# Patient Record
Sex: FEMALE | Race: WHITE | NOT HISPANIC OR LATINO | Employment: FULL TIME | URBAN - METROPOLITAN AREA
[De-identification: names, ages, dates, MRNs, and addresses within clinical notes are randomized per-mention and may not be internally consistent; named-entity substitution may affect disease eponyms.]

---

## 2018-10-04 ENCOUNTER — OFFICE VISIT (OUTPATIENT)
Dept: URGENT CARE | Facility: CLINIC | Age: 53
End: 2018-10-04
Payer: COMMERCIAL

## 2018-10-04 VITALS
HEART RATE: 71 BPM | TEMPERATURE: 98 F | DIASTOLIC BLOOD PRESSURE: 82 MMHG | HEIGHT: 69 IN | SYSTOLIC BLOOD PRESSURE: 129 MMHG | OXYGEN SATURATION: 99 % | BODY MASS INDEX: 29.77 KG/M2 | WEIGHT: 201 LBS | RESPIRATION RATE: 14 BRPM

## 2018-10-04 DIAGNOSIS — S93.402A SPRAIN OF LEFT ANKLE, UNSPECIFIED LIGAMENT, INITIAL ENCOUNTER: Primary | ICD-10-CM

## 2018-10-04 DIAGNOSIS — S82.432A CLOSED DISPLACED OBLIQUE FRACTURE OF SHAFT OF LEFT FIBULA, INITIAL ENCOUNTER: ICD-10-CM

## 2018-10-04 PROCEDURE — 99204 OFFICE O/P NEW MOD 45 MIN: CPT | Mod: 25,S$GLB,, | Performed by: NURSE PRACTITIONER

## 2018-10-04 PROCEDURE — 29515 APPLICATION SHORT LEG SPLINT: CPT | Mod: LT,S$GLB,, | Performed by: NURSE PRACTITIONER

## 2018-10-04 RX ORDER — ESTRADIOL 0.1 MG/G
CREAM VAGINAL DAILY
COMMUNITY
End: 2019-07-25

## 2018-10-04 RX ORDER — THYROID 30 MG/1
30 TABLET ORAL
COMMUNITY
End: 2018-11-06

## 2018-10-04 RX ORDER — HYDROCODONE BITARTRATE AND ACETAMINOPHEN 5; 325 MG/1; MG/1
1 TABLET ORAL EVERY 6 HOURS PRN
Qty: 20 TABLET | Refills: 0 | Status: SHIPPED | OUTPATIENT
Start: 2018-10-04 | End: 2018-12-04

## 2018-10-04 NOTE — PROCEDURES
Splint application  Date/Time: 10/4/2018 1:51 PM  Performed by: Agustin Jensen NP  Authorized by: Agustin Jensen NP   Location details: left ankle  Splint type: short leg  Supplies used: cotton padding and Ortho-Glass  Post-procedure: The splinted body part was neurovascularly unchanged following the procedure.  Patient tolerance: Patient tolerated the procedure well with no immediate complications

## 2018-10-04 NOTE — PROGRESS NOTES
"Subjective:       Patient ID: Mariah Ashton is a 53 y.o. female.    Vitals:  height is 5' 9" (1.753 m) and weight is 91.2 kg (201 lb). Her temperature is 97.6 °F (36.4 °C). Her blood pressure is 129/82 and her pulse is 71. Her respiration is 14 and oxygen saturation is 99%.     Chief Complaint: Fall  53 year old female presents complaining of fall. She complains of left ankle pain/swelling and abrasion to right knee. She fell and sustained a inversion injury to the left ankle when she missed step on stairs.  Review of Systems   Constitution: Negative for weakness and malaise/fatigue.   HENT: Negative for nosebleeds.    Cardiovascular: Negative for chest pain and syncope.   Respiratory: Negative for shortness of breath.    Skin:        abrasion   Musculoskeletal: Positive for joint swelling. Negative for back pain, joint pain and neck pain.   Gastrointestinal: Negative for abdominal pain.   Genitourinary: Negative for hematuria.   Neurological: Negative for dizziness and numbness.       Objective:      Physical Exam   Constitutional: She is oriented to person, place, and time. She appears well-developed and well-nourished. She is cooperative.  Non-toxic appearance. She does not appear ill. No distress.   HENT:   Head: Normocephalic and atraumatic. Head is without abrasion, without contusion and without laceration.   Right Ear: Hearing, tympanic membrane, external ear and ear canal normal. No hemotympanum.   Left Ear: Hearing, tympanic membrane, external ear and ear canal normal. No hemotympanum.   Nose: Nose normal. No mucosal edema, rhinorrhea or nasal deformity. No epistaxis. Right sinus exhibits no maxillary sinus tenderness and no frontal sinus tenderness. Left sinus exhibits no maxillary sinus tenderness and no frontal sinus tenderness.   Mouth/Throat: Uvula is midline, oropharynx is clear and moist and mucous membranes are normal. No trismus in the jaw. Normal dentition. No uvula swelling. No posterior " oropharyngeal erythema.   Eyes: Conjunctivae, EOM and lids are normal. Pupils are equal, round, and reactive to light. Right eye exhibits no discharge. Left eye exhibits no discharge. No scleral icterus.   Sclera clear bilat   Neck: Trachea normal, normal range of motion, full passive range of motion without pain and phonation normal. Neck supple. No spinous process tenderness and no muscular tenderness present. No neck rigidity. No tracheal deviation present.   Cardiovascular: Normal rate, regular rhythm, normal heart sounds, intact distal pulses and normal pulses.   Pulmonary/Chest: Effort normal and breath sounds normal. No respiratory distress.   Abdominal: Soft. Normal appearance and bowel sounds are normal. She exhibits no distension, no pulsatile midline mass and no mass. There is no tenderness.   Musculoskeletal: She exhibits no edema or deformity.        Right knee: She exhibits normal range of motion, no swelling, no deformity and no LCL laxity.        Left ankle: She exhibits decreased range of motion and swelling. She exhibits no laceration and normal pulse. Tenderness. Achilles tendon normal.        Legs:  Superficial abrasion   Neurological: She is alert and oriented to person, place, and time. She has normal strength. No cranial nerve deficit or sensory deficit. She exhibits normal muscle tone. She displays no seizure activity. Coordination normal. GCS eye subscore is 4. GCS verbal subscore is 5. GCS motor subscore is 6.   Skin: Skin is warm, dry and intact. Capillary refill takes less than 2 seconds. No abrasion, no bruising, no burn, no ecchymosis and no laceration noted. She is not diaphoretic. No pallor.   Psychiatric: She has a normal mood and affect. Her speech is normal and behavior is normal. Judgment and thought content normal. Cognition and memory are normal.   Nursing note and vitals reviewed.      Assessment:       1. Sprain of left ankle, unspecified ligament, initial encounter    2.  Closed displaced oblique fracture of shaft of left fibula, initial encounter        Plan:     Xray of left ankle suspicious for nondisplaced distal fibular fracture. Neurovascularly intact. Posterior short leg splint, crutches, norco prn. Orthopedic follow up.    Sprain of left ankle, unspecified ligament, initial encounter  -     X-Ray Ankle Complete 3 View Left; Future; Expected date: 10/04/2018  -     Splint application    Closed displaced oblique fracture of shaft of left fibula, initial encounter  -     Splint application

## 2018-10-04 NOTE — PATIENT INSTRUCTIONS
Understanding an Ankle Fracture    The ankle is formed by bones in the lower leg (tibia and fibula) and the bone on top of the foot (talus). When you have a fracture of the ankle, it means that one or more of the bones in the ankle are broken. The bone may be cracked, broken into two or more pieces, or even shattered. The pieces of bone may be lined up or they may have moved out of place. Sometimes, the bone may break through the skin. Nearby ligaments may also be damaged. Depending on how badly the bone is broken, healing may take a few months or longer.   What causes an ankle fracture?  Ankle fractures are often caused from severely twisting or rolling the ankle. They may also be caused from a fall, blow, accident, or sports injury.  Symptoms of an ankle fracture  Symptoms can include pain, swelling, and bruising. If the bone breaks through the skin, bleeding at the site can also occur. The ankle may look crooked, deformed, or bent. Also, it may be hard to move or use the ankle and foot as you would normally.  Treating an ankle fracture  Treatment for an ankle fracture depends on where the bone is broken and how serious the break is. If needed, the bone is put back into place. This may be done with or without surgery. If surgery is needed, the surgeon may use devices such as pins, plates, or screws to hold the bone together. Usually, you will wear a splint, brace, or short leg cast to keep the bone in place and protect it from injury during healing. Other treatments may also be used to help reduce symptoms or regain function. These include:  · Rest. You may need to avoid walking or putting any weight on the broken ankle for a period of time. Severe fractures need a longer limit on weight-bearing activities.  · Cold packs. Putting an ice pack over the injured area may help reduce swelling and pain.  · Compression. An elastic bandage may be wrapped around the ankle to help reduce swelling.  · Elevation. Propping  up the ankle so that it is above your heart may ease swelling.  · Pain medicines. Prescription or over-the-counter pain medicines may help reduce pain and swelling. If needed, stronger pain medicines may be prescribed.  · Exercises. Your healthcare provider may give you certain exercises to do at home or with a physical therapist. These help restore strength, flexibility, and range of motion in the ankle and foot.  Possible complications of an ankle fracture  These can include:  · Poor healing of the bone  · Weakness, stiffness, or loss of range of motion in the ankle  · Osteoarthritis in the ankle  When to call your healthcare provider  Call your healthcare provider right away if you have any of these:  · Fever of 100.4°F (38°C) or higher, or as directed  · Symptoms that dont get better with treatment, or get worse  · Numbness, tingling, or coldness in your foot or toes  · Toenails that turn blue or grey in color  · A splint, brace, or cast that is damaged or feels too tight or loose  · Unusual redness, warmth, swelling, bleeding, or drainage from any wounds or incision sites  · New symptoms   Date Last Reviewed: 3/10/2016  © 9179-0917 [email or web address]. [address] [place] [address], [place] [address]. All rights reserved. This information is not intended as a substitute for professional medical care. Always follow your healthcare professional's instructions.

## 2018-10-09 ENCOUNTER — OFFICE VISIT (OUTPATIENT)
Dept: ORTHOPEDICS | Facility: CLINIC | Age: 53
End: 2018-10-09
Payer: COMMERCIAL

## 2018-10-09 VITALS
BODY MASS INDEX: 28.14 KG/M2 | DIASTOLIC BLOOD PRESSURE: 80 MMHG | WEIGHT: 190 LBS | HEIGHT: 69 IN | SYSTOLIC BLOOD PRESSURE: 118 MMHG

## 2018-10-09 DIAGNOSIS — S82.65XA CLOSED NONDISPLACED FRACTURE OF LATERAL MALLEOLUS OF LEFT FIBULA, INITIAL ENCOUNTER: Primary | ICD-10-CM

## 2018-10-09 PROCEDURE — 73610 X-RAY EXAM OF ANKLE: CPT | Mod: LT,,, | Performed by: ORTHOPAEDIC SURGERY

## 2018-10-09 PROCEDURE — 99203 OFFICE O/P NEW LOW 30 MIN: CPT | Mod: 25,,, | Performed by: ORTHOPAEDIC SURGERY

## 2018-10-09 RX ORDER — ESTRADIOL 2 MG/1
2 TABLET ORAL NIGHTLY
Refills: 1 | COMMUNITY
Start: 2018-07-04 | End: 2019-11-04 | Stop reason: SDUPTHER

## 2018-10-09 RX ORDER — METOPROLOL SUCCINATE 25 MG/1
25 TABLET, EXTENDED RELEASE ORAL NIGHTLY
Refills: 3 | Status: ON HOLD | COMMUNITY
Start: 2018-09-07 | End: 2019-08-31 | Stop reason: SDUPTHER

## 2018-10-09 RX ORDER — THYROID, PORCINE 90 MG/1
TABLET ORAL
Refills: 5 | COMMUNITY
Start: 2018-09-04 | End: 2019-10-21

## 2018-10-09 NOTE — LETTER
October 9, 2018      Joshua Pruitt MD  [address]  [facility] [address]           [facility]  [address] [facility] [address]  [facility] [address]  Phone: [phone]  Fax: [phone]          Patient: Mariah Ashton   MR Number: [id number]   YOB: 1965   Date of Visit: 10/9/2018       Dear Dr. Joshua Pruitt:    Thank you for referring Mariah Ashton to me for evaluation. Attached you will find relevant portions of my assessment and plan of care.    If you have questions, please do not hesitate to call me. I look forward to following Mariah Ashton along with you.    Sincerely,    Porter Cfiuentes MD    Enclosure  CC:  No Recipients    If you would like to receive this communication electronically, please contact [email or web address] or [phone] to request more information on [facility] access.    For providers and/or their staff who would like to refer a patient to [facility], please contact us through our one-stop-shop provider referral line, [facility] [place], at [phone].    If you feel you have received this communication in error or would no longer like to receive these types of communications, please e-mail [email or web address]

## 2018-10-09 NOTE — PROGRESS NOTES
[facility]    Subjective:     Chief Complaint:   Chief Complaint   Patient presents with    Left Lower Leg - Injury     WC left  /fib fracture Thursday she was going down the steps at work and fell. She went to [facility] and that is where she had the x ray.       Past Medical History:   Diagnosis Date    Thyroid disease        Past Surgical History:   Procedure Laterality Date    ATRIAL CARDIAC PACEMAKER INSERTION      HYSTERECTOMY      left shoulder         Current Outpatient Medications   Medication Sig    ARMOUR THYROID 90 mg Tab TAKE 1 TABLET BY MOUTH ONCE ON MONDAY, WEDNESDAY, AND FRIDAY    estradiol (ESTRACE) 0.01 % (0.1 mg/gram) vaginal cream Place vaginally once daily.    estradiol (ESTRACE) 2 MG tablet Take 2 mg by mouth once daily.    metoprolol succinate (TOPROL-XL) 25 MG 24 hr tablet Take 25 mg by mouth once daily.    thyroid, pork, (ARMOUR THYROID) 30 mg Tab Take 30 mg by mouth before breakfast.    HYDROcodone-acetaminophen (NORCO) 5-325 mg per tablet Take 1 tablet by mouth every 6 (six) hours as needed for Pain.     No current facility-administered medications for this visit.        Review of patient's allergies indicates:  No Known Allergies    Family History   Problem Relation Age of Onset    No Known Problems Mother     No Known Problems Father        Social History     Socioeconomic History    Marital status: Single     Spouse name: Not on file    Number of children: Not on file    Years of education: Not on file    Highest education level: Not on file   Social Needs    Financial resource strain: Not on file    Food insecurity - worry: Not on file    Food insecurity - inability: Not on file    Transportation needs - medical: Not on file    Transportation needs - non-medical: Not on file   Occupational History    Not on file   Tobacco Use    Smoking status: Never Smoker    Smokeless tobacco: Never Used   Substance and Sexual Activity    Alcohol use: Not on file     Drug use: Not on file    Sexual activity: Not on file   Other Topics Concern    Not on file   Social History Narrative    Not on file       History of present illness: Patient comes in today for the left lower extremity. She fell at work. She was coming down stairs and tripped. At that time she sustained an injury to her ankle and referred on for further care.      Review of Systems:    Constitution: Negative for chills, fever, and sweats.  Negative for unexplained weight loss.    HENT:  Negative for headaches and blurry vision.    Cardiovascular:Negative for chest pain or irregular heart beat. Negative for hypertension.    Respiratory:  Negative for cough and shortness of breath.    Gastrointestinal: Negative for abdominal pain, heartburn, melena, nausea, and vomitting.    Genitourinary:  Negative bladder incontinence and dysuria.    Musculoskeletal:  See HPI for details.     Neurological: Negative for numbness.    Psychiatric/Behavioral: Negative for depression.  The patient is not nervous/anxious.      Endocrine: Negative for polyuria    Hematologic/Lymphatic: Negative for bleeding problem.  Does not bruise/bleed easily.    Skin: Negative for poor would healing and rash    Objective:      Physical Examination:    Vital Signs:    Vitals:    10/09/18 1308   BP: 118/80       Body mass index is 28.06 kg/m².    This a well-developed, well nourished patient in no acute distress.  They are alert and oriented and cooperative to examination.        Patient is tender over the lateral aspect of the ankle directly over the fibula. She is ecchymotic on both medial and lateral aspects of the ankle. She has no tenderness over the Lisfranc joint. No proximal fibula tenderness. No defect palpated the Achilles tendon.  Pertinent New Results:    XRAY Report / Interpretation:   AP lateral and oblique of the ankle done today demonstrates a nondisplaced lateral malleolar fracture    Assessment/Plan:      Lateral malleolar  fracture. Placed her into walker boot. She may be weightbearing as tolerated in the boot. No work for 2 weeks. She should stay home and elevate primarily. She must be allowed to elevate her leg. She will follow-up in 2 weeks. Anticipate MMI in 90 days. She will more likely than not return to her regular job in 4 weeks      This note was created using Dragon voice recognition software that occasionally misinterpreted phrases or words.

## 2018-10-23 ENCOUNTER — OFFICE VISIT (OUTPATIENT)
Dept: ORTHOPEDICS | Facility: CLINIC | Age: 53
End: 2018-10-23
Payer: COMMERCIAL

## 2018-10-23 VITALS
SYSTOLIC BLOOD PRESSURE: 130 MMHG | DIASTOLIC BLOOD PRESSURE: 80 MMHG | HEIGHT: 69 IN | BODY MASS INDEX: 28.88 KG/M2 | WEIGHT: 195 LBS

## 2018-10-23 DIAGNOSIS — S82.65XD CLOSED NONDISPLACED FRACTURE OF LATERAL MALLEOLUS OF LEFT FIBULA WITH ROUTINE HEALING, SUBSEQUENT ENCOUNTER: Primary | ICD-10-CM

## 2018-10-23 PROCEDURE — 73610 X-RAY EXAM OF ANKLE: CPT | Mod: LT,,, | Performed by: ORTHOPAEDIC SURGERY

## 2018-10-23 PROCEDURE — 99213 OFFICE O/P EST LOW 20 MIN: CPT | Mod: 25,,, | Performed by: ORTHOPAEDIC SURGERY

## 2018-10-23 NOTE — PROGRESS NOTES
[facility]    Subjective:     Chief Complaint:   Chief Complaint   Patient presents with    Left Ankle - Injury     WC left ankle fx. She has minor pain        Past Medical History:   Diagnosis Date    Thyroid disease        Past Surgical History:   Procedure Laterality Date    ATRIAL CARDIAC PACEMAKER INSERTION      HYSTERECTOMY      left shoulder         Current Outpatient Medications   Medication Sig    ARMOUR THYROID 90 mg Tab TAKE 1 TABLET BY MOUTH ONCE ON MONDAY, WEDNESDAY, AND FRIDAY    estradiol (ESTRACE) 0.01 % (0.1 mg/gram) vaginal cream Place vaginally once daily.    estradiol (ESTRACE) 2 MG tablet Take 2 mg by mouth once daily.    HYDROcodone-acetaminophen (NORCO) 5-325 mg per tablet Take 1 tablet by mouth every 6 (six) hours as needed for Pain.    metoprolol succinate (TOPROL-XL) 25 MG 24 hr tablet Take 25 mg by mouth once daily.    thyroid, pork, (ARMOUR THYROID) 30 mg Tab Take 30 mg by mouth before breakfast.     No current facility-administered medications for this visit.        Review of patient's allergies indicates:  No Known Allergies    Family History   Problem Relation Age of Onset    No Known Problems Mother     No Known Problems Father        Social History     Socioeconomic History    Marital status:      Spouse name: Not on file    Number of children: Not on file    Years of education: Not on file    Highest education level: Not on file   Social Needs    Financial resource strain: Not on file    Food insecurity - worry: Not on file    Food insecurity - inability: Not on file    Transportation needs - medical: Not on file    Transportation needs - non-medical: Not on file   Occupational History    Not on file   Tobacco Use    Smoking status: Never Smoker    Smokeless tobacco: Never Used   Substance and Sexual Activity    Alcohol use: Not on file    Drug use: Not on file    Sexual activity: Not on file   Other Topics Concern    Not on file    Social History Narrative    Not on file       History of present illness: Patient returns for her left ankle. She is here for follow-up for lateral malleolar fracture.      Review of Systems:    Constitution: Negative for chills, fever, and sweats.  Negative for unexplained weight loss.    HENT:  Negative for headaches and blurry vision.    Cardiovascular:Negative for chest pain or irregular heart beat. Negative for hypertension.    Respiratory:  Negative for cough and shortness of breath.    Gastrointestinal: Negative for abdominal pain, heartburn, melena, nausea, and vomitting.    Genitourinary:  Negative bladder incontinence and dysuria.    Musculoskeletal:  See HPI for details.     Neurological: Negative for numbness.    Psychiatric/Behavioral: Negative for depression.  The patient is not nervous/anxious.      Endocrine: Negative for polyuria    Hematologic/Lymphatic: Negative for bleeding problem.  Does not bruise/bleed easily.    Skin: Negative for poor would healing and rash    Objective:      Physical Examination:    Vital Signs:    Vitals:    10/23/18 1335   BP: 130/80       Body mass index is 28.8 kg/m².    This a well-developed, well nourished patient in no acute distress.  They are alert and oriented and cooperative to examination.        Patient is tender over the lateral malleolus. No tenderness medially.  Pertinent New Results:    XRAY Report / Interpretation:   AP lateral oblique of the left ankle demonstrate lateral malleolar fracture. There is been no change in the fracture fragments from prior visit. The mortise is intact.    Assessment/Plan:      3 weeks out from a month minimally displaced lateral malleolar fracture. Generally doing very well. She will continue her cast boot. She will follow-up in 2 weeks for new x-rays. In the interim no work.    This note was created using Dragon voice recognition software that occasionally misinterpreted phrases or words.

## 2018-11-06 ENCOUNTER — OFFICE VISIT (OUTPATIENT)
Dept: ORTHOPEDICS | Facility: CLINIC | Age: 53
End: 2018-11-06
Payer: COMMERCIAL

## 2018-11-06 VITALS
BODY MASS INDEX: 28.88 KG/M2 | WEIGHT: 195 LBS | DIASTOLIC BLOOD PRESSURE: 96 MMHG | HEART RATE: 116 BPM | SYSTOLIC BLOOD PRESSURE: 139 MMHG | HEIGHT: 69 IN

## 2018-11-06 DIAGNOSIS — S82.65XD CLOSED NONDISPLACED FRACTURE OF LATERAL MALLEOLUS OF LEFT FIBULA WITH ROUTINE HEALING, SUBSEQUENT ENCOUNTER: Primary | ICD-10-CM

## 2018-11-06 PROCEDURE — 99024 POSTOP FOLLOW-UP VISIT: CPT | Mod: ,,, | Performed by: ORTHOPAEDIC SURGERY

## 2018-11-06 PROCEDURE — 73610 X-RAY EXAM OF ANKLE: CPT | Mod: LT,,, | Performed by: ORTHOPAEDIC SURGERY

## 2018-11-06 NOTE — PROGRESS NOTES
[facility]    Subjective:     Chief Complaint:   Chief Complaint   Patient presents with    Left Ankle - Pain     W/C DOI 10/4/18. Left ankle fx. States that she is feeling better.          Past Medical History:   Diagnosis Date    Thyroid disease        Past Surgical History:   Procedure Laterality Date    ATRIAL CARDIAC PACEMAKER INSERTION      HYSTERECTOMY      left shoulder         Current Outpatient Medications   Medication Sig    ARMOUR THYROID 90 mg Tab TAKE 1 TABLET BY MOUTH ONCE ON MONDAY, WEDNESDAY, AND FRIDAY    estradiol (ESTRACE) 0.01 % (0.1 mg/gram) vaginal cream Place vaginally once daily.    estradiol (ESTRACE) 2 MG tablet Take 2 mg by mouth once daily.    HYDROcodone-acetaminophen (NORCO) 5-325 mg per tablet Take 1 tablet by mouth every 6 (six) hours as needed for Pain.    metoprolol succinate (TOPROL-XL) 25 MG 24 hr tablet Take 25 mg by mouth once daily.     No current facility-administered medications for this visit.        Review of patient's allergies indicates:  No Known Allergies    Family History   Problem Relation Age of Onset    No Known Problems Mother     No Known Problems Father        Social History     Socioeconomic History    Marital status:      Spouse name: Not on file    Number of children: Not on file    Years of education: Not on file    Highest education level: Not on file   Social Needs    Financial resource strain: Not on file    Food insecurity - worry: Not on file    Food insecurity - inability: Not on file    Transportation needs - medical: Not on file    Transportation needs - non-medical: Not on file   Occupational History    Not on file   Tobacco Use    Smoking status: Never Smoker    Smokeless tobacco: Never Used   Substance and Sexual Activity    Alcohol use: Not on file    Drug use: Not on file    Sexual activity: Not on file   Other Topics Concern    Not on file   Social History Narrative    Not on file       History of  present illness: Patient returns for the left ankle. She is doing much better her pain is significantly improved      Review of Systems:    Constitution: Negative for chills, fever, and sweats.  Negative for unexplained weight loss.    HENT:  Negative for headaches and blurry vision.    Cardiovascular:Negative for chest pain or irregular heart beat. Negative for hypertension.    Respiratory:  Negative for cough and shortness of breath.    Gastrointestinal: Negative for abdominal pain, heartburn, melena, nausea, and vomitting.    Genitourinary:  Negative bladder incontinence and dysuria.    Musculoskeletal:  See HPI for details.     Neurological: Negative for numbness.    Psychiatric/Behavioral: Negative for depression.  The patient is not nervous/anxious.      Endocrine: Negative for polyuria    Hematologic/Lymphatic: Negative for bleeding problem.  Does not bruise/bleed easily.    Skin: Negative for poor would healing and rash    Objective:      Physical Examination:    Vital Signs:    Vitals:    11/06/18 1137   BP: (!) 139/96   Pulse: (!) 116       Body mass index is 28.8 kg/m².    This a well-developed, well nourished patient in no acute distress.  They are alert and oriented and cooperative to examination.        Patient is minimal discomfort over the lateral malleolus.  Pertinent New Results:    XRAY Report / Interpretation:   AP lateral and oblique of the left ankle done in the office today demonstrates a lateral malleolar fracture in acceptable position. This been no change from interval x-rays.    Assessment/Plan:      Lateral malleolar fracture. Placed her into an ankle corset. She may return to work primarily sedentary duty. Started on physical therapy. Follow-up in 6 weeks. Anticipate she'll be MMI in 6 weeks      This note was created using Dragon voice recognition software that occasionally misinterpreted phrases or words.

## 2018-12-04 ENCOUNTER — OFFICE VISIT (OUTPATIENT)
Dept: ORTHOPEDICS | Facility: CLINIC | Age: 53
End: 2018-12-04
Payer: COMMERCIAL

## 2018-12-04 VITALS
HEART RATE: 63 BPM | BODY MASS INDEX: 28.88 KG/M2 | DIASTOLIC BLOOD PRESSURE: 80 MMHG | HEIGHT: 69 IN | WEIGHT: 195 LBS | SYSTOLIC BLOOD PRESSURE: 128 MMHG

## 2018-12-04 DIAGNOSIS — S82.65XD CLOSED NONDISPLACED FRACTURE OF LATERAL MALLEOLUS OF LEFT FIBULA WITH ROUTINE HEALING, SUBSEQUENT ENCOUNTER: Primary | ICD-10-CM

## 2018-12-04 PROCEDURE — 99213 OFFICE O/P EST LOW 20 MIN: CPT | Mod: 25,,, | Performed by: ORTHOPAEDIC SURGERY

## 2018-12-04 PROCEDURE — 73610 X-RAY EXAM OF ANKLE: CPT | Mod: LT,,, | Performed by: ORTHOPAEDIC SURGERY

## 2018-12-04 NOTE — PROGRESS NOTES
[facility]    Subjective:     Chief Complaint:   Chief Complaint   Patient presents with    Left Ankle - Injury     WC 4 lt ankle fx follow up DOI 10/4/18. She still has some soreness. She took corsett off and she has not had P.T       Past Medical History:   Diagnosis Date    Thyroid disease        Past Surgical History:   Procedure Laterality Date    ATRIAL CARDIAC PACEMAKER INSERTION      HYSTERECTOMY      left shoulder         Current Outpatient Medications   Medication Sig    ARMOUR THYROID 90 mg Tab TAKE 1 TABLET BY MOUTH ONCE ON MONDAY, WEDNESDAY, AND FRIDAY    estradiol (ESTRACE) 0.01 % (0.1 mg/gram) vaginal cream Place vaginally once daily.    estradiol (ESTRACE) 2 MG tablet Take 2 mg by mouth once daily.    metoprolol succinate (TOPROL-XL) 25 MG 24 hr tablet Take 25 mg by mouth once daily.     No current facility-administered medications for this visit.        Review of patient's allergies indicates:  No Known Allergies    Family History   Problem Relation Age of Onset    No Known Problems Mother     No Known Problems Father        Social History     Socioeconomic History    Marital status:      Spouse name: Not on file    Number of children: Not on file    Years of education: Not on file    Highest education level: Not on file   Social Needs    Financial resource strain: Not on file    Food insecurity - worry: Not on file    Food insecurity - inability: Not on file    Transportation needs - medical: Not on file    Transportation needs - non-medical: Not on file   Occupational History    Not on file   Tobacco Use    Smoking status: Never Smoker    Smokeless tobacco: Never Used   Substance and Sexual Activity    Alcohol use: Not on file    Drug use: Not on file    Sexual activity: Not on file   Other Topics Concern    Not on file   Social History Narrative    Not on file       History of present illness: Patient returns status post lateral malleolar fracture of  her left ankle. She continues to improve. Still complains of pain and swelling. She is 8 weeks out.      Review of Systems:    Constitution: Negative for chills, fever, and sweats.  Negative for unexplained weight loss.    HENT:  Negative for headaches and blurry vision.    Cardiovascular:Negative for chest pain or irregular heart beat. Negative for hypertension.    Respiratory:  Negative for cough and shortness of breath.    Gastrointestinal: Negative for abdominal pain, heartburn, melena, nausea, and vomitting.    Genitourinary:  Negative bladder incontinence and dysuria.    Musculoskeletal:  See HPI for details.     Neurological: Negative for numbness.    Psychiatric/Behavioral: Negative for depression.  The patient is not nervous/anxious.      Endocrine: Negative for polyuria    Hematologic/Lymphatic: Negative for bleeding problem.  Does not bruise/bleed easily.    Skin: Negative for poor would healing and rash    Objective:      Physical Examination:    Vital Signs:    Vitals:    12/04/18 1531   BP: 128/80   Pulse: 63       Body mass index is 28.8 kg/m².    This a well-developed, well nourished patient in no acute distress.  They are alert and oriented and cooperative to examination.        Patient is tender over the lateral talus. She has 20° of dorsiflexion. She is 20° of plantar flexion. She is neurovascular intact the foot.  Pertinent New Results:    XRAY Report / Interpretation:   AP and lateral of the left ankle in the office today demonstrates an intact mortise. The fracture is in good position. She is developing callus. There is been no change in fracture fragment since a prior x-ray    Assessment/Plan:      Stable following closed treatment of lateral malleolar fracture. Continue primarily sedentary work with intermittent standing. Start physical therapy. Follow-up in 6 weeks. At that time I hope to release her back to full duty      This note was created using Dragon voice recognition software that  occasionally misinterpreted phrases or words.

## 2019-01-15 ENCOUNTER — OFFICE VISIT (OUTPATIENT)
Dept: ORTHOPEDICS | Facility: CLINIC | Age: 54
End: 2019-01-15
Payer: COMMERCIAL

## 2019-01-15 VITALS
HEART RATE: 66 BPM | BODY MASS INDEX: 28.88 KG/M2 | WEIGHT: 195 LBS | DIASTOLIC BLOOD PRESSURE: 78 MMHG | HEIGHT: 69 IN | SYSTOLIC BLOOD PRESSURE: 128 MMHG

## 2019-01-15 DIAGNOSIS — S82.65XD CLOSED NONDISPLACED FRACTURE OF LATERAL MALLEOLUS OF LEFT FIBULA WITH ROUTINE HEALING, SUBSEQUENT ENCOUNTER: Primary | ICD-10-CM

## 2019-01-15 PROCEDURE — 99213 PR OFFICE/OUTPT VISIT, EST, LEVL III, 20-29 MIN: ICD-10-PCS | Mod: 25,,, | Performed by: ORTHOPAEDIC SURGERY

## 2019-01-15 PROCEDURE — 73610 X-RAY EXAM OF ANKLE: CPT | Mod: LT,,, | Performed by: ORTHOPAEDIC SURGERY

## 2019-01-15 PROCEDURE — 73610 PR  X-RAY ANKLE 3+ VW: ICD-10-PCS | Mod: LT,,, | Performed by: ORTHOPAEDIC SURGERY

## 2019-01-15 PROCEDURE — 99213 OFFICE O/P EST LOW 20 MIN: CPT | Mod: 25,,, | Performed by: ORTHOPAEDIC SURGERY

## 2019-01-15 RX ORDER — MELOXICAM 15 MG/1
15 TABLET ORAL DAILY
Qty: 30 TABLET | Refills: 2 | Status: SHIPPED | OUTPATIENT
Start: 2019-01-15 | End: 2019-08-29

## 2019-01-15 NOTE — PROGRESS NOTES
[facility]    Subjective:     Chief Complaint:   Chief Complaint   Patient presents with    Left Ankle - Injury     WC 6 wkLT ankle fx follow up. DOI 10-4-18. It is getting a little better and she is in P.T now         Past Medical History:   Diagnosis Date    Thyroid disease        Past Surgical History:   Procedure Laterality Date    ATRIAL CARDIAC PACEMAKER INSERTION      HYSTERECTOMY      left shoulder         Current Outpatient Medications   Medication Sig    ARMOUR THYROID 90 mg Tab TAKE 1 TABLET BY MOUTH ONCE ON MONDAY, WEDNESDAY, AND FRIDAY    estradiol (ESTRACE) 0.01 % (0.1 mg/gram) vaginal cream Place vaginally once daily.    estradiol (ESTRACE) 2 MG tablet Take 2 mg by mouth once daily.    metoprolol succinate (TOPROL-XL) 25 MG 24 hr tablet Take 25 mg by mouth once daily.     No current facility-administered medications for this visit.        Review of patient's allergies indicates:  No Known Allergies    Family History   Problem Relation Age of Onset    No Known Problems Mother     No Known Problems Father        Social History     Socioeconomic History    Marital status:      Spouse name: Not on file    Number of children: Not on file    Years of education: Not on file    Highest education level: Not on file   Social Needs    Financial resource strain: Not on file    Food insecurity - worry: Not on file    Food insecurity - inability: Not on file    Transportation needs - medical: Not on file    Transportation needs - non-medical: Not on file   Occupational History    Not on file   Tobacco Use    Smoking status: Never Smoker    Smokeless tobacco: Never Used   Substance and Sexual Activity    Alcohol use: Not on file    Drug use: Not on file    Sexual activity: Not on file   Other Topics Concern    Not on file   Social History Narrative    Not on file       History of present illness: Patient comes in today for her left ankle. She really is just started  physical therapy. She feels like it's helping already. She's having pain over the lateral aspect of the ankle.      Review of Systems:    Constitution: Negative for chills, fever, and sweats.  Negative for unexplained weight loss.    HENT:  Negative for headaches and blurry vision.    Cardiovascular:Negative for chest pain or irregular heart beat. Negative for hypertension.    Respiratory:  Negative for cough and shortness of breath.    Gastrointestinal: Negative for abdominal pain, heartburn, melena, nausea, and vomitting.    Genitourinary:  Negative bladder incontinence and dysuria.    Musculoskeletal:  See HPI for details.     Neurological: Negative for numbness.    Psychiatric/Behavioral: Negative for depression.  The patient is not nervous/anxious.      Endocrine: Negative for polyuria    Hematologic/Lymphatic: Negative for bleeding problem.  Does not bruise/bleed easily.    Skin: Negative for poor would healing and rash    Objective:      Physical Examination:    Vital Signs:    Vitals:    01/15/19 1627   BP: 128/78   Pulse: 66       Body mass index is 28.8 kg/m².    This a well-developed, well nourished patient in no acute distress.  They are alert and oriented and cooperative to examination.        Patient is tender over the peroneal tendons. She has no instability. She does have swelling around the ankle.  Pertinent New Results:    XRAY Report / Interpretation:   3 views of the ankle done in the office demonstrate a healed lateral malleolar fracture. The mortise is intact    Assessment/Plan:      Stable following conservative care for a lateral malleolar fracture. Continue physical therapy. Follow-up in 4 weeks. She may work but sedentary work      This note was created using Dragon voice recognition software that occasionally misinterpreted phrases or words.

## 2019-02-12 ENCOUNTER — OFFICE VISIT (OUTPATIENT)
Dept: ORTHOPEDICS | Facility: CLINIC | Age: 54
End: 2019-02-12
Payer: COMMERCIAL

## 2019-02-12 VITALS
SYSTOLIC BLOOD PRESSURE: 138 MMHG | DIASTOLIC BLOOD PRESSURE: 84 MMHG | HEIGHT: 69 IN | HEART RATE: 82 BPM | WEIGHT: 195 LBS | BODY MASS INDEX: 28.88 KG/M2

## 2019-02-12 DIAGNOSIS — S82.65XD CLOSED NONDISPLACED FRACTURE OF LATERAL MALLEOLUS OF LEFT FIBULA WITH ROUTINE HEALING, SUBSEQUENT ENCOUNTER: Primary | ICD-10-CM

## 2019-02-12 PROCEDURE — 99213 PR OFFICE/OUTPT VISIT, EST, LEVL III, 20-29 MIN: ICD-10-PCS | Mod: ,,, | Performed by: ORTHOPAEDIC SURGERY

## 2019-02-12 PROCEDURE — 99213 OFFICE O/P EST LOW 20 MIN: CPT | Mod: ,,, | Performed by: ORTHOPAEDIC SURGERY

## 2019-02-12 RX ORDER — MECLIZINE HYDROCHLORIDE 25 MG/1
25 TABLET ORAL 3 TIMES DAILY PRN
Refills: 0 | COMMUNITY
Start: 2019-02-05 | End: 2019-08-30

## 2019-02-12 NOTE — PROGRESS NOTES
[facility]    Subjective:     Chief Complaint:   Chief Complaint   Patient presents with    Left Ankle - Injury     W/C DOI 10.4.18 4 WK LT ANKLE PAIN FOLLOW UP (1/15/19) No pain. NO problems.       Past Medical History:   Diagnosis Date    Bradycardia     Thyroid disease        Past Surgical History:   Procedure Laterality Date    ATRIAL CARDIAC PACEMAKER INSERTION      HYSTERECTOMY      left shoulder         Current Outpatient Medications   Medication Sig    ARMOUR THYROID 90 mg Tab TAKE 1 TABLET BY MOUTH ONCE ON MONDAY, WEDNESDAY, AND FRIDAY    estradiol (ESTRACE) 0.01 % (0.1 mg/gram) vaginal cream Place vaginally once daily.    estradiol (ESTRACE) 2 MG tablet Take 2 mg by mouth once daily.    meclizine (ANTIVERT) 25 mg tablet Take 25 mg by mouth 3 (three) times daily as needed.    meloxicam (MOBIC) 15 MG tablet Take 1 tablet (15 mg total) by mouth once daily.    metoprolol succinate (TOPROL-XL) 25 MG 24 hr tablet Take 25 mg by mouth once daily.     No current facility-administered medications for this visit.        Review of patient's allergies indicates:  No Known Allergies    Family History   Problem Relation Age of Onset    No Known Problems Mother     No Known Problems Father        Social History     Socioeconomic History    Marital status:      Spouse name: Not on file    Number of children: Not on file    Years of education: Not on file    Highest education level: Not on file   Social Needs    Financial resource strain: Not on file    Food insecurity - worry: Not on file    Food insecurity - inability: Not on file    Transportation needs - medical: Not on file    Transportation needs - non-medical: Not on file   Occupational History    Not on file   Tobacco Use    Smoking status: Never Smoker    Smokeless tobacco: Never Used   Substance and Sexual Activity    Alcohol use: Not on file    Drug use: Not on file    Sexual activity: Not on file   Other Topics  Concern    Not on file   Social History Narrative    Not on file       History of present illness: Patient returns for the left ankle. She is doing much better. Still having will swelling but much better with the physical therapy      Review of Systems:    Constitution: Negative for chills, fever, and sweats.  Negative for unexplained weight loss.    HENT:  Negative for headaches and blurry vision.    Cardiovascular:Negative for chest pain or irregular heart beat. Negative for hypertension.    Respiratory:  Negative for cough and shortness of breath.    Gastrointestinal: Negative for abdominal pain, heartburn, melena, nausea, and vomitting.    Genitourinary:  Negative bladder incontinence and dysuria.    Musculoskeletal:  See HPI for details.     Neurological: Negative for numbness.    Psychiatric/Behavioral: Negative for depression.  The patient is not nervous/anxious.      Endocrine: Negative for polyuria    Hematologic/Lymphatic: Negative for bleeding problem.  Does not bruise/bleed easily.    Skin: Negative for poor would healing and rash    Objective:      Physical Examination:    Vital Signs:    Vitals:    02/12/19 1535   BP: 138/84   Pulse: 82       Body mass index is 28.8 kg/m².    This a well-developed, well nourished patient in no acute distress.  They are alert and oriented and cooperative to examination.        Patient has full range of motion the left ankle. She is certainly more swelling on the left versus the right. No real tenderness over the fibula     Pertinent New Results:    XRAY Report / Interpretation:   AP lateral oblique of the left ankle demonstrates him unionizing fibula fracture. No subluxations. Her mortise is intact    Assessment/Plan:      Fibula fracture. She may return to work without restriction. She is MMI. She will follow-up when necessary      This note was created using Dragon voice recognition software that occasionally misinterpreted phrases or words.

## 2019-07-25 ENCOUNTER — OFFICE VISIT (OUTPATIENT)
Dept: ORTHOPEDICS | Facility: CLINIC | Age: 54
End: 2019-07-25
Payer: COMMERCIAL

## 2019-07-25 VITALS
SYSTOLIC BLOOD PRESSURE: 136 MMHG | BODY MASS INDEX: 29.62 KG/M2 | HEART RATE: 80 BPM | WEIGHT: 200 LBS | HEIGHT: 69 IN | DIASTOLIC BLOOD PRESSURE: 86 MMHG

## 2019-07-25 DIAGNOSIS — S83.282A OTHER TEAR OF LATERAL MENISCUS OF LEFT KNEE AS CURRENT INJURY, INITIAL ENCOUNTER: Primary | ICD-10-CM

## 2019-07-25 DIAGNOSIS — S83.242A OTHER TEAR OF MEDIAL MENISCUS OF LEFT KNEE AS CURRENT INJURY, INITIAL ENCOUNTER: ICD-10-CM

## 2019-07-25 DIAGNOSIS — M25.562 ACUTE PAIN OF LEFT KNEE: ICD-10-CM

## 2019-07-25 PROCEDURE — 73562 PR  X-RAY KNEE 3 VIEW: ICD-10-PCS | Mod: LT,,, | Performed by: ORTHOPAEDIC SURGERY

## 2019-07-25 PROCEDURE — 99213 PR OFFICE/OUTPT VISIT, EST, LEVL III, 20-29 MIN: ICD-10-PCS | Mod: 25,,, | Performed by: ORTHOPAEDIC SURGERY

## 2019-07-25 PROCEDURE — 73562 X-RAY EXAM OF KNEE 3: CPT | Mod: LT,,, | Performed by: ORTHOPAEDIC SURGERY

## 2019-07-25 PROCEDURE — 99213 OFFICE O/P EST LOW 20 MIN: CPT | Mod: 25,,, | Performed by: ORTHOPAEDIC SURGERY

## 2019-07-25 NOTE — PROGRESS NOTES
[facility]    Subjective:     Chief Complaint:   Chief Complaint   Patient presents with    Left Knee - Pain     Left knee pain x October. States that her pain has increased. States that her knee pain is constant and gets worse with activity.          Past Medical History:   Diagnosis Date    Bradycardia     Thyroid disease        Past Surgical History:   Procedure Laterality Date    ATRIAL CARDIAC PACEMAKER INSERTION      HYSTERECTOMY      left shoulder         Current Outpatient Medications   Medication Sig    ARMOUR THYROID 90 mg Tab TAKE 1 TABLET BY MOUTH ONCE ON MONDAY, WEDNESDAY, AND FRIDAY    estradiol (ESTRACE) 2 MG tablet Take 2 mg by mouth once daily.    meclizine (ANTIVERT) 25 mg tablet Take 25 mg by mouth 3 (three) times daily as needed.    metoprolol succinate (TOPROL-XL) 25 MG 24 hr tablet Take 25 mg by mouth once daily.    meloxicam (MOBIC) 15 MG tablet Take 1 tablet (15 mg total) by mouth once daily.     No current facility-administered medications for this visit.        Review of patient's allergies indicates:  No Known Allergies    Family History   Problem Relation Age of Onset    No Known Problems Mother     No Known Problems Father        Social History     Socioeconomic History    Marital status:      Spouse name: Not on file    Number of children: Not on file    Years of education: Not on file    Highest education level: Not on file   Occupational History    Not on file   Social Needs    Financial resource strain: Not on file    Food insecurity:     Worry: Not on file     Inability: Not on file    Transportation needs:     Medical: Not on file     Non-medical: Not on file   Tobacco Use    Smoking status: Never Smoker    Smokeless tobacco: Never Used   Substance and Sexual Activity    Alcohol use: Not on file    Drug use: Not on file    Sexual activity: Not on file   Lifestyle    Physical activity:     Days per week: Not on file     Minutes per  session: Not on file    Stress: Not on file   Relationships    Social connections:     Talks on phone: Not on file     Gets together: Not on file     Attends Jain service: Not on file     Active member of club or organization: Not on file     Attends meetings of clubs or organizations: Not on file     Relationship status: Not on file   Other Topics Concern    Not on file   Social History Narrative    Not on file       History of present illness: Patient comes in today for the left knee. She's having a lot of left knee pain. A lot of grinding. The knee jefferson and gives way. She had an MRI done several years ago which time he was determined that she had medial and lateral meniscal tears. She was going to undergo an arthroscopy but never did for social reasons. Now her knee is buckling and she really wants to have her knee scoped      Review of Systems:    Constitution: Negative for chills, fever, and sweats.  Negative for unexplained weight loss.    HENT:  Negative for headaches and blurry vision.    Cardiovascular:Negative for chest pain or irregular heart beat. Negative for hypertension.    Respiratory:  Negative for cough and shortness of breath.    Gastrointestinal: Negative for abdominal pain, heartburn, melena, nausea, and vomitting.    Genitourinary:  Negative bladder incontinence and dysuria.    Musculoskeletal:  See HPI for details.     Neurological: Negative for numbness.    Psychiatric/Behavioral: Negative for depression.  The patient is not nervous/anxious.      Endocrine: Negative for polyuria    Hematologic/Lymphatic: Negative for bleeding problem.  Does not bruise/bleed easily.    Skin: Negative for poor would healing and rash    Objective:      Physical Examination:    Vital Signs:    Vitals:    07/25/19 1453   BP: 136/86   Pulse: 80       Body mass index is 29.53 kg/m².    This a well-developed, well nourished patient in no acute distress.  They are alert and oriented and cooperative to  examination.        Patient appears her stated age. She is a positive Foster's for pain but not a pop she is a lot of medial joint line tenderness. She is some lateral joint line tenderness. She has mild anterior crepitus. She has 1+ effusion. Symmetric range of motion of the right knee 0-120 degrees. Mild crepitus on the right side.  Pertinent New Results:  An MRI of the left is reviewed. It demonstrates medial and lateral meniscal tears. Patella femoral arthrosis.  XRAY Report / Interpretation:   AP lateral and sunrise views of the left knee demonstrate moderate patellofemoral arthroereisis the left knee   Assessment/Plan:      Arthritis and medial and lateral meniscal tears. I've offered her arthroscopy partial medial and lateral meniscectomy. Risks and benefits of the procedure discussed with her in great detail. She understood and wished to proceed    This note was created using Dragon voice recognition software that occasionally misinterpreted phrases or words.

## 2019-07-25 NOTE — H&P (VIEW-ONLY)
[facility]    Subjective:     Chief Complaint:   Chief Complaint   Patient presents with    Left Knee - Pain     Left knee pain x October. States that her pain has increased. States that her knee pain is constant and gets worse with activity.          Past Medical History:   Diagnosis Date    Bradycardia     Thyroid disease        Past Surgical History:   Procedure Laterality Date    ATRIAL CARDIAC PACEMAKER INSERTION      HYSTERECTOMY      left shoulder         Current Outpatient Medications   Medication Sig    ARMOUR THYROID 90 mg Tab TAKE 1 TABLET BY MOUTH ONCE ON MONDAY, WEDNESDAY, AND FRIDAY    estradiol (ESTRACE) 2 MG tablet Take 2 mg by mouth once daily.    meclizine (ANTIVERT) 25 mg tablet Take 25 mg by mouth 3 (three) times daily as needed.    metoprolol succinate (TOPROL-XL) 25 MG 24 hr tablet Take 25 mg by mouth once daily.    meloxicam (MOBIC) 15 MG tablet Take 1 tablet (15 mg total) by mouth once daily.     No current facility-administered medications for this visit.        Review of patient's allergies indicates:  No Known Allergies    Family History   Problem Relation Age of Onset    No Known Problems Mother     No Known Problems Father        Social History     Socioeconomic History    Marital status:      Spouse name: Not on file    Number of children: Not on file    Years of education: Not on file    Highest education level: Not on file   Occupational History    Not on file   Social Needs    Financial resource strain: Not on file    Food insecurity:     Worry: Not on file     Inability: Not on file    Transportation needs:     Medical: Not on file     Non-medical: Not on file   Tobacco Use    Smoking status: Never Smoker    Smokeless tobacco: Never Used   Substance and Sexual Activity    Alcohol use: Not on file    Drug use: Not on file    Sexual activity: Not on file   Lifestyle    Physical activity:     Days per week: Not on file     Minutes per  session: Not on file    Stress: Not on file   Relationships    Social connections:     Talks on phone: Not on file     Gets together: Not on file     Attends Orthodox service: Not on file     Active member of club or organization: Not on file     Attends meetings of clubs or organizations: Not on file     Relationship status: Not on file   Other Topics Concern    Not on file   Social History Narrative    Not on file       History of present illness: Patient comes in today for the left knee. She's having a lot of left knee pain. A lot of grinding. The knee jefferson and gives way. She had an MRI done several years ago which time he was determined that she had medial and lateral meniscal tears. She was going to undergo an arthroscopy but never did for social reasons. Now her knee is buckling and she really wants to have her knee scoped      Review of Systems:    Constitution: Negative for chills, fever, and sweats.  Negative for unexplained weight loss.    HENT:  Negative for headaches and blurry vision.    Cardiovascular:Negative for chest pain or irregular heart beat. Negative for hypertension.    Respiratory:  Negative for cough and shortness of breath.    Gastrointestinal: Negative for abdominal pain, heartburn, melena, nausea, and vomitting.    Genitourinary:  Negative bladder incontinence and dysuria.    Musculoskeletal:  See HPI for details.     Neurological: Negative for numbness.    Psychiatric/Behavioral: Negative for depression.  The patient is not nervous/anxious.      Endocrine: Negative for polyuria    Hematologic/Lymphatic: Negative for bleeding problem.  Does not bruise/bleed easily.    Skin: Negative for poor would healing and rash    Objective:      Physical Examination:    Vital Signs:    Vitals:    07/25/19 1453   BP: 136/86   Pulse: 80       Body mass index is 29.53 kg/m².    This a well-developed, well nourished patient in no acute distress.  They are alert and oriented and cooperative to  examination.        Patient appears her stated age. She is a positive Foster's for pain but not a pop she is a lot of medial joint line tenderness. She is some lateral joint line tenderness. She has mild anterior crepitus. She has 1+ effusion. Symmetric range of motion of the right knee 0-120 degrees. Mild crepitus on the right side.  Pertinent New Results:  An MRI of the left is reviewed. It demonstrates medial and lateral meniscal tears. Patella femoral arthrosis.  XRAY Report / Interpretation:   AP lateral and sunrise views of the left knee demonstrate moderate patellofemoral arthroereisis the left knee   Assessment/Plan:      Arthritis and medial and lateral meniscal tears. I've offered her arthroscopy partial medial and lateral meniscectomy. Risks and benefits of the procedure discussed with her in great detail. She understood and wished to proceed    This note was created using Dragon voice recognition software that occasionally misinterpreted phrases or words.

## 2019-07-31 ENCOUNTER — TELEPHONE (OUTPATIENT)
Dept: ORTHOPEDICS | Facility: CLINIC | Age: 54
End: 2019-07-31

## 2019-08-02 ENCOUNTER — TELEPHONE (OUTPATIENT)
Dept: ORTHOPEDICS | Facility: CLINIC | Age: 54
End: 2019-08-02

## 2019-08-02 DIAGNOSIS — S83.282A OTHER TEAR OF LATERAL MENISCUS OF LEFT KNEE AS CURRENT INJURY, INITIAL ENCOUNTER: Primary | ICD-10-CM

## 2019-08-02 DIAGNOSIS — S83.282A ACUTE LATERAL MENISCAL TEAR, LEFT, INITIAL ENCOUNTER: ICD-10-CM

## 2019-08-02 DIAGNOSIS — S83.242A OTHER TEAR OF MEDIAL MENISCUS OF LEFT KNEE AS CURRENT INJURY, INITIAL ENCOUNTER: ICD-10-CM

## 2019-08-02 RX ORDER — SODIUM CHLORIDE 9 MG/ML
INJECTION, SOLUTION INTRAVENOUS CONTINUOUS
Status: CANCELLED | OUTPATIENT
Start: 2019-08-02

## 2019-08-02 RX ORDER — MUPIROCIN 20 MG/G
OINTMENT TOPICAL
Status: CANCELLED | OUTPATIENT
Start: 2019-08-02

## 2019-08-02 NOTE — TELEPHONE ENCOUNTER
----- Message from Christina Jensen sent at 8/1/2019 11:08 AM CDT -----  Contact: Patient  Patient ready to schedule left knee scope surgery, please call [phone]

## 2019-08-16 ENCOUNTER — HOSPITAL ENCOUNTER (OUTPATIENT)
Dept: RADIOLOGY | Facility: HOSPITAL | Age: 54
Discharge: HOME | End: 2019-08-16
Attending: ORTHOPAEDIC SURGERY
Payer: COMMERCIAL

## 2019-08-16 ENCOUNTER — HOSPITAL ENCOUNTER (OUTPATIENT)
Dept: PREADMISSION TESTING | Facility: HOSPITAL | Age: 54
Discharge: HOME | End: 2019-08-16
Attending: ORTHOPAEDIC SURGERY
Payer: COMMERCIAL

## 2019-08-16 VITALS
WEIGHT: 204.56 LBS | TEMPERATURE: 99 F | OXYGEN SATURATION: 95 % | HEIGHT: 70 IN | BODY MASS INDEX: 29.29 KG/M2 | SYSTOLIC BLOOD PRESSURE: 159 MMHG | RESPIRATION RATE: 18 BRPM | DIASTOLIC BLOOD PRESSURE: 89 MMHG | HEART RATE: 68 BPM

## 2019-08-16 DIAGNOSIS — S83.242A OTHER TEAR OF MEDIAL MENISCUS OF LEFT KNEE AS CURRENT INJURY, INITIAL ENCOUNTER: ICD-10-CM

## 2019-08-16 DIAGNOSIS — S83.282A OTHER TEAR OF LATERAL MENISCUS OF LEFT KNEE AS CURRENT INJURY, INITIAL ENCOUNTER: ICD-10-CM

## 2019-08-16 PROCEDURE — 71046 X-RAY EXAM CHEST 2 VIEWS: CPT | Mod: TC

## 2019-08-16 PROCEDURE — 93005 ELECTROCARDIOGRAM TRACING: CPT

## 2019-08-16 RX ORDER — POTASSIUM CHLORIDE 20 MEQ/15ML
99 SOLUTION ORAL DAILY
COMMUNITY
End: 2019-08-30 | Stop reason: CLARIF

## 2019-08-16 RX ORDER — CHOLECALCIFEROL (VITAMIN D3) 25 MCG
1000 TABLET ORAL NIGHTLY
COMMUNITY

## 2019-08-16 RX ORDER — MULTIVITAMIN
0.5 TABLET ORAL NIGHTLY
COMMUNITY

## 2019-08-16 RX ORDER — ASCORBIC ACID 500 MG
500 TABLET ORAL NIGHTLY
COMMUNITY

## 2019-08-16 NOTE — DISCHARGE INSTRUCTIONS
To confirm, Your doctor has instructed you that surgery is scheduled for: Wednesday August 21, 2019    Pre-Op will call the afternoon prior to surgery between 4:00 and 6:00 PM with the final arrival time. Will call Tuesday August 20,2019    Please report to [facility] [place] on [address] the morning of surgery.       PLEASE NOTE:  The surgery schedule has many variables which may affect the time of your surgery case.  Family members should be available if your surgery time changes.  Plan to be here the day of your procedure between 4-6 hours.    MEDICATIONS:  TAKE ONLY THESE MEDICATIONS WITH A SMALL SIP OF WATER THE MORNING OF YOUR PROCEDURE:      DO NOT TAKE THESE MEDICATIONS 5-7 DAYS PRIOR to your procedure or per your surgeon's request: ASPIRIN, ALEVE, ADVIL, IBUPROFEN, FISH OIL VITAMIN E, HERBALS  (May take Tylenol)        INSTRUCTIONS IMPORTANT!!  · Do not eat or drink anything between midnight and the time of your procedure- this includes gum, mints, and candy..  · Shower the night before AND the morning of your procedure with a Chlorhexidine wash such as Hibiclens or Dial antibacterial soap from the neck down.  Do not get it on your face or in your eyes.  You may use your own shampoo and face wash. This helps your skin to be as bacteria free as possible.    · If you wear contact lenses, dentures, hearing aids or glasses, bring a container to put them in during surgery and give to a family member for safe keeping.  Please leave all jewelry, piercing's and valuables at home.   · DO NOT remove hair from the surgery site.  Do not shave the incision site unless you are given specific instructions to do so.    · Make arrangements in advance for transportation home by a responsible adult.  · You must make arrangements for transportation, TAXI'S, UBER'S OR LYFTS ARE NOT ALLOWED.          If you have any questions about these instructions, call [facility]Op [facility]urs[facility] at [phone] or the Pre-Op Day Surgery Unit at  [phone].

## 2019-08-21 ENCOUNTER — ANESTHESIA EVENT (OUTPATIENT)
Dept: SURGERY | Facility: HOSPITAL | Age: 54
End: 2019-08-21
Payer: COMMERCIAL

## 2019-08-21 ENCOUNTER — ANESTHESIA (OUTPATIENT)
Dept: SURGERY | Facility: HOSPITAL | Age: 54
End: 2019-08-21
Payer: COMMERCIAL

## 2019-08-21 ENCOUNTER — HOSPITAL ENCOUNTER (OUTPATIENT)
Facility: HOSPITAL | Age: 54
Discharge: HOME | End: 2019-08-21
Attending: ORTHOPAEDIC SURGERY | Admitting: ORTHOPAEDIC SURGERY
Payer: COMMERCIAL

## 2019-08-21 VITALS
RESPIRATION RATE: 14 BRPM | DIASTOLIC BLOOD PRESSURE: 78 MMHG | TEMPERATURE: 98 F | HEART RATE: 68 BPM | BODY MASS INDEX: 29.77 KG/M2 | OXYGEN SATURATION: 98 % | HEIGHT: 69 IN | SYSTOLIC BLOOD PRESSURE: 122 MMHG | WEIGHT: 201 LBS

## 2019-08-21 DIAGNOSIS — S83.282A ACUTE LATERAL MENISCAL TEAR, LEFT, INITIAL ENCOUNTER: Primary | ICD-10-CM

## 2019-08-21 DIAGNOSIS — S83.282A OTHER TEAR OF LATERAL MENISCUS OF LEFT KNEE AS CURRENT INJURY, INITIAL ENCOUNTER: ICD-10-CM

## 2019-08-21 DIAGNOSIS — S83.242A OTHER TEAR OF MEDIAL MENISCUS OF LEFT KNEE AS CURRENT INJURY, INITIAL ENCOUNTER: ICD-10-CM

## 2019-08-21 PROCEDURE — 25000003 PHARM REV CODE 250: Performed by: ORTHOPAEDIC SURGERY

## 2019-08-21 PROCEDURE — 37000008 HC ANESTHESIA 1ST 15 MINUTES: Performed by: ORTHOPAEDIC SURGERY

## 2019-08-21 PROCEDURE — S0028 INJECTION, FAMOTIDINE, 20 MG: HCPCS | Performed by: NURSE ANESTHETIST, CERTIFIED REGISTERED

## 2019-08-21 PROCEDURE — 27000671 HC TUBING MICROBORE EXT: Performed by: STUDENT IN AN ORGANIZED HEALTH CARE EDUCATION/TRAINING PROGRAM

## 2019-08-21 PROCEDURE — 27201107 HC STYLET, STANDARD: Performed by: STUDENT IN AN ORGANIZED HEALTH CARE EDUCATION/TRAINING PROGRAM

## 2019-08-21 PROCEDURE — 37000009 HC ANESTHESIA EA ADD 15 MINS: Performed by: ORTHOPAEDIC SURGERY

## 2019-08-21 PROCEDURE — 71000016 HC POSTOP RECOV ADDL HR: Performed by: ORTHOPAEDIC SURGERY

## 2019-08-21 PROCEDURE — 71000015 HC POSTOP RECOV 1ST HR: Performed by: ORTHOPAEDIC SURGERY

## 2019-08-21 PROCEDURE — 27202103: Performed by: STUDENT IN AN ORGANIZED HEALTH CARE EDUCATION/TRAINING PROGRAM

## 2019-08-21 PROCEDURE — 63600175 PHARM REV CODE 636 W HCPCS: Performed by: NURSE ANESTHETIST, CERTIFIED REGISTERED

## 2019-08-21 PROCEDURE — 25000003 PHARM REV CODE 250: Performed by: ANESTHESIOLOGY

## 2019-08-21 PROCEDURE — 71000039 HC RECOVERY, EACH ADD'L HOUR: Performed by: ORTHOPAEDIC SURGERY

## 2019-08-21 PROCEDURE — 27201423 OPTIME MED/SURG SUP & DEVICES STERILE SUPPLY: Performed by: ORTHOPAEDIC SURGERY

## 2019-08-21 PROCEDURE — 71000033 HC RECOVERY, INTIAL HOUR: Performed by: ORTHOPAEDIC SURGERY

## 2019-08-21 PROCEDURE — 63600175 PHARM REV CODE 636 W HCPCS: Performed by: ANESTHESIOLOGY

## 2019-08-21 PROCEDURE — 63600175 PHARM REV CODE 636 W HCPCS: Performed by: ORTHOPAEDIC SURGERY

## 2019-08-21 PROCEDURE — 27000673 HC TUBING BLOOD Y: Performed by: STUDENT IN AN ORGANIZED HEALTH CARE EDUCATION/TRAINING PROGRAM

## 2019-08-21 PROCEDURE — 36000711: Performed by: ORTHOPAEDIC SURGERY

## 2019-08-21 PROCEDURE — 25000003 PHARM REV CODE 250: Performed by: NURSE ANESTHETIST, CERTIFIED REGISTERED

## 2019-08-21 PROCEDURE — 36000710: Performed by: ORTHOPAEDIC SURGERY

## 2019-08-21 PROCEDURE — 27000654 HC CATH IV JELCO: Performed by: STUDENT IN AN ORGANIZED HEALTH CARE EDUCATION/TRAINING PROGRAM

## 2019-08-21 PROCEDURE — 27202105 HC BIS BILATERAL SENSOR: Performed by: STUDENT IN AN ORGANIZED HEALTH CARE EDUCATION/TRAINING PROGRAM

## 2019-08-21 RX ORDER — DEXAMETHASONE SODIUM PHOSPHATE 4 MG/ML
INJECTION, SOLUTION INTRA-ARTICULAR; INTRALESIONAL; INTRAMUSCULAR; INTRAVENOUS; SOFT TISSUE
Status: DISCONTINUED | OUTPATIENT
Start: 2019-08-21 | End: 2019-08-21

## 2019-08-21 RX ORDER — HYDROCODONE BITARTRATE AND ACETAMINOPHEN 5; 325 MG/1; MG/1
1 TABLET ORAL EVERY 4 HOURS PRN
Status: DISCONTINUED | OUTPATIENT
Start: 2019-08-21 | End: 2019-08-21 | Stop reason: HOSPADM

## 2019-08-21 RX ORDER — CEFAZOLIN SODIUM 2 G/50ML
2 SOLUTION INTRAVENOUS
Status: COMPLETED | OUTPATIENT
Start: 2019-08-21 | End: 2019-08-21

## 2019-08-21 RX ORDER — SODIUM CHLORIDE 9 MG/ML
INJECTION, SOLUTION INTRAVENOUS CONTINUOUS
Status: DISCONTINUED | OUTPATIENT
Start: 2019-08-21 | End: 2019-08-21 | Stop reason: HOSPADM

## 2019-08-21 RX ORDER — ACETAMINOPHEN 10 MG/ML
1000 INJECTION, SOLUTION INTRAVENOUS ONCE
Status: COMPLETED | OUTPATIENT
Start: 2019-08-21 | End: 2019-08-21

## 2019-08-21 RX ORDER — MUPIROCIN 20 MG/G
OINTMENT TOPICAL
Status: DISCONTINUED | OUTPATIENT
Start: 2019-08-21 | End: 2019-08-21

## 2019-08-21 RX ORDER — ROCURONIUM BROMIDE 10 MG/ML
INJECTION, SOLUTION INTRAVENOUS
Status: DISCONTINUED | OUTPATIENT
Start: 2019-08-21 | End: 2019-08-21

## 2019-08-21 RX ORDER — LIDOCAINE HYDROCHLORIDE 20 MG/ML
INJECTION, SOLUTION EPIDURAL; INFILTRATION; INTRACAUDAL; PERINEURAL
Status: DISCONTINUED | OUTPATIENT
Start: 2019-08-21 | End: 2019-08-21

## 2019-08-21 RX ORDER — BUPIVACAINE HYDROCHLORIDE AND EPINEPHRINE 5; 5 MG/ML; UG/ML
INJECTION, SOLUTION EPIDURAL; INTRACAUDAL; PERINEURAL
Status: DISCONTINUED | OUTPATIENT
Start: 2019-08-21 | End: 2019-08-21 | Stop reason: HOSPADM

## 2019-08-21 RX ORDER — SODIUM CHLORIDE 0.9 % (FLUSH) 0.9 %
10 SYRINGE (ML) INJECTION
Status: DISCONTINUED | OUTPATIENT
Start: 2019-08-21 | End: 2019-08-21 | Stop reason: HOSPADM

## 2019-08-21 RX ORDER — MUPIROCIN 20 MG/G
1 OINTMENT TOPICAL 2 TIMES DAILY
Status: DISCONTINUED | OUTPATIENT
Start: 2019-08-21 | End: 2019-08-21 | Stop reason: HOSPADM

## 2019-08-21 RX ORDER — ONDANSETRON 2 MG/ML
4 INJECTION INTRAMUSCULAR; INTRAVENOUS DAILY PRN
Status: DISCONTINUED | OUTPATIENT
Start: 2019-08-21 | End: 2019-08-21 | Stop reason: HOSPADM

## 2019-08-21 RX ORDER — PROPOFOL 10 MG/ML
VIAL (ML) INTRAVENOUS
Status: DISCONTINUED | OUTPATIENT
Start: 2019-08-21 | End: 2019-08-21

## 2019-08-21 RX ORDER — FAMOTIDINE 10 MG/ML
INJECTION INTRAVENOUS
Status: DISCONTINUED | OUTPATIENT
Start: 2019-08-21 | End: 2019-08-21

## 2019-08-21 RX ORDER — SODIUM CHLORIDE, SODIUM LACTATE, POTASSIUM CHLORIDE, CALCIUM CHLORIDE 600; 310; 30; 20 MG/100ML; MG/100ML; MG/100ML; MG/100ML
INJECTION, SOLUTION INTRAVENOUS CONTINUOUS PRN
Status: DISCONTINUED | OUTPATIENT
Start: 2019-08-21 | End: 2019-08-21

## 2019-08-21 RX ORDER — SODIUM CHLORIDE 0.9 G/100ML
IRRIGANT IRRIGATION
Status: DISCONTINUED | OUTPATIENT
Start: 2019-08-21 | End: 2019-08-21 | Stop reason: HOSPADM

## 2019-08-21 RX ORDER — FENTANYL CITRATE 50 UG/ML
25 INJECTION, SOLUTION INTRAMUSCULAR; INTRAVENOUS EVERY 5 MIN PRN
Status: DISCONTINUED | OUTPATIENT
Start: 2019-08-21 | End: 2019-08-21 | Stop reason: HOSPADM

## 2019-08-21 RX ORDER — OXYCODONE HYDROCHLORIDE 5 MG/1
5 TABLET ORAL
Status: DISCONTINUED | OUTPATIENT
Start: 2019-08-21 | End: 2019-08-21 | Stop reason: HOSPADM

## 2019-08-21 RX ORDER — SUCCINYLCHOLINE CHLORIDE 20 MG/ML
INJECTION INTRAMUSCULAR; INTRAVENOUS
Status: DISCONTINUED | OUTPATIENT
Start: 2019-08-21 | End: 2019-08-21

## 2019-08-21 RX ORDER — ONDANSETRON 2 MG/ML
INJECTION INTRAMUSCULAR; INTRAVENOUS
Status: DISCONTINUED | OUTPATIENT
Start: 2019-08-21 | End: 2019-08-21

## 2019-08-21 RX ORDER — FENTANYL CITRATE 50 UG/ML
INJECTION, SOLUTION INTRAMUSCULAR; INTRAVENOUS
Status: DISCONTINUED | OUTPATIENT
Start: 2019-08-21 | End: 2019-08-21

## 2019-08-21 RX ORDER — SCOLOPAMINE TRANSDERMAL SYSTEM 1 MG/1
1 PATCH, EXTENDED RELEASE TRANSDERMAL
Status: DISCONTINUED | OUTPATIENT
Start: 2019-08-21 | End: 2019-08-21 | Stop reason: HOSPADM

## 2019-08-21 RX ORDER — METHYLPREDNISOLONE ACETATE 80 MG/ML
INJECTION, SUSPENSION INTRA-ARTICULAR; INTRALESIONAL; INTRAMUSCULAR; SOFT TISSUE
Status: DISCONTINUED | OUTPATIENT
Start: 2019-08-21 | End: 2019-08-21 | Stop reason: HOSPADM

## 2019-08-21 RX ORDER — MUPIROCIN 20 MG/G
OINTMENT TOPICAL
Status: DISCONTINUED | OUTPATIENT
Start: 2019-08-21 | End: 2019-08-21 | Stop reason: HOSPADM

## 2019-08-21 RX ORDER — MIDAZOLAM HYDROCHLORIDE 1 MG/ML
INJECTION INTRAMUSCULAR; INTRAVENOUS
Status: DISCONTINUED | OUTPATIENT
Start: 2019-08-21 | End: 2019-08-21

## 2019-08-21 RX ADMIN — MIDAZOLAM HYDROCHLORIDE 1 MG: 1 INJECTION, SOLUTION INTRAMUSCULAR; INTRAVENOUS at 07:08

## 2019-08-21 RX ADMIN — DEXAMETHASONE SODIUM PHOSPHATE 8 MG: 4 INJECTION, SOLUTION INTRAMUSCULAR; INTRAVENOUS at 07:08

## 2019-08-21 RX ADMIN — PROPOFOL 30 MG: 10 INJECTION, EMULSION INTRAVENOUS at 07:08

## 2019-08-21 RX ADMIN — ROCURONIUM BROMIDE 5 MG: 10 INJECTION, SOLUTION INTRAVENOUS at 07:08

## 2019-08-21 RX ADMIN — OXYCODONE HYDROCHLORIDE 5 MG: 5 TABLET ORAL at 08:08

## 2019-08-21 RX ADMIN — FENTANYL CITRATE 50 MCG: 50 INJECTION INTRAMUSCULAR; INTRAVENOUS at 07:08

## 2019-08-21 RX ADMIN — PROPOFOL 150 MG: 10 INJECTION, EMULSION INTRAVENOUS at 07:08

## 2019-08-21 RX ADMIN — LIDOCAINE HYDROCHLORIDE 100 MG: 20 INJECTION, SOLUTION EPIDURAL; INFILTRATION; INTRACAUDAL; PERINEURAL at 07:08

## 2019-08-21 RX ADMIN — CEFAZOLIN SODIUM 2 G: 2 SOLUTION INTRAVENOUS at 07:08

## 2019-08-21 RX ADMIN — SCOPOLAMINE 1 PATCH: 1 PATCH TRANSDERMAL at 06:08

## 2019-08-21 RX ADMIN — SODIUM CHLORIDE, SODIUM LACTATE, POTASSIUM CHLORIDE, AND CALCIUM CHLORIDE: .6; .31; .03; .02 INJECTION, SOLUTION INTRAVENOUS at 06:08

## 2019-08-21 RX ADMIN — ONDANSETRON 4 MG: 2 INJECTION INTRAMUSCULAR; INTRAVENOUS at 08:08

## 2019-08-21 RX ADMIN — SUCCINYLCHOLINE CHLORIDE 120 MG: 20 INJECTION, SOLUTION INTRAMUSCULAR; INTRAVENOUS at 07:08

## 2019-08-21 RX ADMIN — FAMOTIDINE 20 MG: 10 INJECTION, SOLUTION INTRAVENOUS at 07:08

## 2019-08-21 RX ADMIN — FENTANYL CITRATE 25 MCG: 50 INJECTION, SOLUTION INTRAMUSCULAR; INTRAVENOUS at 08:08

## 2019-08-21 RX ADMIN — ACETAMINOPHEN 1000 MG: 10 INJECTION, SOLUTION INTRAVENOUS at 07:08

## 2019-08-21 RX ADMIN — ONDANSETRON 4 MG: 2 INJECTION INTRAMUSCULAR; INTRAVENOUS at 07:08

## 2019-08-21 NOTE — INTERVAL H&P NOTE
The patient has been examined and the H&P has been reviewed:    I concur with the findings and no changes have occurred since H&P was written.    Anesthesia/Surgery risks, benefits and alternative options discussed and understood by patient/family.          Active Hospital Problems    Diagnosis  POA    Acute lateral meniscal tear, left, initial encounter [S85.066A]  Yes      Resolved Hospital Problems   No resolved problems to display.

## 2019-08-21 NOTE — DISCHARGE INSTRUCTIONS
REMOVE BANDAGES IN 48 HOURS , REPLACE WITH BANDAGES, WEIGHT BEARING AS TOLERATED   .NO DRIVING, DRINKING ALCOHOL AND NO SIGNING LEGAL DOCUMENTS FOR 24 HOURS OR WHILE TAKING PAIN MEDICATIONS.  DO NOT SHOWER FOR 24 HOURS.  KEEP DRESSING DRY TO PREVENT INFECTION.  NO HEAVY LIFTING UNTIL YOU ARE RELEASED FROM YOUR DOCTOR.   NOTIFY YOUR DOCTOR FOR UNCONTROLLED PAIN, TEMPERATURE GREATER THAN 100.5 AND/OR UNCONTROLLED NAUSEA/VOMITING

## 2019-08-21 NOTE — OP NOTE
[facility]  Surgery Department  Operative Note    SUMMARY     Date of Procedure: 8/21/2019     Procedure: partial medial and lateral meniscectomies left knee    Surgeon(s) and Role:     * Porter Cifuentes MD - Primary    Assisting Surgeon: John    Pre-Operative Diagnosis: Other tear of lateral meniscus of left knee as current injury, initial encounter [S83.282A]  Other tear of medial meniscus of left knee as current injury, initial encounter [S83.242A]    Post-Operative Diagnosis: Post-Op Diagnosis Codes:     * Other tear of lateral meniscus of left knee as current injury, initial encounter [S83.282A]     * Other tear of medial meniscus of left knee as current injury, initial encounter [S83.242A]    Anesthesia: General    Intraoperative Findings: the patellofemoral joint was noted to track centrally. 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 There was grade 3 and 4 chondromalacia on the trochlea. The anterior cruciate ligament and PCL were intact. The lateral compartment demonstrated an area of grade 4 chondromalacia on the tibial plateau. There was a shredded posterior horn lateral meniscal tear. The medial compartment demonstrated a truncated shredded medial meniscus. The cartilaginous surfaces demonstrated grade 2 chondromalacia throughout.    Description of the Findings of the Procedure: the patient was placed on the operating table in the supine position. She was prepped and draped in the usual sterile manner for surgery. Inferomedial and inferolateral portals were established and diagnostic arthroscopy was undertaken. The findings of those stated above. At this point I turned my attention to the lateral compartment. There is serous of magnolia and biters were utilized and 50% of the posterior horn of the lateral meniscus was excised and balanced into the body. We now turned our attention to the medial compartment. There the shaver was utilized and the meniscal remnant was essentially  excised. We now removed the arthroscopic equipment. Portals were closed with nylon stitches.    Complications: No    Estimated Blood Loss (EBL): * No values recorded between 8/21/2019  7:28 AM and 8/21/2019  7:35 AM *           Implants: * No implants in log *    Specimens:   Specimen (12h ago, onward)    None                  Condition: Good    Disposition: PACU - hemodynamically stable.                 Discharge Note    SUMMARY     Admit Date: 8/21/2019    Discharge Date and Time:  08/21/2019 7:35 AM    Hospital Course (synopsis of major diagnoses, care, treatment, and services provided during the course of the hospital stay): Uneventful      Final Diagnosis: Post-Op Diagnosis Codes:     * Other tear of lateral meniscus of left knee as current injury, initial encounter [S83.282A]     * Other tear of medial meniscus of left knee as current injury, initial encounter [S83.242A]    Disposition: Home or Self Care    Follow Up/Patient Instructions:     Medications:  Reconciled Home Medications:   Current Discharge Medication List      CONTINUE these medications which have NOT CHANGED    Details   ARMOUR THYROID 90 mg Tab TAKE 1 TABLET BY MOUTH ONCE ON MONDAY, WEDNESDAY, AND FRIDAY  Refills: 5      ascorbic acid, vitamin C, (VITAMIN C) 500 MG tablet Take 500 mg by mouth once daily.      estradiol (ESTRACE) 2 MG tablet Take 2 mg by mouth once daily.  Refills: 1      meclizine (ANTIVERT) 25 mg tablet Take 25 mg by mouth 3 (three) times daily as needed.  Refills: 0      meloxicam (MOBIC) 15 MG tablet Take 1 tablet (15 mg total) by mouth once daily.  Qty: 30 tablet, Refills: 2    Associated Diagnoses: Closed nondisplaced fracture of lateral malleolus of left fibula with routine healing, subsequent encounter      metoprolol succinate (TOPROL-XL) 25 MG 24 hr tablet Take 25 mg by mouth once daily.  Refills: 3      multivitamin capsule Take 0.5 capsules by mouth once daily.      potassium chloride 10% (KAYCIEL) 20 mEq/15 mL oral  solution Take 99 mEq by mouth once daily.       vitamin D (VITAMIN D3) 1000 units Tab Take 1,000 Units by mouth once daily.      KRILL OIL ORAL Take 1,000 mg by mouth once daily.           No discharge procedures on file.

## 2019-08-21 NOTE — ANESTHESIA POSTPROCEDURE EVALUATION
Anesthesia Post Evaluation    Patient: Mariah Ashton    Procedure(s) Performed: Procedure(s) (LRB):  ARTHROSCOPY, KNEE (Left)  MENISCECTOMY, KNEE, MEDIAL (Left)    Final Anesthesia Type: general  Patient location during evaluation: PACU  Patient participation: Yes- Able to Participate  Level of consciousness: awake and alert  Post-procedure vital signs: reviewed and stable  Pain management: adequate  Airway patency: patent  PONV status at discharge: No PONV  Anesthetic complications: no      Cardiovascular status: hemodynamically stable  Respiratory status: unassisted, spontaneous ventilation and room air  Hydration status: euvolemic  Follow-up not needed.          Vitals Value Taken Time   /74 8/21/2019  8:30 AM   Temp 36.6 °C (97.8 °F) 8/21/2019  7:47 AM   Pulse 74 8/21/2019  8:37 AM   Resp 17 8/21/2019  8:37 AM   SpO2 95 % 8/21/2019  8:37 AM   Vitals shown include unvalidated device data.      No case tracking events are documented in the log.      Pain/Isrrael Score: Pain Rating Prior to Med Admin: 2 (8/21/2019  8:18 AM)  Isrrael Score: 9 (8/21/2019  8:00 AM)

## 2019-08-21 NOTE — TRANSFER OF CARE
"Anesthesia Transfer of Care Note    Patient: Mariah Ashton    Procedure(s) Performed: Procedure(s) (LRB):  ARTHROSCOPY, KNEE (Left)  MENISCECTOMY, KNEE, MEDIAL (Left)    Patient location: PACU    Anesthesia Type: general    Transport from OR: Transported from OR on room air with adequate spontaneous ventilation    Post pain: adequate analgesia    Post assessment: no apparent anesthetic complications    Post vital signs: stable    Level of consciousness: awake and alert    Nausea/Vomiting: no nausea/vomiting    Complications: none    Transfer of care protocol was followedComments: To PACU. Pt in NAD. VSS. Report to RN per protocol.        Last vitals:   Visit Vitals  /88 (BP Location: Left arm, Patient Position: Lying)   Pulse 75   Temp 36.6 °C (97.8 °F) (Temporal)   Resp 16   Ht 5' 9" (1.753 m)   Wt 91.2 kg (201 lb)   SpO2 99%   Breastfeeding? No   BMI 29.68 kg/m²     "

## 2019-08-29 ENCOUNTER — OFFICE VISIT (OUTPATIENT)
Dept: ORTHOPEDICS | Facility: CLINIC | Age: 54
End: 2019-08-29
Payer: COMMERCIAL

## 2019-08-29 VITALS
HEIGHT: 69 IN | BODY MASS INDEX: 29.77 KG/M2 | WEIGHT: 201 LBS | HEART RATE: 63 BPM | SYSTOLIC BLOOD PRESSURE: 120 MMHG | DIASTOLIC BLOOD PRESSURE: 82 MMHG

## 2019-08-29 DIAGNOSIS — Z98.890 STATUS POST ARTHROSCOPIC SURGERY OF LEFT KNEE: Primary | ICD-10-CM

## 2019-08-29 PROCEDURE — 99024 POSTOP FOLLOW-UP VISIT: CPT | Mod: S$GLB,,, | Performed by: ORTHOPAEDIC SURGERY

## 2019-08-29 PROCEDURE — 99024 PR POST-OP FOLLOW-UP VISIT: ICD-10-PCS | Mod: S$GLB,,, | Performed by: ORTHOPAEDIC SURGERY

## 2019-08-29 NOTE — PROGRESS NOTES
[facility] ORTHOPEDICS POST-OP NOTE    Subjective:           Chief Complaint:   Chief Complaint   Patient presents with    Left Knee - Post-op Evaluation     Left knee scope 8.21.19. Suture removal. States that her knee is doing ok.        Past Medical History:   Diagnosis Date    Bradycardia     Thyroid disease        Past Surgical History:   Procedure Laterality Date    ARTHROSCOPY, KNEE Left 8/21/2019    Performed by [facility] at [facility] OR    ATRIAL CARDIAC PACEMAKER INSERTION      HYSTERECTOMY  1999    INSERTION OF PACEMAKER Left 12/2014    KNEE ARTHROSCOPY      left shoulder      MENISCECTOMY, KNEE,  partial,MEDIAL,lateral Left 8/21/2019    Performed by [facility] at [facility] OR    SURGICAL REMOVAL OF BONE SPUR Left 2016    Removed from leftr shoulder       Current Outpatient Medications   Medication Sig    ARMOUR THYROID 90 mg Tab TAKE 1 TABLET BY MOUTH ONCE ON MONDAY, WEDNESDAY, AND FRIDAY    ascorbic acid, vitamin C, (VITAMIN C) 500 MG tablet Take 500 mg by mouth once daily.    estradiol (ESTRACE) 2 MG tablet Take 2 mg by mouth once daily.    KRILL OIL ORAL Take 1,000 mg by mouth once daily.    meclizine (ANTIVERT) 25 mg tablet Take 25 mg by mouth 3 (three) times daily as needed.    metoprolol succinate (TOPROL-XL) 25 MG 24 hr tablet Take 25 mg by mouth once daily.    multivitamin capsule Take 0.5 capsules by mouth once daily.    potassium chloride 10% (KAYCIEL) 20 mEq/15 mL oral solution Take 99 mEq by mouth once daily.     vitamin D (VITAMIN D3) 1000 units Tab Take 1,000 Units by mouth once daily.     No current facility-administered medications for this visit.        Review of patient's allergies indicates:  No Known Allergies    Family History   Problem Relation Age of Onset    No Known Problems Mother     No Known Problems Father        Social History     Socioeconomic History    Marital status:      Spouse name: Not on file    Number of children: Not on file     Years of education: Not on file    Highest education level: Not on file   Occupational History    Not on file   Social Needs    Financial resource strain: Not on file    Food insecurity:     Worry: Not on file     Inability: Not on file    Transportation needs:     Medical: Not on file     Non-medical: Not on file   Tobacco Use    Smoking status: Former Smoker     Packs/day: 0.50     Years: 10.00     Pack years: 5.00     Start date:      Last attempt to quit:      Years since quittin.6    Smokeless tobacco: Never Used   Substance and Sexual Activity    Alcohol use: Yes     Comment: occassional    Drug use: Not Currently    Sexual activity: Not on file   Lifestyle    Physical activity:     Days per week: Not on file     Minutes per session: Not on file    Stress: Not on file   Relationships    Social connections:     Talks on phone: Not on file     Gets together: Not on file     Attends Rastafarian service: Not on file     Active member of club or organization: Not on file     Attends meetings of clubs or organizations: Not on file     Relationship status: Not on file   Other Topics Concern    Not on file   Social History Narrative    Not on file       History of present illness: Patient comes in today for her left knee. She is doing very well postoperatively.      Review of Systems:    Musculoskeletal:  See HPI      Objective:        Physical Examination:    Vital Signs:    Vitals:    19 0842   BP: 120/82   Pulse: 63       Body mass index is 29.68 kg/m².    This a well-developed, well nourished patient in no acute distress.  They are alert and oriented and cooperative to examination.        Wounds are clean dry and intact. She's neurovascularly intact at the foot. Range of motion is 0-120.  Pertinent New Results:    XRAY Report / Interpretation:       Assessment/Plan:      Stable following arthroscopy of the knee. I went over the findings with her. We went over all the pictures. I  showed her the areas in her knee which she has severe cartilaginous loss. We will check her back in 4 weeks    This note was created using Dragon voice recognition software that occasionally misinterpreted phrases or words.

## 2019-08-30 ENCOUNTER — HOSPITAL ENCOUNTER (OUTPATIENT)
Facility: HOSPITAL | Age: 54
Discharge: HOME | End: 2019-08-31
Attending: EMERGENCY MEDICINE | Admitting: INTERNAL MEDICINE
Payer: COMMERCIAL

## 2019-08-30 DIAGNOSIS — R07.9 CHEST PAIN: ICD-10-CM

## 2019-08-30 DIAGNOSIS — R55 SYNCOPE, UNSPECIFIED SYNCOPE TYPE: Primary | ICD-10-CM

## 2019-08-30 PROBLEM — E87.6 HYPOKALEMIA: Status: ACTIVE | Noted: 2019-08-30

## 2019-08-30 LAB
ALBUMIN SERPL BCP-MCNC: 3.6 G/DL (ref 3.5–5.2)
ALP SERPL-CCNC: 68 U/L (ref 55–135)
ALT SERPL W/O P-5'-P-CCNC: 14 U/L (ref 10–44)
ANION GAP SERPL CALC-SCNC: 15 MMOL/L (ref 8–16)
AST SERPL-CCNC: 21 U/L (ref 10–40)
BASOPHILS # BLD AUTO: 0.06 K/UL (ref 0–0.2)
BASOPHILS NFR BLD: 0.6 % (ref 0–1.9)
BILIRUB SERPL-MCNC: 0.6 MG/DL (ref 0.1–1)
BNP SERPL-MCNC: 36 PG/ML (ref 0–99)
BUN SERPL-MCNC: 23 MG/DL (ref 6–20)
CALCIUM SERPL-MCNC: 9 MG/DL (ref 8.7–10.5)
CHLORIDE SERPL-SCNC: 100 MMOL/L (ref 95–110)
CO2 SERPL-SCNC: 24 MMOL/L (ref 23–29)
CREAT SERPL-MCNC: 0.7 MG/DL (ref 0.5–1.4)
D DIMER PPP IA.FEU-MCNC: 0.58 UG/ML FEU
DIFFERENTIAL METHOD: ABNORMAL
EOSINOPHIL # BLD AUTO: 0.1 K/UL (ref 0–0.5)
EOSINOPHIL NFR BLD: 1.2 % (ref 0–8)
ERYTHROCYTE [DISTWIDTH] IN BLOOD BY AUTOMATED COUNT: 12.5 % (ref 11.5–14.5)
EST. GFR  (AFRICAN AMERICAN): >60 ML/MIN/1.73 M^2
EST. GFR  (NON AFRICAN AMERICAN): >60 ML/MIN/1.73 M^2
GLUCOSE SERPL-MCNC: 85 MG/DL (ref 70–110)
HCT VFR BLD AUTO: 43.9 % (ref 37–48.5)
HGB BLD-MCNC: 14.5 G/DL (ref 12–16)
IMM GRANULOCYTES # BLD AUTO: 0.04 K/UL (ref 0–0.04)
IMM GRANULOCYTES NFR BLD AUTO: 0.4 % (ref 0–0.5)
LYMPHOCYTES # BLD AUTO: 2.7 K/UL (ref 1–4.8)
LYMPHOCYTES NFR BLD: 26.5 % (ref 18–48)
MCH RBC QN AUTO: 29.8 PG (ref 27–31)
MCHC RBC AUTO-ENTMCNC: 33 G/DL (ref 32–36)
MCV RBC AUTO: 90 FL (ref 82–98)
MONOCYTES # BLD AUTO: 0.7 K/UL (ref 0.3–1)
MONOCYTES NFR BLD: 6.8 % (ref 4–15)
NEUTROPHILS # BLD AUTO: 6.6 K/UL (ref 1.8–7.7)
NEUTROPHILS NFR BLD: 64.5 % (ref 38–73)
NRBC BLD-RTO: 0 /100 WBC
PLATELET # BLD AUTO: 285 K/UL (ref 150–350)
PMV BLD AUTO: 9 FL (ref 9.2–12.9)
POTASSIUM SERPL-SCNC: 3.1 MMOL/L (ref 3.5–5.1)
PROT SERPL-MCNC: 7.1 G/DL (ref 6–8.4)
RBC # BLD AUTO: 4.87 M/UL (ref 4–5.4)
SODIUM SERPL-SCNC: 139 MMOL/L (ref 136–145)
TROPONIN I SERPL DL<=0.01 NG/ML-MCNC: <0.03 NG/ML (ref 0.02–0.04)
TROPONIN I SERPL DL<=0.01 NG/ML-MCNC: <0.03 NG/ML (ref 0.02–0.04)
WBC # BLD AUTO: 10.28 K/UL (ref 3.9–12.7)

## 2019-08-30 PROCEDURE — 63600175 PHARM REV CODE 636 W HCPCS: Performed by: NURSE PRACTITIONER

## 2019-08-30 PROCEDURE — 83880 ASSAY OF NATRIURETIC PEPTIDE: CPT

## 2019-08-30 PROCEDURE — 93005 ELECTROCARDIOGRAM TRACING: CPT

## 2019-08-30 PROCEDURE — 99285 EMERGENCY DEPT VISIT HI MDM: CPT | Mod: 25

## 2019-08-30 PROCEDURE — G0378 HOSPITAL OBSERVATION PER HR: HCPCS

## 2019-08-30 PROCEDURE — 85379 FIBRIN DEGRADATION QUANT: CPT

## 2019-08-30 PROCEDURE — 25000003 PHARM REV CODE 250: Performed by: NURSE PRACTITIONER

## 2019-08-30 PROCEDURE — 84484 ASSAY OF TROPONIN QUANT: CPT

## 2019-08-30 PROCEDURE — 94760 N-INVAS EAR/PLS OXIMETRY 1: CPT

## 2019-08-30 PROCEDURE — 80053 COMPREHEN METABOLIC PANEL: CPT

## 2019-08-30 PROCEDURE — 36415 COLL VENOUS BLD VENIPUNCTURE: CPT

## 2019-08-30 PROCEDURE — 85025 COMPLETE CBC W/AUTO DIFF WBC: CPT

## 2019-08-30 RX ORDER — LANOLIN ALCOHOL/MO/W.PET/CERES
800 CREAM (GRAM) TOPICAL
Status: DISCONTINUED | OUTPATIENT
Start: 2019-08-30 | End: 2019-08-31 | Stop reason: HOSPADM

## 2019-08-30 RX ORDER — ENOXAPARIN SODIUM 100 MG/ML
40 INJECTION SUBCUTANEOUS EVERY 24 HOURS
Status: DISCONTINUED | OUTPATIENT
Start: 2019-08-30 | End: 2019-08-31 | Stop reason: HOSPADM

## 2019-08-30 RX ORDER — POTASSIUM CHLORIDE 20 MEQ/15ML
40 SOLUTION ORAL
Status: DISCONTINUED | OUTPATIENT
Start: 2019-08-30 | End: 2019-08-31 | Stop reason: HOSPADM

## 2019-08-30 RX ORDER — OXYCODONE AND ACETAMINOPHEN 7.5; 325 MG/1; MG/1
1 TABLET ORAL EVERY 4 HOURS PRN
COMMUNITY
End: 2019-09-26

## 2019-08-30 RX ORDER — ACETAMINOPHEN 325 MG/1
650 TABLET ORAL EVERY 4 HOURS PRN
Status: DISCONTINUED | OUTPATIENT
Start: 2019-08-30 | End: 2019-08-31 | Stop reason: HOSPADM

## 2019-08-30 RX ORDER — ASCORBIC ACID 500 MG
500 TABLET ORAL NIGHTLY
Status: DISCONTINUED | OUTPATIENT
Start: 2019-08-30 | End: 2019-08-31 | Stop reason: HOSPADM

## 2019-08-30 RX ORDER — ESTRADIOL 1 MG/1
2 TABLET ORAL NIGHTLY
Status: DISCONTINUED | OUTPATIENT
Start: 2019-08-30 | End: 2019-08-31 | Stop reason: HOSPADM

## 2019-08-30 RX ORDER — SODIUM CHLORIDE 0.9 % (FLUSH) 0.9 %
10 SYRINGE (ML) INJECTION
Status: DISCONTINUED | OUTPATIENT
Start: 2019-08-30 | End: 2019-08-31 | Stop reason: HOSPADM

## 2019-08-30 RX ORDER — ONDANSETRON 2 MG/ML
4 INJECTION INTRAMUSCULAR; INTRAVENOUS EVERY 8 HOURS PRN
Status: DISCONTINUED | OUTPATIENT
Start: 2019-08-30 | End: 2019-08-31 | Stop reason: HOSPADM

## 2019-08-30 RX ORDER — THYROID 90 MG/1
90 TABLET ORAL
Status: DISCONTINUED | OUTPATIENT
Start: 2019-08-31 | End: 2019-08-31 | Stop reason: HOSPADM

## 2019-08-30 RX ORDER — MAGNESIUM HYDROXIDE 400 MG/5ML
1 SUSPENSION, ORAL (FINAL DOSE FORM) ORAL NIGHTLY
COMMUNITY

## 2019-08-30 RX ORDER — METOPROLOL SUCCINATE 25 MG/1
25 TABLET, EXTENDED RELEASE ORAL NIGHTLY
Status: DISCONTINUED | OUTPATIENT
Start: 2019-08-30 | End: 2019-08-31 | Stop reason: HOSPADM

## 2019-08-30 RX ORDER — CHOLECALCIFEROL (VITAMIN D3) 25 MCG
1000 TABLET ORAL NIGHTLY
Status: DISCONTINUED | OUTPATIENT
Start: 2019-08-30 | End: 2019-08-31 | Stop reason: HOSPADM

## 2019-08-30 RX ORDER — POTASSIUM CHLORIDE 20 MEQ/15ML
60 SOLUTION ORAL
Status: DISCONTINUED | OUTPATIENT
Start: 2019-08-30 | End: 2019-08-31 | Stop reason: HOSPADM

## 2019-08-30 RX ORDER — SODIUM CHLORIDE, SODIUM LACTATE, POTASSIUM CHLORIDE, CALCIUM CHLORIDE 600; 310; 30; 20 MG/100ML; MG/100ML; MG/100ML; MG/100ML
INJECTION, SOLUTION INTRAVENOUS CONTINUOUS
Status: DISCONTINUED | OUTPATIENT
Start: 2019-08-30 | End: 2019-08-31 | Stop reason: HOSPADM

## 2019-08-30 RX ADMIN — METOPROLOL SUCCINATE 25 MG: 25 TABLET, FILM COATED, EXTENDED RELEASE ORAL at 10:08

## 2019-08-30 RX ADMIN — SODIUM CHLORIDE, SODIUM LACTATE, POTASSIUM CHLORIDE, AND CALCIUM CHLORIDE: .6; .31; .03; .02 INJECTION, SOLUTION INTRAVENOUS at 11:08

## 2019-08-30 RX ADMIN — ESTRADIOL 2 MG: 1 TABLET ORAL at 10:08

## 2019-08-30 RX ADMIN — CHOLECALCIFEROL (VITAMIN D3) 25 MCG (1,000 UNIT) TABLET 1000 UNITS: at 10:08

## 2019-08-30 RX ADMIN — POTASSIUM BICARBONATE 50 MEQ: 977.5 TABLET, EFFERVESCENT ORAL at 08:08

## 2019-08-30 RX ADMIN — OXYCODONE HYDROCHLORIDE AND ACETAMINOPHEN 500 MG: 500 TABLET ORAL at 10:08

## 2019-08-30 NOTE — ED NOTES
Pacer interrigated-device not found.  Will call [facility].

## 2019-08-30 NOTE — ED PROVIDER NOTES
Encounter Date: 2019       History     Chief Complaint   Patient presents with    Pre Syncope     54-year-old female with history of symptomatic bradycardia requiring pacemaker placement.  Patient followed by Dr. Jim Lynn.  Patient presents to the emergency department via EMS after she had a witnessed syncopal event while at work.  Patient states that she was seated at her desk in which she felt nauseated week dizzy.  Patient states that everything began to get dark and she was told that she had passed out test.  Per witnesses reports no seizure activity was reported.  Patient denied any recent history of illness including no nausea, vomiting, abdominal pain, chest pain, fever, no URI symptoms. Patient on arrival to the emergency department states that she still feels slightly out of it however she denies chest pain, shortness of breath, no abdominal pain, no other constitutional symptoms.        Review of patient's allergies indicates:  No Known Allergies  Past Medical History:   Diagnosis Date    Bradycardia     Thyroid disease      Past Surgical History:   Procedure Laterality Date    ARTHROSCOPY, KNEE Left 2019    [facility] OR    ATRIAL CARDIAC PACEMAKER INSERTION      HYSTERECTOMY      INSERTION OF PACEMAKER Left 2014    KNEE ARTHROSCOPY      left shoulder      MENISCECTOMY, KNEE,  partial,MEDIAL,lateral Left 2019    Performed by [facility] at [facility] OR    SURGICAL REMOVAL OF BONE SPUR Left 2016    Removed from leftr shoulder     Family History   Problem Relation Age of Onset    No Known Problems Mother     No Known Problems Father      Social History     Tobacco Use    Smoking status: Former Smoker     Packs/day: 0.50     Years: 10.00     Pack years: 5.00     Start date:      Last attempt to quit:      Years since quittin.6    Smokeless tobacco: Never Used   Substance Use Topics    Alcohol use: Yes     Comment: occassional     Drug use: Not Currently     Review of Systems   Constitutional: Negative for chills, fatigue and fever.   HENT: Negative for congestion, postnasal drip, rhinorrhea, sinus pain and trouble swallowing.    Eyes: Negative for photophobia and visual disturbance.   Respiratory: Negative for chest tightness, shortness of breath and wheezing.    Cardiovascular: Negative for chest pain, palpitations and leg swelling.   Gastrointestinal: Positive for nausea. Negative for abdominal pain, blood in stool and vomiting.   Endocrine: Negative for polydipsia, polyphagia and polyuria.   Genitourinary: Negative for dysuria, flank pain, urgency, vaginal bleeding and vaginal discharge.   Musculoskeletal: Negative for back pain and gait problem.   Skin: Negative for rash.   Neurological: Positive for syncope and light-headedness. Negative for tremors, seizures, speech difficulty, weakness and numbness.   Hematological: Does not bruise/bleed easily.   Psychiatric/Behavioral: Negative for confusion.   All other systems reviewed and are negative.      Physical Exam     Initial Vitals [08/30/19 1200]   BP Pulse Resp Temp SpO2   121/77 75 16 97.9 °F (36.6 °C) 98 %      MAP       --         Physical Exam    Nursing note and vitals reviewed.  Constitutional: She appears well-developed and well-nourished.   HENT:   Head: Normocephalic and atraumatic.   Nose: Nose normal.   Mouth/Throat: Oropharynx is clear and moist.   Eyes: Conjunctivae and EOM are normal. Pupils are equal, round, and reactive to light.   Neck: Normal range of motion. Neck supple. No thyromegaly present. No tracheal deviation present.   Cardiovascular: Normal rate, regular rhythm, normal heart sounds and intact distal pulses. Exam reveals no gallop and no friction rub.    No murmur heard.  Pulmonary/Chest: Breath sounds normal. No stridor. No respiratory distress.   Abdominal: Soft. Bowel sounds are normal. She exhibits no mass. There is no rebound and no guarding.    Musculoskeletal: Normal range of motion. She exhibits no edema.   Lymphadenopathy:     She has no cervical adenopathy.   Neurological: She is alert and oriented to person, place, and time. She has normal strength and normal reflexes. GCS score is 15. GCS eye subscore is 4. GCS verbal subscore is 5. GCS motor subscore is 6.   Skin: Skin is warm and dry. Capillary refill takes less than 2 seconds.   Psychiatric: She has a normal mood and affect.         ED Course   Procedures  Labs Reviewed   CBC W/ AUTO DIFFERENTIAL - Abnormal; Notable for the following components:       Result Value    MPV 9.0 (*)     All other components within normal limits   COMPREHENSIVE METABOLIC PANEL - Abnormal; Notable for the following components:    Potassium 3.1 (*)     BUN, Bld 23 (*)     All other components within normal limits   TROPONIN I   TROPONIN I   B-TYPE NATRIURETIC PEPTIDE     EKG Readings: (Independently Interpreted)   Rhythm: Paced Rhythm. Ectopy: No Ectopy. Axis: Left Axis Deviation.   Atrial paced, 79, left axis, nonspecific T-wave changes in AVR, V1     ECG Results          EKG 12-lead (In process)  Result time 08/30/19 16:50:23    In process by Interface, Lab In [facility] (08/30/19 16:50:23)                 Narrative:    Test Reason : R07.9,    Vent. Rate : 079 BPM     Atrial Rate : 079 BPM     P-R Int : 230 ms          QRS Dur : 090 ms      QT Int : 392 ms       P-R-T Axes : 000 -27 047 degrees     QTc Int : 449 ms    Atrial-paced rhythm with prolonged AV conduction  Abnormal ECG  When compared with ECG of 16-AUG-2019 13:31,  No significant change was found    Referred By: [email or web address]   SELF           Confirmed By:                   In process by Interface, Lab In [facility] (08/30/19 15:50:30)                 Narrative:    Test Reason : R07.9,    Vent. Rate : 079 BPM     Atrial Rate : 079 BPM     P-R Int : 230 ms          QRS Dur : 090 ms      QT Int : 392 ms       P-R-T Axes : 000 -27 047 degrees     QTc Int : 449  ms    Atrial-paced rhythm with prolonged AV conduction  Abnormal ECG  When compared with ECG of 16-AUG-2019 13:31,  No significant change was found    Referred By: [email or web address]   SELF           Confirmed By:                   In process by Interface, Lab In [facility] (08/30/19 12:29:53)                 Narrative:    Test Reason : R07.9,    Vent. Rate : 079 BPM     Atrial Rate : 079 BPM     P-R Int : 230 ms          QRS Dur : 090 ms      QT Int : 392 ms       P-R-T Axes : 000 -27 047 degrees     QTc Int : 449 ms    Atrial-paced rhythm with prolonged AV conduction  Abnormal ECG  When compared with ECG of 16-AUG-2019 13:31,  No significant change was found    Referred By: [email or web address]   SELF           Confirmed By:                             Imaging Results          X-Ray Chest AP Portable (Final result)  Result time 08/30/19 12:54:20    Final result by Giancarlo Mcgill MD (08/30/19 12:54:20)                 Impression:      1. No acute process.      Electronically signed by: Giancarlo Mcgill MD  Date:    08/30/2019  Time:    12:54             Narrative:    EXAMINATION:  XR CHEST AP PORTABLE    CLINICAL HISTORY:  Chest pain    COMPARISON:  August 16, 2019    FINDINGS:  Heart size is normal.  The mediastinum is unremarkable.  Dual lead pacemaker device is unchanged in position.    There are no infiltrates or effusions.  No acute osseous abnormalities are identified.                               CT Head Without Contrast (Final result)  Result time 08/30/19 12:47:46    Final result by Giancarlo Mcgill MD (08/30/19 12:47:46)                 Impression:      1. Normal CT appearance of the brain.      Electronically signed by: Giancarlo Mcgill MD  Date:    08/30/2019  Time:    12:47             Narrative:    EXAMINATION:  CT HEAD WITHOUT CONTRAST    CLINICAL HISTORY:  Slurred speech, syncopal episode    TECHNIQUE:  CMS MANDATED QUALITY DATA - CT RADIATION - 436    All CT scans at this facility utilize dose modulation,  iterative reconstruction, and/or weight based dosing when appropriate to reduce radiation dose to as low as reasonably achievable.    Non infusion images were obtained from the skull base to the vertex.    COMPARISON:  None.    FINDINGS:  There is no evidence of intracranial mass, hemorrhage, or midline shift.  The ventricles and sulci are within normal limits.  There are no pathologic extra-axial fluid collections.    There is no evidence of ischemic change or edema.  Cerebellum and brainstem are unremarkable.    The calvarium is intact.                                 Medical Decision Making:   Initial Assessment:   66-year-old female with history of cardiac pacemaker the ([employer] device) patient presents with syncopal event.  Differential Diagnosis:   Cardiac arrhythmia, electrolyte abnormality, dehydration, volume depletion, vasovagal response, seizure  Clinical Tests:   Lab Tests: Ordered and Reviewed  Radiological Study: Ordered and Reviewed  Medical Tests: Ordered and Reviewed                   ED Course as of Aug 30 1736   Fri Aug 30, 2019   1213 54-year-old female, presents to the emergency department for evaluation of a syncopal episode.  Patient states while at work, she began to feel diaphoretic and uncomfortable.  A co-worker who is present with the patient in the emergency department however was not present at the time of the incident relates history from a no other co-worker who was present that the patient lean back in her chair head appeared to lose consciousness.  This lasted a brief but undefined period of time.  The patient was reported to have been slurring her words in prior to collapsing in the chair.  Her speech is now back at baseline however the patient still endorses some generalized feelings of being unwell.  She has a history of symptomatic bradycardia, she has a pacemaker which was placed by Dr. Lynn proximately 5 years ago.  Today she denies chest pain, shortness of breath.     [BF]      ED Course User Index  [BF] STAR Hathaway     Clinical Impression:       ICD-10-CM ICD-9-CM   1. Syncope, unspecified syncope type R55 780.2                                Jose Alfredo Torres MD  08/30/19 [id number]

## 2019-08-30 NOTE — ED TRIAGE NOTES
Pt states she had her knee scoped last Wednesday and has experienced some light-headedness but today while at work it was reported that she became unresponsive and was having some slurred speech and wasn't moving very much. Pt states she remembers not feeling well and then becoming diaphoretic prior to the episode but does not remember much else about it

## 2019-08-31 VITALS
WEIGHT: 195.56 LBS | TEMPERATURE: 98 F | DIASTOLIC BLOOD PRESSURE: 81 MMHG | OXYGEN SATURATION: 97 % | HEIGHT: 70 IN | RESPIRATION RATE: 17 BRPM | BODY MASS INDEX: 28 KG/M2 | SYSTOLIC BLOOD PRESSURE: 135 MMHG | HEART RATE: 62 BPM

## 2019-08-31 PROBLEM — I71.20 THORACIC AORTIC ANEURYSM WITHOUT RUPTURE: Chronic | Status: ACTIVE | Noted: 2019-08-31

## 2019-08-31 LAB
ANION GAP SERPL CALC-SCNC: 8 MMOL/L (ref 8–16)
BASOPHILS # BLD AUTO: 0.05 K/UL (ref 0–0.2)
BASOPHILS NFR BLD: 0.5 % (ref 0–1.9)
BUN SERPL-MCNC: 19 MG/DL (ref 6–20)
CALCIUM SERPL-MCNC: 8 MG/DL (ref 8.7–10.5)
CHLORIDE SERPL-SCNC: 105 MMOL/L (ref 95–110)
CO2 SERPL-SCNC: 22 MMOL/L (ref 23–29)
CREAT SERPL-MCNC: 0.5 MG/DL (ref 0.5–1.4)
DIFFERENTIAL METHOD: ABNORMAL
EOSINOPHIL # BLD AUTO: 0.2 K/UL (ref 0–0.5)
EOSINOPHIL NFR BLD: 1.9 % (ref 0–8)
ERYTHROCYTE [DISTWIDTH] IN BLOOD BY AUTOMATED COUNT: 12.6 % (ref 11.5–14.5)
EST. GFR  (AFRICAN AMERICAN): >60 ML/MIN/1.73 M^2
EST. GFR  (NON AFRICAN AMERICAN): >60 ML/MIN/1.73 M^2
GLUCOSE SERPL-MCNC: 98 MG/DL (ref 70–110)
HCT VFR BLD AUTO: 37.6 % (ref 37–48.5)
HGB BLD-MCNC: 12.5 G/DL (ref 12–16)
IMM GRANULOCYTES # BLD AUTO: 0.03 K/UL (ref 0–0.04)
IMM GRANULOCYTES NFR BLD AUTO: 0.3 % (ref 0–0.5)
LYMPHOCYTES # BLD AUTO: 3.8 K/UL (ref 1–4.8)
LYMPHOCYTES NFR BLD: 41.3 % (ref 18–48)
MAGNESIUM SERPL-MCNC: 1.8 MG/DL (ref 1.6–2.6)
MCH RBC QN AUTO: 29.6 PG (ref 27–31)
MCHC RBC AUTO-ENTMCNC: 33.2 G/DL (ref 32–36)
MCV RBC AUTO: 89 FL (ref 82–98)
MONOCYTES # BLD AUTO: 0.8 K/UL (ref 0.3–1)
MONOCYTES NFR BLD: 8.8 % (ref 4–15)
NEUTROPHILS # BLD AUTO: 4.3 K/UL (ref 1.8–7.7)
NEUTROPHILS NFR BLD: 47.2 % (ref 38–73)
NRBC BLD-RTO: 0 /100 WBC
PLATELET # BLD AUTO: 237 K/UL (ref 150–350)
PMV BLD AUTO: 8.8 FL (ref 9.2–12.9)
POTASSIUM SERPL-SCNC: 4.1 MMOL/L (ref 3.5–5.1)
RBC # BLD AUTO: 4.22 M/UL (ref 4–5.4)
SODIUM SERPL-SCNC: 135 MMOL/L (ref 136–145)
TROPONIN I SERPL DL<=0.01 NG/ML-MCNC: <0.03 NG/ML (ref 0.02–0.04)
WBC # BLD AUTO: 9.16 K/UL (ref 3.9–12.7)

## 2019-08-31 PROCEDURE — 25500020 PHARM REV CODE 255: Performed by: FAMILY MEDICINE

## 2019-08-31 PROCEDURE — 36415 COLL VENOUS BLD VENIPUNCTURE: CPT

## 2019-08-31 PROCEDURE — 85025 COMPLETE CBC W/AUTO DIFF WBC: CPT

## 2019-08-31 PROCEDURE — 84484 ASSAY OF TROPONIN QUANT: CPT

## 2019-08-31 PROCEDURE — G0378 HOSPITAL OBSERVATION PER HR: HCPCS

## 2019-08-31 PROCEDURE — 80048 BASIC METABOLIC PNL TOTAL CA: CPT

## 2019-08-31 PROCEDURE — 83735 ASSAY OF MAGNESIUM: CPT

## 2019-08-31 RX ORDER — METOPROLOL SUCCINATE 25 MG/1
12.5 TABLET, EXTENDED RELEASE ORAL NIGHTLY
Refills: 3
Start: 2019-08-31 | End: 2020-11-05

## 2019-08-31 RX ADMIN — IOHEXOL 100 ML: 350 INJECTION, SOLUTION INTRAVENOUS at 09:08

## 2019-08-31 NOTE — ED NOTES
Jaciel neuro deficits.  Oriented x 4.  Fall precautions.  Steady gait.  Family at BS to assist.  Call light in lap.

## 2019-08-31 NOTE — PLAN OF CARE
Pt's D-Dimer is 0.58, which is elevated by 0.03 when adjusted for age. Wells criteria 4.5. Will do bilat venous US and CTA chest.

## 2019-08-31 NOTE — CONSULTS
[facility]  Cardiology consult     Patient Name: Mariah Ashton  MRN: [id number]  Admission Date: 8/30/2019  Attending Physician: Dr. Cruz  Primary Care Provider: Julienne Damon NP           Patient information was obtained from patient and ER records.      Subjective:      Principal Problem:Syncope     Chief Complaint:       Chief Complaint   Patient presents with    Pre Syncope         HPI: Ms. Ashton presents today with complaints of a syncope. It is severe. She was sitting at her desk, felt hot and turned on a fan, then had tunnel vision, and subsequently lost consciousness for a brief moment. Her co-workers report no seizure activity. She denies dizziness, fever, chest pain, SOB, or N/V/D. She states this used to happen to her frequently prior to having a pacemaker placed 5 years ago.  She had 3 or 4 spells of syncope prior to PPM placement.  They all occur approximately 30 min after a meal  .  There was no associated vomiting but she did have nausea; no diarrhea or bowel moments.  Today, this happened after eating chick ammon a about 45 min prior. She had a Tilt table, stress test, and loop recorder 5 years ago and with the results of the loop recorder, it was determined she would need a pacemaker.  She had a spell approximately 2-3 hours after loop recorder was implanted 5 years ago.  She has a history of hypothyroidism. Last week she had a left knee scope and was prescribed percocet, but states she has not taken any of this.  She has not been on any NSAIDs; she denies hematochezia melena hematemesis; she had a normal bowel movement this morning.  The patient is not orthostatic.          Past Medical History:   Diagnosis Date    Bradycardia      Thyroid disease                 Past Surgical History:   Procedure Laterality Date    ARTHROSCOPY, KNEE Left 8/21/2019     [facility]    ATRIAL CARDIAC PACEMAKER INSERTION         HYSTERECTOMY   1999    INSERTION OF PACEMAKER Left 2014    KNEE ARTHROSCOPY        left shoulder        MENISCECTOMY, KNEE,  partial,MEDIAL,lateral Left 2019     Performed by [facility] at [facility] OR    SURGICAL REMOVAL OF BONE SPUR Left 2016     Removed from leftr shoulder         Review of patient's allergies indicates:  No Known Allergies     No current facility-administered medications on file prior to encounter.            Current Outpatient Medications on File Prior to Encounter   Medication Sig    ARMOUR THYROID 90 mg Tab TAKE 90MG BY MOUTH EVERY NIGHT AT BEDTIME    ascorbic acid, vitamin C, (VITAMIN C) 500 MG tablet Take 500 mg by mouth every evening.     estradiol (ESTRACE) 2 MG tablet Take 2 mg by mouth every evening.     KRILL OIL ORAL Take 1,000 mg by mouth every evening.     metoprolol succinate (TOPROL-XL) 25 MG 24 hr tablet Take 25 mg by mouth every evening.     multivitamin (THERAGRAN) per tablet Take 0.5 capsules by mouth every evening.     potassium gluconate 595 mg (99 mg) Tab Take 1 tablet by mouth every evening.    vitamin D (VITAMIN D3) 1000 units Tab Take 1,000 Units by mouth every evening.     oxyCODONE-acetaminophen (PERCOCET) 7.5-325 mg per tablet Take 1 tablet by mouth every 4 (four) hours as needed for Pain.    [DISCONTINUED] meclizine (ANTIVERT) 25 mg tablet Take 25 mg by mouth 3 (three) times daily as needed.    [DISCONTINUED] potassium chloride 10% (KAYCIEL) 20 mEq/15 mL oral solution Take 99 mEq by mouth once daily.            Family History      Problem Relation (Age of Onset)     Diabetes Mother, Maternal Grandmother, Maternal Grandfather     Heart disease Maternal Aunt     No Known Problems Father                Tobacco Use    Smoking status: Former Smoker       Packs/day: 0.50       Years: 10.00       Pack years: 5.00       Start date:        Last attempt to quit:        Years since quittin.6    Smokeless tobacco: Never Used   Substance  and Sexual Activity    Alcohol use: Yes       Comment: occassional    Drug use: Not Currently    Sexual activity: Not on file      Review of Systems   Constitutional: Negative for activity change, appetite change, chills, diaphoresis, fatigue, fever and unexpected weight change.   HENT: Negative for congestion, ear pain, facial swelling, hearing loss, sore throat and trouble swallowing.    Eyes: Negative for pain and discharge.   Respiratory: Negative for cough, chest tightness, shortness of breath and wheezing.    Cardiovascular: Negative for chest pain, palpitations and leg swelling.   Gastrointestinal: Negative for abdominal pain, blood in stool, diarrhea, nausea and vomiting.   Endocrine: Negative for polydipsia, polyphagia and polyuria.   Genitourinary: Negative for difficulty urinating, dysuria, flank pain, frequency and urgency.   Musculoskeletal: Negative for arthralgias, back pain, joint swelling, neck pain and neck stiffness.   Skin: Negative for rash and wound.   Allergic/Immunologic: Negative for environmental allergies and immunocompromised state.   Neurological: Positive for syncope and light-headedness. Negative for dizziness, seizures, speech difficulty, weakness, numbness and headaches.   Hematological: Negative for adenopathy.   Psychiatric/Behavioral: Negative for sleep disturbance and suicidal ideas. The patient is not nervous/anxious.    All other systems reviewed and are negative.     Objective:      Vital Signs (Most Recent):  Temp: 98.3 °F (36.8 °C) (08/30/19 2140)  Pulse: 66 (08/30/19 2140)  Resp: 17 (08/30/19 2140)  BP: (!) 144/89 (08/30/19 2140)  SpO2: (!) 92 % (08/30/19 2140) Vital Signs (24h Range):  Temp:  [97.9 °F (36.6 °C)-98.6 °F (37 °C)] 98.3 °F (36.8 °C)  Pulse:  [62-78] 66  Resp:  [16-30] 17  SpO2:  [92 %-100 %] 92 %  BP: (121-151)/(68-92) 144/89      Weight: 88.7 kg (195 lb 9.1 oz)  Body mass index is 28.47 kg/m².  Blood pressure and heart rate lying by 135/80 mm at 63 bpm   and standing 139/77 mm and 70 bpm-the patient was not dizzy     Physical Exam   Constitutional: She is oriented to person, place, and time. She appears well-developed and well-nourished.   HENT:   Head: Normocephalic and atraumatic.   Eyes: Pupils are equal, round, and reactive to light. EOM are normal.   Neck: Normal range of motion. Neck supple.   Cardiovascular: Normal rate, regular rhythm, normal heart sounds and intact distal pulses.   No murmur heard.  Pulmonary/Chest: Effort normal and breath sounds normal. No stridor. No respiratory distress. She has no wheezes.   Abdominal: Soft. Bowel sounds are normal. She exhibits no distension. There is no tenderness.   Genitourinary: Vagina normal.   Musculoskeletal: Normal range of motion. She exhibits edema.   Bilat LE edema 1+, left worse than right, but she states the left has always been slightly more swollen d/t an old ankle injury   Neurological: She is alert and oriented to person, place, and time.   Skin: Skin is warm and dry. Capillary refill takes less than 2 seconds.   Bandage to left knee, no erythema or drainage   Psychiatric: She has a normal mood and affect.   Nursing note and vitals reviewed.           CRANIAL NERVES      CN III, IV, VI   Pupils are equal, round, and reactive to light.  Extraocular motions are normal.         Significant Labs:   CBC:       Recent Labs   Lab 08/30/19  1225   WBC 10.28   HGB 14.5   HCT 43.9         CMP:       Recent Labs   Lab 08/30/19  1225      K 3.1*      CO2 24   GLU 85   BUN 23*   CREATININE 0.7   CALCIUM 9.0   PROT 7.1   ALBUMIN 3.6   BILITOT 0.6   ALKPHOS 68   AST 21   ALT 14   ANIONGAP 15   EGFRNONAA >60.0      Troponin:        Recent Labs   Lab 08/30/19  1225 08/30/19  1623   TROPONINI <0.030 <0.030         Significant Imaging: I have reviewed all pertinent imaging results/findings within the past 24 hours.         Ct Head Without Contrast     Result Date: 8/30/2019  EXAMINATION: CT HEAD  WITHOUT CONTRAST CLINICAL HISTORY: Slurred speech, syncopal episode TECHNIQUE: CMS MANDATED QUALITY DATA - CT RADIATION - 436 All CT scans at this facility utilize dose modulation, iterative reconstruction, and/or weight based dosing when appropriate to reduce radiation dose to as low as reasonably achievable. Non infusion images were obtained from the skull base to the vertex. COMPARISON: None. FINDINGS: There is no evidence of intracranial mass, hemorrhage, or midline shift.  The ventricles and sulci are within normal limits.  There are no pathologic extra-axial fluid collections. There is no evidence of ischemic change or edema.  Cerebellum and brainstem are unremarkable. The calvarium is intact.      1. Normal CT appearance of the brain. Electronically signed by:     Giancarlo Mcglil MD Date:                                            08/30/2019 Time:                                            12:47     X-ray Chest Ap Portable     Result Date: 8/30/2019  EXAMINATION: XR CHEST AP PORTABLE CLINICAL HISTORY: Chest pain COMPARISON: August 16, 2019 FINDINGS: Heart size is normal.  The mediastinum is unremarkable.  Dual lead pacemaker device is unchanged in position. There are no infiltrates or effusions.  No acute osseous abnormalities are identified.      1. No acute process. Electronically signed by:          Giancarlo Mcgill MD Date:                                     08/30/2019 Time:                                            12:54   Carotid ultrasound did not reveal any significant stenosis.  No DVT on bilateral lower extremity ultrasound     Assessment/Plan:      * Syncope  Admit to cardiology B  Interrogate pacemaker - this was done by 2130 and showed NO malfunctioning   Trend troponins - first set negative   Check D-dimer with recent ortho surgery   CT head negative in ED   Consider Postprandial hypotension/hypoglycemia as this frequently happens after she eats   Possible vasovagal syncope-the pacemaker  addresses the [place] part of the reflex but not the hypotension.     Hypokalemia  Replaced in ED  Repeat BMP then electrolyte replacement per SS.    Left knee arthroscopy -1 week ago.     Wean off beta blocker.  Beta-blocker may be blunting the reflex tachycardia that one normally sees when blood pressure drops.  Follow-up with Dr. Lynn   in 2 weeks.  The patient may be ambulated and discharged.         VTE Risk Mitigation (From admission, onward)         Ordered       enoxaparin injection 40 mg  Daily      08/30/19 2116       IP VTE HIGH RISK PATIENT  Once      08/30/19 2116                FLACO Ferrari MD [facility]  Cardiology  [facility]      Cosigned by: Naomi Cruz MD at 8/31/2019 12:38 AM   Revision History

## 2019-08-31 NOTE — PLAN OF CARE
Problem: Adult Inpatient Plan of Care  Goal: Absence of Hospital-Acquired Illness or Injury  Patient is free of injury during the shift

## 2019-08-31 NOTE — ED NOTES
Spoke with [employer] rep Phyllis [phone] post pacer interrogation and data transfer. Rep pending report and agrees to call MD with follow up.

## 2019-08-31 NOTE — HPI
Ms. Ashton presents today with complaints of a syncope. It is severe. She was sitting at her desk, felt hot and turned on a fan, then had tunnel vision, and subsequently lost consciousness for a brief moment. Her co-workers report no seizure activity. She denies dizziness, fever, chest pain, SOB, or N/V/D. She states this used to happen to her frequently prior to having a pacemaker placed. She states she would have these episodes usually 30 min to an hour after eating. Today, this happened after eating chick ammon a about 45 min prior. She had a Tilt table, stress test, and loop recorder 5 years ago and with the results of the loop recorder, it was determined she would need a pacemaker. She has a history of hypothyroidism. Last week she had a left knee scope and was prescribed percocet, but states she has not taken any of this.

## 2019-08-31 NOTE — ASSESSMENT & PLAN NOTE
Admit to cardiology B  Interrogate pacemaker - this was done by 2130 and showed NO malfunctioning   Trend troponins - first set negative  Get carotid doppler   Check D-dimer with recent ortho surgery   CT head negative in ED   Consider Postprandial hypotension/hypoglycemia as this frequently happens after she eats

## 2019-08-31 NOTE — H&P
[facility] Medicine  History & Physical    Patient Name: Mariah Ashton  MRN: [id number]  Admission Date: 8/30/2019  Attending Physician: Dr. Cruz  Primary Care Provider: Julienne Damon NP         Patient information was obtained from patient and ER records.     Subjective:     Principal Problem:Syncope    Chief Complaint:   Chief Complaint   Patient presents with    Pre Syncope        HPI: Ms. Ashton presents today with complaints of a syncope. It is severe. She was sitting at her desk, felt hot and turned on a fan, then had tunnel vision, and subsequently lost consciousness for a brief moment. Her co-workers report no seizure activity. She denies dizziness, fever, chest pain, SOB, or N/V/D. She states this used to happen to her frequently prior to having a pacemaker placed. She states she would have these episodes usually 30 min to an hour after eating. Today, this happened after eating chick ammon a about 45 min prior. She had a Tilt table, stress test, and loop recorder 5 years ago and with the results of the loop recorder, it was determined she would need a pacemaker. She has a history of hypothyroidism. Last week she had a left knee scope and was prescribed percocet, but states she has not taken any of this.    Past Medical History:   Diagnosis Date    Bradycardia     Thyroid disease        Past Surgical History:   Procedure Laterality Date    ARTHROSCOPY, KNEE Left 8/21/2019    Per[facility] OR    ATRIAL CARDIAC PACEMAKER INSERTION      HYSTERECTOMY  1999    INSERTION OF PACEMAKER Left 12/2014    KNEE ARTHROSCOPY      left shoulder      MENISCECTOMY, KNEE,  partial,MEDIAL,lateral Left 8/21/2019    Performed by [facility] at [facility] OR    SURGICAL REMOVAL OF BONE SPUR Left 2016    Removed from leftr shoulder       Review of patient's allergies indicates:  No Known Allergies    No current facility-administered medications on file prior to encounter.       Current Outpatient Medications on File Prior to Encounter   Medication Sig    ARMOUR THYROID 90 mg Tab TAKE 90MG BY MOUTH EVERY NIGHT AT BEDTIME    ascorbic acid, vitamin C, (VITAMIN C) 500 MG tablet Take 500 mg by mouth every evening.     estradiol (ESTRACE) 2 MG tablet Take 2 mg by mouth every evening.     KRILL OIL ORAL Take 1,000 mg by mouth every evening.     metoprolol succinate (TOPROL-XL) 25 MG 24 hr tablet Take 25 mg by mouth every evening.     multivitamin (THERAGRAN) per tablet Take 0.5 capsules by mouth every evening.     potassium gluconate 595 mg (99 mg) Tab Take 1 tablet by mouth every evening.    vitamin D (VITAMIN D3) 1000 units Tab Take 1,000 Units by mouth every evening.     oxyCODONE-acetaminophen (PERCOCET) 7.5-325 mg per tablet Take 1 tablet by mouth every 4 (four) hours as needed for Pain.    [DISCONTINUED] meclizine (ANTIVERT) 25 mg tablet Take 25 mg by mouth 3 (three) times daily as needed.    [DISCONTINUED] potassium chloride 10% (KAYCIEL) 20 mEq/15 mL oral solution Take 99 mEq by mouth once daily.      Family History     Problem Relation (Age of Onset)    Diabetes Mother, Maternal Grandmother, Maternal Grandfather    Heart disease Maternal Aunt    No Known Problems Father        Tobacco Use    Smoking status: Former Smoker     Packs/day: 0.50     Years: 10.00     Pack years: 5.00     Start date:      Last attempt to quit:      Years since quittin.6    Smokeless tobacco: Never Used   Substance and Sexual Activity    Alcohol use: Yes     Comment: occassional    Drug use: Not Currently    Sexual activity: Not on file     Review of Systems   Constitutional: Negative for activity change, appetite change, chills, diaphoresis, fatigue, fever and unexpected weight change.   HENT: Negative for congestion, ear pain, facial swelling, hearing loss, sore throat and trouble swallowing.    Eyes: Negative for pain and discharge.   Respiratory: Negative for cough, chest  tightness, shortness of breath and wheezing.    Cardiovascular: Negative for chest pain, palpitations and leg swelling.   Gastrointestinal: Negative for abdominal pain, blood in stool, diarrhea, nausea and vomiting.   Endocrine: Negative for polydipsia, polyphagia and polyuria.   Genitourinary: Negative for difficulty urinating, dysuria, flank pain, frequency and urgency.   Musculoskeletal: Negative for arthralgias, back pain, joint swelling, neck pain and neck stiffness.   Skin: Negative for rash and wound.   Allergic/Immunologic: Negative for environmental allergies and immunocompromised state.   Neurological: Positive for syncope and light-headedness. Negative for dizziness, seizures, speech difficulty, weakness, numbness and headaches.   Hematological: Negative for adenopathy.   Psychiatric/Behavioral: Negative for sleep disturbance and suicidal ideas. The patient is not nervous/anxious.    All other systems reviewed and are negative.    Objective:     Vital Signs (Most Recent):  Temp: 98.3 °F (36.8 °C) (08/30/19 2140)  Pulse: 66 (08/30/19 2140)  Resp: 17 (08/30/19 2140)  BP: (!) 144/89 (08/30/19 2140)  SpO2: (!) 92 % (08/30/19 2140) Vital Signs (24h Range):  Temp:  [97.9 °F (36.6 °C)-98.6 °F (37 °C)] 98.3 °F (36.8 °C)  Pulse:  [62-78] 66  Resp:  [16-30] 17  SpO2:  [92 %-100 %] 92 %  BP: (121-151)/(68-92) 144/89     Weight: 88.7 kg (195 lb 9.1 oz)  Body mass index is 28.47 kg/m².    Physical Exam   Constitutional: She is oriented to person, place, and time. She appears well-developed and well-nourished.   HENT:   Head: Normocephalic and atraumatic.   Eyes: Pupils are equal, round, and reactive to light. EOM are normal.   Neck: Normal range of motion. Neck supple.   Cardiovascular: Normal rate, regular rhythm, normal heart sounds and intact distal pulses.   No murmur heard.  Pulmonary/Chest: Effort normal and breath sounds normal. No stridor. No respiratory distress. She has no wheezes.   Abdominal: Soft. Bowel  sounds are normal. She exhibits no distension. There is no tenderness.   Genitourinary: Vagina normal.   Musculoskeletal: Normal range of motion. She exhibits edema.   Bilat LE edema 1+, left worse than right, but she states the left has always been slightly more swollen d/t an old ankle injury   Neurological: She is alert and oriented to person, place, and time.   Skin: Skin is warm and dry. Capillary refill takes less than 2 seconds.   Bandage to left knee, no erythema or drainage   Psychiatric: She has a normal mood and affect.   Nursing note and vitals reviewed.        CRANIAL NERVES     CN III, IV, VI   Pupils are equal, round, and reactive to light.  Extraocular motions are normal.        Significant Labs:   CBC:   Recent Labs   Lab 08/30/19  1225   WBC 10.28   HGB 14.5   HCT 43.9        CMP:   Recent Labs   Lab 08/30/19  1225      K 3.1*      CO2 24   GLU 85   BUN 23*   CREATININE 0.7   CALCIUM 9.0   PROT 7.1   ALBUMIN 3.6   BILITOT 0.6   ALKPHOS 68   AST 21   ALT 14   ANIONGAP 15   EGFRNONAA >60.0     Troponin:   Recent Labs   Lab 08/30/19  1225 08/30/19  1623   TROPONINI <0.030 <0.030       Significant Imaging: I have reviewed all pertinent imaging results/findings within the past 24 hours.       Ct Head Without Contrast    Result Date: 8/30/2019  EXAMINATION: CT HEAD WITHOUT CONTRAST CLINICAL HISTORY: Slurred speech, syncopal episode TECHNIQUE: CMS MANDATED QUALITY DATA - CT RADIATION - 436 All CT scans at this facility utilize dose modulation, iterative reconstruction, and/or weight based dosing when appropriate to reduce radiation dose to as low as reasonably achievable. Non infusion images were obtained from the skull base to the vertex. COMPARISON: None. FINDINGS: There is no evidence of intracranial mass, hemorrhage, or midline shift.  The ventricles and sulci are within normal limits.  There are no pathologic extra-axial fluid collections. There is no evidence of ischemic change or  edema.  Cerebellum and brainstem are unremarkable. The calvarium is intact.     1. Normal CT appearance of the brain. Electronically signed by: Giancarlo Mcgill MD Date:    08/30/2019 Time:    12:47    X-ray Chest Ap Portable    Result Date: 8/30/2019  EXAMINATION: XR CHEST AP PORTABLE CLINICAL HISTORY: Chest pain COMPARISON: August 16, 2019 FINDINGS: Heart size is normal.  The mediastinum is unremarkable.  Dual lead pacemaker device is unchanged in position. There are no infiltrates or effusions.  No acute osseous abnormalities are identified.     1. No acute process. Electronically signed by: Giancarlo Mcgill MD Date:    08/30/2019 Time:    12:54      Assessment/Plan:     * Syncope  Admit to cardiology B  Interrogate pacemaker - this was done by 2130 and showed NO malfunctioning   Trend troponins - first set negative  Get carotid doppler   Check D-dimer with recent ortho surgery   CT head negative in ED   Consider Postprandial hypotension/hypoglycemia as this frequently happens after she eats      Hypokalemia  Replaced in ED  Repeat BMP then electrolyte replacement per SS      VTE Risk Mitigation (From admission, onward)        Ordered     enoxaparin injection 40 mg  Daily      08/30/19 2116     IP VTE HIGH RISK PATIENT  Once      08/30/19 2116             Mirta Álvarez NP  Department of Hospital Medicine   [facility]

## 2019-08-31 NOTE — SUBJECTIVE & OBJECTIVE
Past Medical History:   Diagnosis Date    Bradycardia     Thyroid disease        Past Surgical History:   Procedure Laterality Date    ARTHROSCOPY, KNEE Left 8/21/2019    Performed by [facility]uentes [facility] at [facility] OR    ATRIAL CARDIAC PACEMAKER INSERTION      HYSTERECTOMY  1999    INSERTION OF PACEMAKER Left 12/2014    KNEE ARTHROSCOPY      left shoulder      MENISCECTOMY, KNEE,  partial,MEDIAL,lateral Left 8/21/2019    Per[facility] by [facility]uent[facility] at [facility] OR    SURGICAL REMOVAL OF BONE SPUR Left 2016    Removed from leftr shoulder       Review of patient's allergies indicates:  No Known Allergies    No current facility-administered medications on file prior to encounter.      Current Outpatient Medications on File Prior to Encounter   Medication Sig    ARMOUR THYROID 90 mg Tab TAKE 90MG BY MOUTH EVERY NIGHT AT BEDTIME    ascorbic acid, vitamin C, (VITAMIN C) 500 MG tablet Take 500 mg by mouth every evening.     estradiol (ESTRACE) 2 MG tablet Take 2 mg by mouth every evening.     KRILL OIL ORAL Take 1,000 mg by mouth every evening.     metoprolol succinate (TOPROL-XL) 25 MG 24 hr tablet Take 25 mg by mouth every evening.     multivitamin (THERAGRAN) per tablet Take 0.5 capsules by mouth every evening.     potassium gluconate 595 mg (99 mg) Tab Take 1 tablet by mouth every evening.    vitamin D (VITAMIN D3) 1000 units Tab Take 1,000 Units by mouth every evening.     oxyCODONE-acetaminophen (PERCOCET) 7.5-325 mg per tablet Take 1 tablet by mouth every 4 (four) hours as needed for Pain.    [DISCONTINUED] meclizine (ANTIVERT) 25 mg tablet Take 25 mg by mouth 3 (three) times daily as needed.    [DISCONTINUED] potassium chloride 10% (KAYCIEL) 20 mEq/15 mL oral solution Take 99 mEq by mouth once daily.      Family History     Problem Relation (Age of Onset)    Diabetes Mother, Maternal Grandmother, Maternal Grandfather    Heart disease Maternal Aunt    No Known Problems Father        Tobacco Use     Smoking status: Former Smoker     Packs/day: 0.50     Years: 10.00     Pack years: 5.00     Start date:      Last attempt to quit:      Years since quittin.6    Smokeless tobacco: Never Used   Substance and Sexual Activity    Alcohol use: Yes     Comment: occassional    Drug use: Not Currently    Sexual activity: Not on file     Review of Systems   Constitutional: Negative for activity change, appetite change, chills, diaphoresis, fatigue, fever and unexpected weight change.   HENT: Negative for congestion, ear pain, facial swelling, hearing loss, sore throat and trouble swallowing.    Eyes: Negative for pain and discharge.   Respiratory: Negative for cough, chest tightness, shortness of breath and wheezing.    Cardiovascular: Negative for chest pain, palpitations and leg swelling.   Gastrointestinal: Negative for abdominal pain, blood in stool, diarrhea, nausea and vomiting.   Endocrine: Negative for polydipsia, polyphagia and polyuria.   Genitourinary: Negative for difficulty urinating, dysuria, flank pain, frequency and urgency.   Musculoskeletal: Negative for arthralgias, back pain, joint swelling, neck pain and neck stiffness.   Skin: Negative for rash and wound.   Allergic/Immunologic: Negative for environmental allergies and immunocompromised state.   Neurological: Positive for syncope and light-headedness. Negative for dizziness, seizures, speech difficulty, weakness, numbness and headaches.   Hematological: Negative for adenopathy.   Psychiatric/Behavioral: Negative for sleep disturbance and suicidal ideas. The patient is not nervous/anxious.    All other systems reviewed and are negative.    Objective:     Vital Signs (Most Recent):  Temp: 98.3 °F (36.8 °C) (19)  Pulse: 66 (19)  Resp: 17 (19)  BP: (!) 144/89 (19)  SpO2: (!) 92 % (19) Vital Signs (24h Range):  Temp:  [97.9 °F (36.6 °C)-98.6 °F (37 °C)] 98.3 °F (36.8 °C)  Pulse:  [62-78]  66  Resp:  [16-30] 17  SpO2:  [92 %-100 %] 92 %  BP: (121-151)/(68-92) 144/89     Weight: 88.7 kg (195 lb 9.1 oz)  Body mass index is 28.47 kg/m².    Physical Exam   Constitutional: She is oriented to person, place, and time. She appears well-developed and well-nourished.   HENT:   Head: Normocephalic and atraumatic.   Eyes: Pupils are equal, round, and reactive to light. EOM are normal.   Neck: Normal range of motion. Neck supple.   Cardiovascular: Normal rate, regular rhythm, normal heart sounds and intact distal pulses.   No murmur heard.  Pulmonary/Chest: Effort normal and breath sounds normal. No stridor. No respiratory distress. She has no wheezes.   Abdominal: Soft. Bowel sounds are normal. She exhibits no distension. There is no tenderness.   Genitourinary: Vagina normal.   Musculoskeletal: Normal range of motion. She exhibits edema.   Bilat LE edema 1+, left worse than right, but she states the left has always been slightly more swollen d/t an old ankle injury   Neurological: She is alert and oriented to person, place, and time.   Skin: Skin is warm and dry. Capillary refill takes less than 2 seconds.   Bandage to left knee, no erythema or drainage   Psychiatric: She has a normal mood and affect.   Nursing note and vitals reviewed.        CRANIAL NERVES     CN III, IV, VI   Pupils are equal, round, and reactive to light.  Extraocular motions are normal.        Significant Labs:   CBC:   Recent Labs   Lab 08/30/19  1225   WBC 10.28   HGB 14.5   HCT 43.9        CMP:   Recent Labs   Lab 08/30/19  1225      K 3.1*      CO2 24   GLU 85   BUN 23*   CREATININE 0.7   CALCIUM 9.0   PROT 7.1   ALBUMIN 3.6   BILITOT 0.6   ALKPHOS 68   AST 21   ALT 14   ANIONGAP 15   EGFRNONAA >60.0     Troponin:   Recent Labs   Lab 08/30/19  1225 08/30/19  1623   TROPONINI <0.030 <0.030       Significant Imaging: I have reviewed all pertinent imaging results/findings within the past 24 hours.       Ct Head Without  Contrast    Result Date: 8/30/2019  EXAMINATION: CT HEAD WITHOUT CONTRAST CLINICAL HISTORY: Slurred speech, syncopal episode TECHNIQUE: CMS MANDATED QUALITY DATA - CT RADIATION - 436 All CT scans at this facility utilize dose modulation, iterative reconstruction, and/or weight based dosing when appropriate to reduce radiation dose to as low as reasonably achievable. Non infusion images were obtained from the skull base to the vertex. COMPARISON: None. FINDINGS: There is no evidence of intracranial mass, hemorrhage, or midline shift.  The ventricles and sulci are within normal limits.  There are no pathologic extra-axial fluid collections. There is no evidence of ischemic change or edema.  Cerebellum and brainstem are unremarkable. The calvarium is intact.     1. Normal CT appearance of the brain. Electronically signed by: Giancarlo Mcgill MD Date:    08/30/2019 Time:    12:47    X-ray Chest Ap Portable    Result Date: 8/30/2019  EXAMINATION: XR CHEST AP PORTABLE CLINICAL HISTORY: Chest pain COMPARISON: August 16, 2019 FINDINGS: Heart size is normal.  The mediastinum is unremarkable.  Dual lead pacemaker device is unchanged in position. There are no infiltrates or effusions.  No acute osseous abnormalities are identified.     1. No acute process. Electronically signed by: Giancarlo Mcgill MD Date:    08/30/2019 Time:    12:54

## 2019-08-31 NOTE — ED NOTES
Patient identifiers for Mariah Ashton checked and correct.  LOC:  Mariah Ashton is awake, alert, and aware of environment with an appropriate affect. She is oriented x 3 and speaking appropriately.  APPEARANCE:  She is resting comfortably and in no acute distress. She is clean and well groomed, patient's clothing is properly fastened.  SKIN:  The skin is warm and dry. She has normal skin turgor and moist mucus membranes. Skin is intact; no bruising or breakdown noted.  MUSCULOSKELETAL:  She is moving all extremities well, no obvious deformities noted. Pulses intact.   RESPIRATORY:  Airway is open and patent. Respirations are spontaneous and non-labored with normal effort and rate.  CARDIAC:  She has a normal rate and rhythm. No peripheral edema noted. Capillary refill < 3 seconds.  ABDOMEN:  No distention noted.  Soft and non-tender upon palpation.  NEUROLOGICAL:  PERRL. Facial expression is symmetrical. Hand grasps are equal bilaterally. Normal sensation in all extremities when touched with finger.  Allergies reported:  Review of patient's allergies indicates:  No Known Allergies  OTHER NOTES:

## 2019-09-01 NOTE — DISCHARGE SUMMARY
Formerly [facility] Medicine  Discharge Summary      Patient Name: Mariah Ashton  MRN: [id number]  Admission Date: 8/30/2019  Hospital Length of Stay: 0 days  Discharge Date and Time:  08/31/2019 8:27 PM  Attending Physician: No att. providers found   Discharging Provider: Jed Allen MD  Primary Care Provider: Julienne Damon NP      HPI:   Ms. Ashton presents today with complaints of a syncope. It is severe. She was sitting at her desk, felt hot and turned on a fan, then had tunnel vision, and subsequently lost consciousness for a brief moment. Her co-workers report no seizure activity. She denies dizziness, fever, chest pain, SOB, or N/V/D. She states this used to happen to her frequently prior to having a pacemaker placed. She states she would have these episodes usually 30 min to an hour after eating. Today, this happened after eating chick ammon a about 45 min prior. She had a Tilt table, stress test, and loop recorder 5 years ago and with the results of the loop recorder, it was determined she would need a pacemaker. She has a history of hypothyroidism. Last week she had a left knee scope and was prescribed percocet, but states she has not taken any of this.    * No surgery found *      Hospital Course:   54-year-old female history of syncope s/p pacemaker comes in for syncope. Patient had a history of syncope 5 years ago and had a extensive workup with cardiology including tilt table, stress test, and a loop recorder. Patient symptoms resolved after having a pacemaker placed by Dr. Lynn. Pacemaker was interrogated and found no malfunctions. Carotid doppler and head CT was unremarkable. Patient was seen by cardiology and had adjustments to patient's medications. Patient was without symptoms during hospitalization. Patient was stable at discharge with instructions to follow-up with cardiology.    General: Patient resting comfortably in no acute distress. Appears as stated age. Calm  Eyes: EOM  intact. No conjunctivae injection. No scleral icterus.  ENT: Hearing grossly intact. No discharge from ears. No nasal discharge.   CVS: RRR. No LE edema BL.  Lungs: CTA BL, no wheezing or crackles. Good breath sounds. No accessory muscle use. No acute respiratory distress  Neuro: AOx3. GCS 15. Cranial nerves grossly intact. Moves all extremities equally. Follows commands. Responds appropriately     Consults:   Consults (From admission, onward)        Status Ordering Provider     Inpatient consult to Cardiology  Once     Provider:  Jim Lynn MD    Acknowledged JOHN PAUL THIBODEAUX          No new Assessment & Plan notes have been filed under this hospital service since the last note was generated.  Service: Hospital Medicine    Final Active Diagnoses:    Diagnosis Date Noted POA    PRINCIPAL PROBLEM:  Syncope [R55] 08/30/2019 Yes    Thoracic aortic aneurysm without rupture [I71.2] 08/31/2019 Yes     Chronic    Hypokalemia [E87.6] 08/30/2019 Yes      Problems Resolved During this Admission:       Discharged Condition: stable    Disposition: Home or Self Care    Follow Up:  Follow-up Information     Jim Lynn MD In 1 week.    Specialty:  Cardiology  Why:  For check up s/p hospital discharge  Contact information:  [address] [facility] [address]  [phone]                 Patient Instructions:      Diet Cardiac     Notify your health care provider if you experience any of the following:  temperature >100.4     Notify your health care provider if you experience any of the following:  persistent nausea and vomiting or diarrhea     Notify your health care provider if you experience any of the following:  severe uncontrolled pain     Notify your health care provider if you experience any of the following:  redness, tenderness, or signs of infection (pain, swelling, redness, odor or green/yellow discharge around incision site)     Notify your health care provider if you experience any of the  following:  difficulty breathing or increased cough     Notify your health care provider if you experience any of the following:  severe persistent headache     Notify your health care provider if you experience any of the following:  worsening rash     Notify your health care provider if you experience any of the following:  persistent dizziness, light-headedness, or visual disturbances     Notify your health care provider if you experience any of the following:  increased confusion or weakness     Activity as tolerated       Significant Diagnostic Studies: Labs:   CMP   Recent Labs   Lab 08/30/19  1225 08/31/19  0232    135*   K 3.1* 4.1    105   CO2 24 22*   GLU 85 98   BUN 23* 19   CREATININE 0.7 0.5   CALCIUM 9.0 8.0*   PROT 7.1  --    ALBUMIN 3.6  --    BILITOT 0.6  --    ALKPHOS 68  --    AST 21  --    ALT 14  --    ANIONGAP 15 8   ESTGFRAFRICA >60.0 >60.0   EGFRNONAA >60.0 >60.0   , CBC   Recent Labs   Lab 08/30/19  1225 08/31/19  0440   WBC 10.28 9.16   HGB 14.5 12.5   HCT 43.9 37.6    237    and Troponin   Recent Labs   Lab 08/31/19 0232   TROPONINI <0.030       Pending Diagnostic Studies:     Procedure Component Value Units Date/Time    Troponin I [[id number]] Collected:  08/31/19 0233    Order Status:  Sent Lab Status:  No result     Specimen:  Blood          Medications:  Reconciled Home Medications:      Medication List      CHANGE how you take these medications    metoprolol succinate 25 MG 24 hr tablet  Commonly known as:  TOPROL-XL  Take 0.5 tablets (12.5 mg total) by mouth every evening.  What changed:  how much to take        CONTINUE taking these medications    ARMOUR THYROID 90 mg Tab  Generic drug:  thyroid (pork)  TAKE 90MG BY MOUTH EVERY NIGHT AT BEDTIME     estradiol 2 MG tablet  Commonly known as:  ESTRACE  Take 2 mg by mouth every evening.     KRILL OIL ORAL  Take 1,000 mg by mouth every evening.     multivitamin per tablet  Commonly known as:  THERAGRAN  Take 0.5  capsules by mouth every evening.     oxyCODONE-acetaminophen 7.5-325 mg per tablet  Commonly known as:  PERCOCET  Take 1 tablet by mouth every 4 (four) hours as needed for Pain.     potassium gluconate 595 mg (99 mg) Tab  Take 1 tablet by mouth every evening.     VITAMIN C 500 MG tablet  Generic drug:  ascorbic acid (vitamin C)  Take 500 mg by mouth every evening.     vitamin D 1000 units Tab  Commonly known as:  VITAMIN D3  Take 1,000 Units by mouth every evening.            Indwelling Lines/Drains at time of discharge:   Lines/Drains/Airways          None          Time spent on the discharge of patient: 25 minutes  Patient was seen and examined on the date of discharge and determined to be suitable for discharge.         Jed Allen MD  Department of Hospital Medicine  Formerly [facility]

## 2019-09-01 NOTE — HOSPITAL COURSE
54-year-old female history of syncope s/p pacemaker comes in for syncope. Patient had a history of syncope 5 years ago and had a extensive workup with cardiology including tilt table, stress test, and a loop recorder. Patient symptoms resolved after having a pacemaker placed by Dr. Lynn. Pacemaker was interrogated and found no malfunctions. Carotid doppler and head CT was unremarkable. Patient was seen by cardiology and had adjustments to patient's medications. Patient was without symptoms during hospitalization. Patient was stable at discharge with instructions to follow-up with cardiology.    General: Patient resting comfortably in no acute distress. Appears as stated age. Calm  Eyes: EOM intact. No conjunctivae injection. No scleral icterus.  ENT: Hearing grossly intact. No discharge from ears. No nasal discharge.   CVS: RRR. No LE edema BL.  Lungs: CTA BL, no wheezing or crackles. Good breath sounds. No accessory muscle use. No acute respiratory distress  Neuro: AOx3. GCS 15. Cranial nerves grossly intact. Moves all extremities equally. Follows commands. Responds appropriately

## 2019-09-04 ENCOUNTER — TELEPHONE (OUTPATIENT)
Dept: FAMILY MEDICINE | Facility: CLINIC | Age: 54
End: 2019-09-04

## 2019-09-09 ENCOUNTER — TELEPHONE (OUTPATIENT)
Dept: FAMILY MEDICINE | Facility: CLINIC | Age: 54
End: 2019-09-09

## 2019-09-09 DIAGNOSIS — E03.9 PRIMARY HYPOTHYROIDISM: ICD-10-CM

## 2019-09-09 DIAGNOSIS — E78.5 HYPERLIPIDEMIA, UNSPECIFIED HYPERLIPIDEMIA TYPE: ICD-10-CM

## 2019-09-09 DIAGNOSIS — E78.1 PURE HYPERGLYCERIDEMIA: ICD-10-CM

## 2019-09-09 DIAGNOSIS — Z79.899 ENCOUNTER FOR LONG-TERM (CURRENT) USE OF MEDICATIONS: Primary | ICD-10-CM

## 2019-09-09 NOTE — TELEPHONE ENCOUNTER
----- Message from Jane Roldan sent at 9/9/2019 11:50 AM CDT -----  Pt would like Julienne to give her a call back regarding lab work- nothing was sent over to [facility] and wanted her to know    [phone]

## 2019-09-16 ENCOUNTER — TELEPHONE (OUTPATIENT)
Dept: FAMILY MEDICINE | Facility: CLINIC | Age: 54
End: 2019-09-16

## 2019-09-16 DIAGNOSIS — E78.5 HYPERLIPIDEMIA, UNSPECIFIED HYPERLIPIDEMIA TYPE: ICD-10-CM

## 2019-09-16 DIAGNOSIS — E03.9 PRIMARY HYPOTHYROIDISM: ICD-10-CM

## 2019-09-16 DIAGNOSIS — Z79.899 ENCOUNTER FOR LONG-TERM (CURRENT) USE OF MEDICATIONS: Primary | ICD-10-CM

## 2019-09-16 DIAGNOSIS — E78.1 PURE HYPERGLYCERIDEMIA: ICD-10-CM

## 2019-09-16 NOTE — TELEPHONE ENCOUNTER
----- Message from Jacquelin Triana sent at 9/16/2019  2:59 PM CDT -----  Contact: Mariah Ashton  Pt needs Julienne to give her a call back this evening please . Pt# [phone]

## 2019-09-16 NOTE — TELEPHONE ENCOUNTER
Spoke with pt, states she has went to [facility] two Saturdays in a row and was not able to get her labs done. Retransmitted, and printed for pt to  orders

## 2019-09-26 ENCOUNTER — OFFICE VISIT (OUTPATIENT)
Dept: ORTHOPEDICS | Facility: CLINIC | Age: 54
End: 2019-09-26
Payer: COMMERCIAL

## 2019-09-26 ENCOUNTER — OFFICE VISIT (OUTPATIENT)
Dept: FAMILY MEDICINE | Facility: CLINIC | Age: 54
End: 2019-09-26
Payer: COMMERCIAL

## 2019-09-26 VITALS
BODY MASS INDEX: 29.7 KG/M2 | DIASTOLIC BLOOD PRESSURE: 80 MMHG | HEIGHT: 68 IN | SYSTOLIC BLOOD PRESSURE: 108 MMHG | WEIGHT: 196 LBS

## 2019-09-26 VITALS
HEART RATE: 75 BPM | BODY MASS INDEX: 29.7 KG/M2 | SYSTOLIC BLOOD PRESSURE: 118 MMHG | DIASTOLIC BLOOD PRESSURE: 70 MMHG | WEIGHT: 196 LBS | HEIGHT: 68 IN

## 2019-09-26 DIAGNOSIS — M17.12 PRIMARY OSTEOARTHRITIS OF LEFT KNEE: ICD-10-CM

## 2019-09-26 DIAGNOSIS — Z95.0 PACEMAKER: ICD-10-CM

## 2019-09-26 DIAGNOSIS — E03.9 HYPOTHYROIDISM, UNSPECIFIED TYPE: ICD-10-CM

## 2019-09-26 DIAGNOSIS — H65.06 RECURRENT ACUTE SEROUS OTITIS MEDIA OF BOTH EARS: ICD-10-CM

## 2019-09-26 DIAGNOSIS — E78.5 HYPERLIPIDEMIA, UNSPECIFIED HYPERLIPIDEMIA TYPE: Primary | ICD-10-CM

## 2019-09-26 DIAGNOSIS — I10 HYPERTENSION, UNSPECIFIED TYPE: ICD-10-CM

## 2019-09-26 DIAGNOSIS — Z98.890 STATUS POST ARTHROSCOPIC SURGERY OF LEFT KNEE: Primary | ICD-10-CM

## 2019-09-26 DIAGNOSIS — E04.1 THYROID NODULE: ICD-10-CM

## 2019-09-26 PROCEDURE — 99024 PR POST-OP FOLLOW-UP VISIT: ICD-10-PCS | Mod: S$GLB,,, | Performed by: ORTHOPAEDIC SURGERY

## 2019-09-26 PROCEDURE — 99214 OFFICE O/P EST MOD 30 MIN: CPT | Mod: S$GLB,,, | Performed by: NURSE PRACTITIONER

## 2019-09-26 PROCEDURE — 99024 POSTOP FOLLOW-UP VISIT: CPT | Mod: S$GLB,,, | Performed by: ORTHOPAEDIC SURGERY

## 2019-09-26 PROCEDURE — 99214 PR OFFICE/OUTPT VISIT, EST, LEVL IV, 30-39 MIN: ICD-10-PCS | Mod: S$GLB,,, | Performed by: NURSE PRACTITIONER

## 2019-09-26 RX ORDER — OLMESARTAN MEDOXOMIL 20 MG/1
20 TABLET ORAL DAILY
Refills: 11 | COMMUNITY
Start: 2019-09-19 | End: 2020-11-05

## 2019-09-26 RX ORDER — THYROID 60 MG/1
60 TABLET ORAL
Qty: 30 TABLET | Refills: 11 | Status: SHIPPED | OUTPATIENT
Start: 2019-09-26 | End: 2019-10-21

## 2019-09-26 RX ORDER — METHYLPREDNISOLONE 4 MG/1
TABLET ORAL
Qty: 1 PACKAGE | Refills: 0 | Status: SHIPPED | OUTPATIENT
Start: 2019-09-26 | End: 2019-10-17

## 2019-09-26 NOTE — PROGRESS NOTES
[facility] POST-OP NOTE    Subjective:           Chief Complaint:   Chief Complaint   Patient presents with    Left Knee - Post-op Evaluation     Left knee scope 8.21.19. States that her knee is doing alright        Past Medical History:   Diagnosis Date    Bradycardia     Hypertension     Thyroid disease        Past Surgical History:   Procedure Laterality Date    ARTHROSCOPY OF KNEE Left 8/21/2019    Procedure: ARTHROSCOPY, KNEE;  Surgeon: Porter Cifuentes MD;  Location: [facility] OR;  Service: Orthopedics;  Laterality: Left;    ATRIAL CARDIAC PACEMAKER INSERTION      EXCISION OF MEDIAL MENISCUS OF KNEE Left 8/21/2019    Procedure: MENISCECTOMY, KNEE,  partial,MEDIAL,lateral;  Surgeon: Porter Cifuentes MD;  Location: [facility];  Service: Orthopedics;  Laterality: Left;    HYSTERECTOMY  1999    INSERTION OF PACEMAKER Left 12/2014    KNEE ARTHROSCOPY      left shoulder      SURGICAL REMOVAL OF BONE SPUR Left 2016    Removed from leftr shoulder       Current Outpatient Medications   Medication Sig    ARMOUR THYROID 90 mg Tab TAKE 90MG BY MOUTH EVERY NIGHT AT BEDTIME    ascorbic acid, vitamin C, (VITAMIN C) 500 MG tablet Take 500 mg by mouth every evening.     estradiol (ESTRACE) 2 MG tablet Take 2 mg by mouth every evening.     metoprolol succinate (TOPROL-XL) 25 MG 24 hr tablet Take 0.5 tablets (12.5 mg total) by mouth every evening. (Patient taking differently: Take 25 mg by mouth every evening. )    multivitamin (THERAGRAN) per tablet Take 0.5 capsules by mouth every evening.     olmesartan (BENICAR) 20 MG tablet Take 20 mg by mouth once daily.    potassium gluconate 595 mg (99 mg) Tab Take 1 tablet by mouth every evening.    thyroid, pork, (ARMOUR THYROID) 60 mg Tab Take 1 tablet (60 mg total) by mouth before breakfast.    vitamin D (VITAMIN D3) 1000 units Tab Take 1,000 Units by mouth every evening.     KRILL OIL ORAL Take 1,000 mg by mouth every evening.     methylPREDNISolone (MEDROL  DOSEPACK) 4 mg tablet use as directed (Patient not taking: Reported on 2019)     No current facility-administered medications for this visit.        Review of patient's allergies indicates:   Allergen Reactions    Comtrex Other (See Comments)       Family History   Problem Relation Age of Onset    Diabetes Mother     No Known Problems Father     Heart disease Maternal Aunt     Diabetes Maternal Grandmother     Diabetes Maternal Grandfather        Social History     Socioeconomic History    Marital status:      Spouse name: Not on file    Number of children: Not on file    Years of education: Not on file    Highest education level: Not on file   Occupational History    Not on file   Social Needs    Financial resource strain: Not on file    Food insecurity:     Worry: Not on file     Inability: Not on file    Transportation needs:     Medical: Not on file     Non-medical: Not on file   Tobacco Use    Smoking status: Former Smoker     Packs/day: 0.50     Years: 10.00     Pack years: 5.00     Start date:      Last attempt to quit:      Years since quittin.7    Smokeless tobacco: Never Used   Substance and Sexual Activity    Alcohol use: Yes     Comment: occassional    Drug use: Not Currently    Sexual activity: Not on file   Lifestyle    Physical activity:     Days per week: Not on file     Minutes per session: Not on file    Stress: Not on file   Relationships    Social connections:     Talks on phone: Not on file     Gets together: Not on file     Attends [employer] service: Not on file     Active member of club or organization: Not on file     Attends meetings of clubs or organizations: Not on file     Relationship status: Not on file   Other Topics Concern    Not on file   Social History Narrative    Not on file       History of present illness: Patient comes in today for the left knee. She is generally doing well. She has a little achy pain but it's mild    Review of  Systems:    Musculoskeletal:  See HPI      Objective:        Physical Examination:    Vital Signs:    Vitals:    09/26/19 1524   BP: 118/70   Pulse: 75       Body mass index is 29.8 kg/m².    This a well-developed, well nourished patient in no acute distress.  They are alert and oriented and cooperative to examination.        . Wounds are clean dry and intact calf is soft    Pertinent New Results:    XRAY Report / Interpretation:       Assessment/Plan:      Stable following arthroscopy of the knee. I went over the findings with her in great detail. Follow-up when necessary    This note was created using Dragon voice recognition software that occasionally misinterpreted phrases or words.

## 2019-09-26 NOTE — PROGRESS NOTES
Patient ID: Mariah Ashton is a 54 y.o. female.    Chief Complaint: Follow-up    Presents for check up. Has not been feeling great. Was recently hospitalized. Medication changes. Since then feels dizzy and lightheaded at times. Has tried stopping metoprolol with no improvement of symptoms. Saw DR. Lynn for follow up. Given zpack for possible sinus infection. Completed last dose today. Still not feeling much better. Monitors bp daily 1-2 x per day. Readings seem to be well controlled. Heart rate usually in the 80-90 range. No shortness of breath. No chest pain.           Past Medical History:   Diagnosis Date    Bradycardia     Hypertension     Thyroid disease      Past Surgical History:   Procedure Laterality Date    ARTHROSCOPY OF KNEE Left 8/21/2019    Procedure: ARTHROSCOPY, KNEE;  Surgeon: Porter Cifuentes MD;  Location: [facility];  Service: Orthopedics;  Laterality: Left;    ATRIAL CARDIAC PACEMAKER INSERTION      EXCISION OF MEDIAL MENISCUS OF KNEE Left 8/21/2019    Procedure: MENISCECTOMY, KNEE,  partial,MEDIAL,lateral;  Surgeon: Porter Cifuentes MD;  Location: [facility] OR;  Service: Orthopedics;  Laterality: Left;    HYSTERECTOMY  1999    INSERTION OF PACEMAKER Left 12/2014    KNEE ARTHROSCOPY      left shoulder      SURGICAL REMOVAL OF BONE SPUR Left 2016    Removed from leftr shoulder         Tobacco History:  reports that she quit smoking about 24 years ago. She started smoking about 34 years ago. She has a 5.00 pack-year smoking history. She has never used smokeless tobacco.      Review of patient's allergies indicates:   Allergen Reactions    Comtrex Other (See Comments)       Current Outpatient Medications:     ARMOUR THYROID 90 mg Tab, TAKE 90MG BY MOUTH EVERY NIGHT AT BEDTIME, Disp: , Rfl: 5    ascorbic acid, vitamin C, (VITAMIN C) 500 MG tablet, Take 500 mg by mouth every evening. , Disp: , Rfl:     estradiol (ESTRACE) 2 MG tablet, Take 2 mg by mouth every evening. , Disp: , Rfl: 1     "metoprolol succinate (TOPROL-XL) 25 MG 24 hr tablet, Take 0.5 tablets (12.5 mg total) by mouth every evening. (Patient taking differently: Take 25 mg by mouth every evening. ), Disp: , Rfl: 3    multivitamin (THERAGRAN) per tablet, Take 0.5 capsules by mouth every evening. , Disp: , Rfl:     olmesartan (BENICAR) 20 MG tablet, Take 20 mg by mouth once daily., Disp: , Rfl: 11    potassium gluconate 595 mg (99 mg) Tab, Take 1 tablet by mouth every evening., Disp: , Rfl:     vitamin D (VITAMIN D3) 1000 units Tab, Take 1,000 Units by mouth every evening. , Disp: , Rfl:     KRILL OIL ORAL, Take 1,000 mg by mouth every evening. , Disp: , Rfl:     methylPREDNISolone (MEDROL DOSEPACK) 4 mg tablet, use as directed (Patient not taking: Reported on 9/26/2019), Disp: 1 Package, Rfl: 0    thyroid, pork, (ARMOUR THYROID) 60 mg Tab, Take 1 tablet (60 mg total) by mouth before breakfast., Disp: 30 tablet, Rfl: 11    Review of Systems   Constitutional: Negative for chills, fever and unexpected weight change.   HENT: Positive for congestion. Negative for ear pain, rhinorrhea and sore throat.    Eyes: Negative for pain and visual disturbance.   Respiratory: Negative for cough and shortness of breath.    Cardiovascular: Negative for chest pain, palpitations and leg swelling.   Gastrointestinal: Negative for abdominal pain, diarrhea, nausea and vomiting.   Genitourinary: Negative for difficulty urinating.   Musculoskeletal: Negative for arthralgias.   Skin: Negative for rash.   Neurological: Negative for dizziness, weakness and headaches.   Psychiatric/Behavioral: Negative for agitation and sleep disturbance. The patient is not nervous/anxious.           Objective:      Vitals:    09/26/19 1248 09/26/19 1249 09/26/19 1250   BP: 110/80 118/88 108/80   Weight: 88.9 kg (196 lb)     Height: 5' 8" (1.727 m)       Physical Exam   Constitutional: She is oriented to person, place, and time. She appears well-developed and well-nourished. "   HENT:   Right Ear: External ear normal. Tympanic membrane is bulging.   Left Ear: External ear normal. Tympanic membrane is bulging.   Mouth/Throat: Oropharynx is clear and moist.   Eyes: Pupils are equal, round, and reactive to light. Conjunctivae are normal.   Neck: Normal range of motion. Neck supple. No JVD present.   Cardiovascular: Normal rate and regular rhythm.   No murmur heard.  Pulmonary/Chest: Effort normal and breath sounds normal.   Pacemaker left chest wall   Abdominal: Soft. Bowel sounds are normal.   Musculoskeletal: Normal range of motion. She exhibits no edema or deformity.   Lymphadenopathy:     She has no cervical adenopathy.   Neurological: She is alert and oriented to person, place, and time. Gait normal.   Skin: Skin is warm, dry and intact. No rash noted.   Psychiatric: She has a normal mood and affect. Her speech is normal and behavior is normal.         Assessment:       1. Hyperlipidemia, unspecified hyperlipidemia type    2. Hypertension, unspecified type    3. Hypothyroidism, unspecified type    4. Pacemaker    5. Thyroid nodule    6. Recurrent acute serous otitis media of both ears           Plan:       Hyperlipidemia, unspecified hyperlipidemia type    Hypertension, unspecified type  Comments:  contiue to monitor bp    Hypothyroidism, unspecified type  Comments:  alternate thyroid medication. armour 60 and 90 tsh is low but in normal range. we are going to try adjusting level slightly to see if symptoms improve  Orders:  -     TSH w/reflex to FT4; Future; Expected date: 09/26/2019    Pacemaker    Thyroid nodule    Recurrent acute serous otitis media of both ears  Comments:  call is symptoms persist  Orders:  -     methylPREDNISolone (MEDROL DOSEPACK) 4 mg tablet; use as directed (Patient not taking: Reported on 9/26/2019)  Dispense: 1 Package; Refill: 0    Other orders  -     thyroid, pork, (ARMOUR THYROID) 60 mg Tab; Take 1 tablet (60 mg total) by mouth before breakfast.  Dispense:  30 tablet; Refill: 11      Follow up in about 3 months (around 12/26/2019).        10/1/2019 Julienne Damon NP

## 2019-09-27 ENCOUNTER — TELEPHONE (OUTPATIENT)
Dept: FAMILY MEDICINE | Facility: CLINIC | Age: 54
End: 2019-09-27

## 2019-09-27 NOTE — TELEPHONE ENCOUNTER
Spoke with pt, states she was confused about which pack she took. I let her know per Julienne to take the medrol dose pack.

## 2019-09-27 NOTE — TELEPHONE ENCOUNTER
Spoke with pt, states Julienne sent in a medrol dose pack. She had just finished one last week with another dr and wanted to make sure that is really what Julienne wanted her to take again. Pending a response from Julienne.

## 2019-09-27 NOTE — TELEPHONE ENCOUNTER
----- Message from Jacquelin Triana sent at 9/27/2019  8:34 AM CDT -----  Contact: Mariah Ashton  Patient has question about some medicine that she was prescribed yesterday . And she needs the nurse to give her a call back today please. Pt# [phone]

## 2019-10-17 ENCOUNTER — TELEPHONE (OUTPATIENT)
Dept: FAMILY MEDICINE | Facility: CLINIC | Age: 54
End: 2019-10-17

## 2019-10-21 RX ORDER — THYROID, PORCINE 90 MG/1
1 TABLET ORAL
Qty: 30 TABLET | Refills: 5 | Status: SHIPPED | OUTPATIENT
Start: 2019-10-21 | End: 2019-12-16

## 2019-10-21 NOTE — TELEPHONE ENCOUNTER
----- Message from Shantell Herrera sent at 10/21/2019  8:53 AM CDT -----  Armor thyroid 90   Pharm [facility]   Pt [phone]

## 2019-10-31 ENCOUNTER — OFFICE VISIT (OUTPATIENT)
Dept: FAMILY MEDICINE | Facility: CLINIC | Age: 54
End: 2019-10-31
Payer: COMMERCIAL

## 2019-10-31 VITALS
HEIGHT: 70 IN | SYSTOLIC BLOOD PRESSURE: 120 MMHG | RESPIRATION RATE: 18 BRPM | HEART RATE: 70 BPM | BODY MASS INDEX: 29.35 KG/M2 | WEIGHT: 205 LBS | DIASTOLIC BLOOD PRESSURE: 80 MMHG

## 2019-10-31 DIAGNOSIS — E03.9 PRIMARY HYPOTHYROIDISM: ICD-10-CM

## 2019-10-31 DIAGNOSIS — Z95.0 PACEMAKER: ICD-10-CM

## 2019-10-31 DIAGNOSIS — I71.20 THORACIC AORTIC ANEURYSM WITHOUT RUPTURE: Chronic | ICD-10-CM

## 2019-10-31 DIAGNOSIS — R55 SYNCOPE, UNSPECIFIED SYNCOPE TYPE: Primary | ICD-10-CM

## 2019-10-31 DIAGNOSIS — E78.1 PURE HYPERGLYCERIDEMIA: ICD-10-CM

## 2019-10-31 DIAGNOSIS — I10 HYPERTENSION, UNSPECIFIED TYPE: ICD-10-CM

## 2019-10-31 PROCEDURE — 99214 OFFICE O/P EST MOD 30 MIN: CPT | Mod: S$GLB,,, | Performed by: NURSE PRACTITIONER

## 2019-10-31 PROCEDURE — 99214 PR OFFICE/OUTPT VISIT, EST, LEVL IV, 30-39 MIN: ICD-10-PCS | Mod: S$GLB,,, | Performed by: NURSE PRACTITIONER

## 2019-10-31 NOTE — PROGRESS NOTES
SUBJECTIVE:    Patient ID: Mariah Ashton is a 54 y.o. female.    Chief Complaint: Feeling bad (Follow up appt)    Presents for check up. Scheduled to see Dr. Lynn later today. Has seen dr. Lynn since last visit and metoprolol dose was decreased due to 'not feeling  Well; since adjusting dose feels better. Did have episode last week that experienced lightheadedness. Lasted about 30 minutes then resolved. Checking blood pressure 2-3 x per day. Due for repeat labs in december        Admission on 08/30/2019, Discharged on 08/31/2019   Component Date Value Ref Range Status    WBC 08/30/2019 10.28  3.90 - 12.70 K/uL Final    RBC 08/30/2019 4.87  4.00 - 5.40 M/uL Final    Hemoglobin 08/30/2019 14.5  12.0 - 16.0 g/dL Final    Hematocrit 08/30/2019 43.9  37.0 - 48.5 % Final    Mean Corpuscular Volume 08/30/2019 90  82 - 98 fL Final    Mean Corpuscular Hemoglobin 08/30/2019 29.8  27.0 - 31.0 pg Final    Mean Corpuscular Hemoglobin Conc 08/30/2019 33.0  32.0 - 36.0 g/dL Final    RDW 08/30/2019 12.5  11.5 - 14.5 % Final    Platelets 08/30/2019 285  150 - 350 K/uL Final    MPV 08/30/2019 9.0* 9.2 - 12.9 fL Final    Immature Granulocytes 08/30/2019 0.4  0.0 - 0.5 % Final    Gran # (ANC) 08/30/2019 6.6  1.8 - 7.7 K/uL Final    Immature Grans (Abs) 08/30/2019 0.04  0.00 - 0.04 K/uL Final    Lymph # 08/30/2019 2.7  1.0 - 4.8 K/uL Final    Mono # 08/30/2019 0.7  0.3 - 1.0 K/uL Final    Eos # 08/30/2019 0.1  0.0 - 0.5 K/uL Final    Baso # 08/30/2019 0.06  0.00 - 0.20 K/uL Final    nRBC 08/30/2019 0  0 /100 WBC Final    Gran% 08/30/2019 64.5  38.0 - 73.0 % Final    Lymph% 08/30/2019 26.5  18.0 - 48.0 % Final    Mono% 08/30/2019 6.8  4.0 - 15.0 % Final    Eosinophil% 08/30/2019 1.2  0.0 - 8.0 % Final    Basophil% 08/30/2019 0.6  0.0 - 1.9 % Final    Differential Method 08/30/2019 Automated   Final    Sodium 08/30/2019 139  136 - 145 mmol/L Final    Potassium 08/30/2019 3.1* 3.5 - 5.1 mmol/L Final     Chloride 08/30/2019 100  95 - 110 mmol/L Final    CO2 08/30/2019 24  23 - 29 mmol/L Final    Glucose 08/30/2019 85  70 - 110 mg/dL Final    BUN, Bld 08/30/2019 23* 6 - 20 mg/dL Final    Creatinine 08/30/2019 0.7  0.5 - 1.4 mg/dL Final    Calcium 08/30/2019 9.0  8.7 - 10.5 mg/dL Final    Total Protein 08/30/2019 7.1  6.0 - 8.4 g/dL Final    Albumin 08/30/2019 3.6  3.5 - 5.2 g/dL Final    Total Bilirubin 08/30/2019 0.6  0.1 - 1.0 mg/dL Final    Alkaline Phosphatase 08/30/2019 68  55 - 135 U/L Final    AST 08/30/2019 21  10 - 40 U/L Final    ALT 08/30/2019 14  10 - 44 U/L Final    Anion Gap 08/30/2019 15  8 - 16 mmol/L Final    eGFR if African American 08/30/2019 >60.0  >60 mL/min/1.73 m^2 Final    eGFR if non African American 08/30/2019 >60.0  >60 mL/min/1.73 m^2 Final    Troponin I 08/30/2019 <0.030  0.020 - 0.040 ng/mL Final    Troponin I 08/30/2019 <0.030  0.020 - 0.040 ng/mL Final    BNP 08/30/2019 36  0 - 99 pg/mL Final    D-Dimer 08/30/2019 0.58* <0.50 ug/mL FEU Final    Troponin I 08/31/2019 <0.030  0.020 - 0.040 ng/mL Final    Sodium 08/31/2019 135* 136 - 145 mmol/L Final    Potassium 08/31/2019 4.1  3.5 - 5.1 mmol/L Final    Chloride 08/31/2019 105  95 - 110 mmol/L Final    CO2 08/31/2019 22* 23 - 29 mmol/L Final    Glucose 08/31/2019 98  70 - 110 mg/dL Final    BUN, Bld 08/31/2019 19  6 - 20 mg/dL Final    Creatinine 08/31/2019 0.5  0.5 - 1.4 mg/dL Final    Calcium 08/31/2019 8.0* 8.7 - 10.5 mg/dL Final    Anion Gap 08/31/2019 8  8 - 16 mmol/L Final    eGFR if African American 08/31/2019 >60.0  >60 mL/min/1.73 m^2 Final    eGFR if non African American 08/31/2019 >60.0  >60 mL/min/1.73 m^2 Final    Magnesium 08/31/2019 1.8  1.6 - 2.6 mg/dL Final    WBC 08/31/2019 9.16  3.90 - 12.70 K/uL Final    RBC 08/31/2019 4.22  4.00 - 5.40 M/uL Final    Hemoglobin 08/31/2019 12.5  12.0 - 16.0 g/dL Final    Hematocrit 08/31/2019 37.6  37.0 - 48.5 % Final    Mean Corpuscular Volume  08/31/2019 89  82 - 98 fL Final    Mean Corpuscular Hemoglobin 08/31/2019 29.6  27.0 - 31.0 pg Final    Mean Corpuscular Hemoglobin Conc 08/31/2019 33.2  32.0 - 36.0 g/dL Final    RDW 08/31/2019 12.6  11.5 - 14.5 % Final    Platelets 08/31/2019 237  150 - 350 K/uL Final    MPV 08/31/2019 8.8* 9.2 - 12.9 fL Final    Immature Granulocytes 08/31/2019 0.3  0.0 - 0.5 % Final    Gran # (ANC) 08/31/2019 4.3  1.8 - 7.7 K/uL Final    Immature Grans (Abs) 08/31/2019 0.03  0.00 - 0.04 K/uL Final    Lymph # 08/31/2019 3.8  1.0 - 4.8 K/uL Final    Mono # 08/31/2019 0.8  0.3 - 1.0 K/uL Final    Eos # 08/31/2019 0.2  0.0 - 0.5 K/uL Final    Baso # 08/31/2019 0.05  0.00 - 0.20 K/uL Final    nRBC 08/31/2019 0  0 /100 WBC Final    Gran% 08/31/2019 47.2  38.0 - 73.0 % Final    Lymph% 08/31/2019 41.3  18.0 - 48.0 % Final    Mono% 08/31/2019 8.8  4.0 - 15.0 % Final    Eosinophil% 08/31/2019 1.9  0.0 - 8.0 % Final    Basophil% 08/31/2019 0.5  0.0 - 1.9 % Final    Differential Method 08/31/2019 Automated   Final   Lab Visit on 08/16/2019   Component Date Value Ref Range Status    WBC 08/16/2019 8.47  3.90 - 12.70 K/uL Final    RBC 08/16/2019 4.59  4.00 - 5.40 M/uL Final    Hemoglobin 08/16/2019 13.8  12.0 - 16.0 g/dL Final    Hematocrit 08/16/2019 41.3  37.0 - 48.5 % Final    Mean Corpuscular Volume 08/16/2019 90  82 - 98 fL Final    Mean Corpuscular Hemoglobin 08/16/2019 30.1  27.0 - 31.0 pg Final    Mean Corpuscular Hemoglobin Conc 08/16/2019 33.4  32.0 - 36.0 g/dL Final    RDW 08/16/2019 12.6  11.5 - 14.5 % Final    Platelets 08/16/2019 286  150 - 350 K/uL Final    MPV 08/16/2019 9.3  9.2 - 12.9 fL Final    Sodium 08/16/2019 137  136 - 145 mmol/L Final    Potassium 08/16/2019 4.0  3.5 - 5.1 mmol/L Final    Chloride 08/16/2019 101  95 - 110 mmol/L Final    CO2 08/16/2019 26  23 - 29 mmol/L Final    Glucose 08/16/2019 87  70 - 110 mg/dL Final    BUN, Bld 08/16/2019 17  6 - 20 mg/dL Final    Creatinine  08/16/2019 0.6  0.5 - 1.4 mg/dL Final    Calcium 08/16/2019 9.0  8.7 - 10.5 mg/dL Final    Total Protein 08/16/2019 6.9  6.0 - 8.4 g/dL Final    Albumin 08/16/2019 3.8  3.5 - 5.2 g/dL Final    Total Bilirubin 08/16/2019 0.6  0.1 - 1.0 mg/dL Final    Alkaline Phosphatase 08/16/2019 61  55 - 135 U/L Final    AST 08/16/2019 18  10 - 40 U/L Final    ALT 08/16/2019 15  10 - 44 U/L Final    Anion Gap 08/16/2019 10  8 - 16 mmol/L Final    eGFR if African American 08/16/2019 >60.0  >60 mL/min/1.73 m^2 Final    eGFR if non African American 08/16/2019 >60.0  >60 mL/min/1.73 m^2 Final       Past Medical History:   Diagnosis Date    Bradycardia     Hypertension     Thyroid disease      Past Surgical History:   Procedure Laterality Date    ARTHROSCOPY OF KNEE Left 8/21/2019    Procedure: ARTHROSCOPY, KNEE;  Surgeon: Porter Cifuentes MD;  Location[facility] OR;  Service: Orthopedics;  Laterality: Left;    ATRIAL CARDIAC PACEMAKER INSERTION      EXCISION OF MEDIAL MENISCUS OF KNEE Left 8/21/2019    Procedure: MENISCECTOMY, KNEE,  partial,MEDIAL,lateral;  Surgeon: Porter Cifuentes MD;  Location: [facility] OR;  Service: Orthopedics;  Laterality: Left;    HYSTERECTOMY  1999    INSERTION OF PACEMAKER Left 12/2014    KNEE ARTHROSCOPY      left shoulder      SURGICAL REMOVAL OF BONE SPUR Left 2016    Removed from leftr shoulder     Family History   Problem Relation Age of Onset    Diabetes Mother     No Known Problems Father     Heart disease Maternal Aunt     Diabetes Maternal Grandmother     Diabetes Maternal Grandfather        Marital Status:   Alcohol History:  reports that she drinks alcohol.  Tobacco History:  reports that she quit smoking about 24 years ago. She started smoking about 34 years ago. She has a 5.00 pack-year smoking history. She has never used smokeless tobacco.  Drug History:  reports that she has current or past drug history.    Review of patient's allergies indicates:   Allergen Reactions     "Comtrex Other (See Comments)       Current Outpatient Medications:     ARMOUR THYROID 90 mg Tab, Take 1 tablet (90 mg total) by mouth before breakfast., Disp: 30 tablet, Rfl: 5    ascorbic acid, vitamin C, (VITAMIN C) 500 MG tablet, Take 500 mg by mouth every evening. , Disp: , Rfl:     estradiol (ESTRACE) 2 MG tablet, Take 2 mg by mouth every evening. , Disp: , Rfl: 1    KRILL OIL ORAL, Take 1,000 mg by mouth every Mon, Wed, Fri. , Disp: , Rfl:     metoprolol succinate (TOPROL-XL) 25 MG 24 hr tablet, Take 0.5 tablets (12.5 mg total) by mouth every evening. (Patient taking differently: Take 25 mg by mouth every evening. ), Disp: , Rfl: 3    multivitamin (THERAGRAN) per tablet, Take 0.5 capsules by mouth every evening. , Disp: , Rfl:     olmesartan (BENICAR) 20 MG tablet, Take 20 mg by mouth once daily. , Disp: , Rfl: 11    potassium gluconate 595 mg (99 mg) Tab, Take 1 tablet by mouth every evening., Disp: , Rfl:     vitamin D (VITAMIN D3) 1000 units Tab, Take 1,000 Units by mouth every evening. , Disp: , Rfl:     Review of Systems   Constitutional: Negative for chills, fever and unexpected weight change.   HENT: Negative for ear pain, rhinorrhea and sore throat.    Eyes: Negative for pain.   Respiratory: Negative for cough and shortness of breath.    Cardiovascular: Negative for chest pain, palpitations and leg swelling.   Gastrointestinal: Negative for abdominal pain, diarrhea, nausea and vomiting.   Genitourinary: Negative for difficulty urinating, hematuria and vaginal bleeding.   Musculoskeletal: Negative for arthralgias.   Skin: Negative for rash.   Neurological: Positive for dizziness. Negative for weakness and headaches.   Psychiatric/Behavioral: Negative for agitation and sleep disturbance. The patient is not nervous/anxious.           Objective:      Vitals:    10/31/19 0927   BP: 120/80   Pulse: 70   Resp: 18   Weight: 93 kg (205 lb)   Height: 5' 9.5" (1.765 m)     Body mass index is 29.84 " kg/m².  Physical Exam   Constitutional: She is oriented to person, place, and time. She appears well-developed and well-nourished.   HENT:   Right Ear: External ear normal.   Left Ear: External ear normal.   Mouth/Throat: Oropharynx is clear and moist.   Neck: Normal range of motion. Neck supple. No JVD present.   Cardiovascular: Normal rate and regular rhythm.   No murmur heard.  Pulmonary/Chest: Effort normal and breath sounds normal.   Abdominal: Soft. Bowel sounds are normal.   Musculoskeletal: Normal range of motion. She exhibits no edema or deformity.   Lymphadenopathy:     She has no cervical adenopathy.   Neurological: She is alert and oriented to person, place, and time. She has normal strength. She displays a negative Romberg sign. Coordination and gait normal.   Skin: Skin is warm, dry and intact. No rash noted.   Psychiatric: She has a normal mood and affect. Her speech is normal and behavior is normal.         Assessment:       1. Syncope, unspecified syncope type    2. Thoracic aortic aneurysm without rupture    3. Primary hypothyroidism    4. Pure hyperglyceridemia    5. Pacemaker    6. Hypertension, unspecified type         Plan:       Syncope, unspecified syncope type    Thoracic aortic aneurysm without rupture    Primary hypothyroidism  Comments:  labs in december as ordered    Pure hyperglyceridemia    Pacemaker    Hypertension, unspecified type  Comments:  leann discuss further with dr her today      Follow up in about 4 weeks (around 11/28/2019).

## 2019-11-04 RX ORDER — ESTRADIOL 2 MG/1
2 TABLET ORAL NIGHTLY
Qty: 30 TABLET | Refills: 5 | Status: SHIPPED | OUTPATIENT
Start: 2019-11-04 | End: 2020-03-26 | Stop reason: SDUPTHER

## 2019-11-04 NOTE — TELEPHONE ENCOUNTER
----- Message from Jane Roldan sent at 11/4/2019  9:24 AM CST -----  Contact: pt  Pt called and said she needs refill on Estradiol     [facility]    [phone]

## 2019-11-10 LAB
T4 FREE SERPL-MCNC: 0.64 NG/DL (ref 0.82–1.77)
TSH SERPL DL<=0.005 MIU/L-ACNC: 2.97 UIU/ML (ref 0.45–4.5)

## 2019-11-20 ENCOUNTER — TELEPHONE (OUTPATIENT)
Dept: FAMILY MEDICINE | Facility: CLINIC | Age: 54
End: 2019-11-20

## 2019-11-20 NOTE — TELEPHONE ENCOUNTER
----- Message from Jacquelin Triana sent at 11/20/2019  8:55 AM CST -----  Contact: Mariah Ashton  Patient says she needs the nurse to give her a call back please, in regards to getting a certain form signed.   Pt# [phone]

## 2019-11-21 ENCOUNTER — TELEPHONE (OUTPATIENT)
Dept: FAMILY MEDICINE | Facility: CLINIC | Age: 54
End: 2019-11-21

## 2019-12-10 ENCOUNTER — TELEPHONE (OUTPATIENT)
Dept: FAMILY MEDICINE | Facility: CLINIC | Age: 54
End: 2019-12-10

## 2019-12-10 NOTE — TELEPHONE ENCOUNTER
Spoke to patient that fasting lab is due and that these are labs ordered at 9/16 telephone encounter for Lexy. Patient said that she isn't due for these labs. Is this correct? Patient wanted me to ask Julienne, said this is who she usually sees.

## 2019-12-16 ENCOUNTER — OFFICE VISIT (OUTPATIENT)
Dept: FAMILY MEDICINE | Facility: CLINIC | Age: 54
End: 2019-12-16
Payer: COMMERCIAL

## 2019-12-16 VITALS
WEIGHT: 202 LBS | DIASTOLIC BLOOD PRESSURE: 76 MMHG | HEIGHT: 70 IN | SYSTOLIC BLOOD PRESSURE: 124 MMHG | HEART RATE: 68 BPM | BODY MASS INDEX: 28.92 KG/M2

## 2019-12-16 DIAGNOSIS — E04.1 THYROID NODULE: ICD-10-CM

## 2019-12-16 DIAGNOSIS — E03.9 HYPOTHYROIDISM, UNSPECIFIED TYPE: ICD-10-CM

## 2019-12-16 DIAGNOSIS — E03.9 PRIMARY HYPOTHYROIDISM: Primary | ICD-10-CM

## 2019-12-16 DIAGNOSIS — H65.06 RECURRENT ACUTE SEROUS OTITIS MEDIA OF BOTH EARS: ICD-10-CM

## 2019-12-16 DIAGNOSIS — R42 DIZZINESS: ICD-10-CM

## 2019-12-16 DIAGNOSIS — H60.543 DERMATITIS OF BOTH EAR CANALS: ICD-10-CM

## 2019-12-16 DIAGNOSIS — Z95.0 PACEMAKER: ICD-10-CM

## 2019-12-16 DIAGNOSIS — I10 HYPERTENSION, UNSPECIFIED TYPE: ICD-10-CM

## 2019-12-16 PROCEDURE — 99214 PR OFFICE/OUTPT VISIT, EST, LEVL IV, 30-39 MIN: ICD-10-PCS | Mod: S$GLB,,, | Performed by: NURSE PRACTITIONER

## 2019-12-16 PROCEDURE — 99214 OFFICE O/P EST MOD 30 MIN: CPT | Mod: S$GLB,,, | Performed by: NURSE PRACTITIONER

## 2019-12-16 RX ORDER — SULFAMETHOXAZOLE AND TRIMETHOPRIM 800; 160 MG/1; MG/1
1 TABLET ORAL
Refills: 11 | COMMUNITY
Start: 2019-10-23 | End: 2020-09-28 | Stop reason: SDUPTHER

## 2019-12-16 RX ORDER — TRIAMCINOLONE ACETONIDE 1 MG/G
OINTMENT TOPICAL 2 TIMES DAILY
Qty: 15 G | Refills: 0 | Status: SHIPPED | OUTPATIENT
Start: 2019-12-16 | End: 2020-04-23

## 2019-12-16 NOTE — PROGRESS NOTES
SUBJECTIVE:    Patient ID: Mariah Ashton is a 54 y.o. female.    Chief Complaint: Follow-up (no bottles, went over meds verbally// SW)    Presents for check up.  Would like to discuss labs from a month ago.  Overall reports feeling okay.  Had what sounds like gastroenteritis last week.  Patient reports became dizzy while at work.  Went home and after getting home started with vomiting and diarrhea.  Symptoms lasted about 24 hr and then resolved.  No fever.  Felt weak but overall is improving.  Scheduled to see Dr. Lynn in January.  Sleeping fine.  No palpitations.  Checks blood pressures occasionally at home.  Readings have been less than 130/90.      Office Visit on 09/26/2019   Component Date Value Ref Range Status    TSH 11/09/2019 2.970  0.450 - 4.500 uIU/mL Final    T4, Free 11/09/2019 0.64* 0.82 - 1.77 ng/dL Final   Admission on 08/30/2019, Discharged on 08/31/2019   Component Date Value Ref Range Status    WBC 08/30/2019 10.28  3.90 - 12.70 K/uL Final    RBC 08/30/2019 4.87  4.00 - 5.40 M/uL Final    Hemoglobin 08/30/2019 14.5  12.0 - 16.0 g/dL Final    Hematocrit 08/30/2019 43.9  37.0 - 48.5 % Final    Mean Corpuscular Volume 08/30/2019 90  82 - 98 fL Final    Mean Corpuscular Hemoglobin 08/30/2019 29.8  27.0 - 31.0 pg Final    Mean Corpuscular Hemoglobin Conc 08/30/2019 33.0  32.0 - 36.0 g/dL Final    RDW 08/30/2019 12.5  11.5 - 14.5 % Final    Platelets 08/30/2019 285  150 - 350 K/uL Final    MPV 08/30/2019 9.0* 9.2 - 12.9 fL Final    Immature Granulocytes 08/30/2019 0.4  0.0 - 0.5 % Final    Gran # (ANC) 08/30/2019 6.6  1.8 - 7.7 K/uL Final    Immature Grans (Abs) 08/30/2019 0.04  0.00 - 0.04 K/uL Final    Lymph # 08/30/2019 2.7  1.0 - 4.8 K/uL Final    Mono # 08/30/2019 0.7  0.3 - 1.0 K/uL Final    Eos # 08/30/2019 0.1  0.0 - 0.5 K/uL Final    Baso # 08/30/2019 0.06  0.00 - 0.20 K/uL Final    nRBC 08/30/2019 0  0 /100 WBC Final    Gran% 08/30/2019 64.5  38.0 - 73.0 % Final     Lymph% 08/30/2019 26.5  18.0 - 48.0 % Final    Mono% 08/30/2019 6.8  4.0 - 15.0 % Final    Eosinophil% 08/30/2019 1.2  0.0 - 8.0 % Final    Basophil% 08/30/2019 0.6  0.0 - 1.9 % Final    Differential Method 08/30/2019 Automated   Final    Sodium 08/30/2019 139  136 - 145 mmol/L Final    Potassium 08/30/2019 3.1* 3.5 - 5.1 mmol/L Final    Chloride 08/30/2019 100  95 - 110 mmol/L Final    CO2 08/30/2019 24  23 - 29 mmol/L Final    Glucose 08/30/2019 85  70 - 110 mg/dL Final    BUN, Bld 08/30/2019 23* 6 - 20 mg/dL Final    Creatinine 08/30/2019 0.7  0.5 - 1.4 mg/dL Final    Calcium 08/30/2019 9.0  8.7 - 10.5 mg/dL Final    Total Protein 08/30/2019 7.1  6.0 - 8.4 g/dL Final    Albumin 08/30/2019 3.6  3.5 - 5.2 g/dL Final    Total Bilirubin 08/30/2019 0.6  0.1 - 1.0 mg/dL Final    Alkaline Phosphatase 08/30/2019 68  55 - 135 U/L Final    AST 08/30/2019 21  10 - 40 U/L Final    ALT 08/30/2019 14  10 - 44 U/L Final    Anion Gap 08/30/2019 15  8 - 16 mmol/L Final    eGFR if African American 08/30/2019 >60.0  >60 mL/min/1.73 m^2 Final    eGFR if non African American 08/30/2019 >60.0  >60 mL/min/1.73 m^2 Final    Troponin I 08/30/2019 <0.030  0.020 - 0.040 ng/mL Final    Troponin I 08/30/2019 <0.030  0.020 - 0.040 ng/mL Final    BNP 08/30/2019 36  0 - 99 pg/mL Final    D-Dimer 08/30/2019 0.58* <0.50 ug/mL FEU Final    Troponin I 08/31/2019 <0.030  0.020 - 0.040 ng/mL Final    Sodium 08/31/2019 135* 136 - 145 mmol/L Final    Potassium 08/31/2019 4.1  3.5 - 5.1 mmol/L Final    Chloride 08/31/2019 105  95 - 110 mmol/L Final    CO2 08/31/2019 22* 23 - 29 mmol/L Final    Glucose 08/31/2019 98  70 - 110 mg/dL Final    BUN, Bld 08/31/2019 19  6 - 20 mg/dL Final    Creatinine 08/31/2019 0.5  0.5 - 1.4 mg/dL Final    Calcium 08/31/2019 8.0* 8.7 - 10.5 mg/dL Final    Anion Gap 08/31/2019 8  8 - 16 mmol/L Final    eGFR if African American 08/31/2019 >60.0  >60 mL/min/1.73 m^2 Final    eGFR if non   08/31/2019 >60.0  >60 mL/min/1.73 m^2 Final    Magnesium 08/31/2019 1.8  1.6 - 2.6 mg/dL Final    WBC 08/31/2019 9.16  3.90 - 12.70 K/uL Final    RBC 08/31/2019 4.22  4.00 - 5.40 M/uL Final    Hemoglobin 08/31/2019 12.5  12.0 - 16.0 g/dL Final    Hematocrit 08/31/2019 37.6  37.0 - 48.5 % Final    Mean Corpuscular Volume 08/31/2019 89  82 - 98 fL Final    Mean Corpuscular Hemoglobin 08/31/2019 29.6  27.0 - 31.0 pg Final    Mean Corpuscular Hemoglobin Conc 08/31/2019 33.2  32.0 - 36.0 g/dL Final    RDW 08/31/2019 12.6  11.5 - 14.5 % Final    Platelets 08/31/2019 237  150 - 350 K/uL Final    MPV 08/31/2019 8.8* 9.2 - 12.9 fL Final    Immature Granulocytes 08/31/2019 0.3  0.0 - 0.5 % Final    Gran # (ANC) 08/31/2019 4.3  1.8 - 7.7 K/uL Final    Immature Grans (Abs) 08/31/2019 0.03  0.00 - 0.04 K/uL Final    Lymph # 08/31/2019 3.8  1.0 - 4.8 K/uL Final    Mono # 08/31/2019 0.8  0.3 - 1.0 K/uL Final    Eos # 08/31/2019 0.2  0.0 - 0.5 K/uL Final    Baso # 08/31/2019 0.05  0.00 - 0.20 K/uL Final    nRBC 08/31/2019 0  0 /100 WBC Final    Gran% 08/31/2019 47.2  38.0 - 73.0 % Final    Lymph% 08/31/2019 41.3  18.0 - 48.0 % Final    Mono% 08/31/2019 8.8  4.0 - 15.0 % Final    Eosinophil% 08/31/2019 1.9  0.0 - 8.0 % Final    Basophil% 08/31/2019 0.5  0.0 - 1.9 % Final    Differential Method 08/31/2019 Automated   Final   Lab Visit on 08/16/2019   Component Date Value Ref Range Status    WBC 08/16/2019 8.47  3.90 - 12.70 K/uL Final    RBC 08/16/2019 4.59  4.00 - 5.40 M/uL Final    Hemoglobin 08/16/2019 13.8  12.0 - 16.0 g/dL Final    Hematocrit 08/16/2019 41.3  37.0 - 48.5 % Final    Mean Corpuscular Volume 08/16/2019 90  82 - 98 fL Final    Mean Corpuscular Hemoglobin 08/16/2019 30.1  27.0 - 31.0 pg Final    Mean Corpuscular Hemoglobin Conc 08/16/2019 33.4  32.0 - 36.0 g/dL Final    RDW 08/16/2019 12.6  11.5 - 14.5 % Final    Platelets 08/16/2019 286  150 - 350 K/uL Final    MPV  08/16/2019 9.3  9.2 - 12.9 fL Final    Sodium 08/16/2019 137  136 - 145 mmol/L Final    Potassium 08/16/2019 4.0  3.5 - 5.1 mmol/L Final    Chloride 08/16/2019 101  95 - 110 mmol/L Final    CO2 08/16/2019 26  23 - 29 mmol/L Final    Glucose 08/16/2019 87  70 - 110 mg/dL Final    BUN, Bld 08/16/2019 17  6 - 20 mg/dL Final    Creatinine 08/16/2019 0.6  0.5 - 1.4 mg/dL Final    Calcium 08/16/2019 9.0  8.7 - 10.5 mg/dL Final    Total Protein 08/16/2019 6.9  6.0 - 8.4 g/dL Final    Albumin 08/16/2019 3.8  3.5 - 5.2 g/dL Final    Total Bilirubin 08/16/2019 0.6  0.1 - 1.0 mg/dL Final    Alkaline Phosphatase 08/16/2019 61  55 - 135 U/L Final    AST 08/16/2019 18  10 - 40 U/L Final    ALT 08/16/2019 15  10 - 44 U/L Final    Anion Gap 08/16/2019 10  8 - 16 mmol/L Final    eGFR if African American 08/16/2019 >60.0  >60 mL/min/1.73 m^2 Final    eGFR if non African American 08/16/2019 >60.0  >60 mL/min/1.73 m^2 Final       Past Medical History:   Diagnosis Date    Bradycardia     Hypertension     Thyroid disease      Past Surgical History:   Procedure Laterality Date    ARTHROSCOPY OF KNEE Left 8/21/2019    Procedure: ARTHROSCOPY, KNEE;  Surgeon: Porter Cifuentes MD;  Location: [facility] OR;  Service: Orthopedics;  Laterality: Left;    ATRIAL CARDIAC PACEMAKER INSERTION      EXCISION OF MEDIAL MENISCUS OF KNEE Left 8/21/2019    Procedure: MENISCECTOMY, KNEE,  partial,MEDIAL,lateral;  Surgeon: Porter Cifuentes MD;  Location: [facility] OR;  Service: Orthopedics;  Laterality: Left;    HYSTERECTOMY  1999    INSERTION OF PACEMAKER Left 12/2014    KNEE ARTHROSCOPY      left shoulder      SURGICAL REMOVAL OF BONE SPUR Left 2016    Removed from leftr shoulder     Family History   Problem Relation Age of Onset    Diabetes Mother     No Known Problems Father     Heart disease Maternal Aunt     Diabetes Maternal Grandmother     Diabetes Maternal Grandfather        Marital Status:   Alcohol History:  reports that she  drinks alcohol.  Tobacco History:  reports that she quit smoking about 24 years ago. She started smoking about 34 years ago. She has a 5.00 pack-year smoking history. She has never used smokeless tobacco.  Drug History:  reports that she has current or past drug history.    Review of patient's allergies indicates:   Allergen Reactions    Comtrex Other (See Comments)       Current Outpatient Medications:     ARMOUR THYROID 60 mg Tab, Take 1 tablet by mouth once daily., Disp: , Rfl: 11    ARMOUR THYROID 90 mg Tab, Take 1 tablet (90 mg total) by mouth before breakfast., Disp: 30 tablet, Rfl: 5    ascorbic acid, vitamin C, (VITAMIN C) 500 MG tablet, Take 500 mg by mouth every evening. , Disp: , Rfl:     estradiol (ESTRACE) 2 MG tablet, Take 1 tablet (2 mg total) by mouth every evening., Disp: 30 tablet, Rfl: 5    KRILL OIL ORAL, Take 1,000 mg by mouth every Mon, Wed, Fri. , Disp: , Rfl:     metoprolol succinate (TOPROL-XL) 25 MG 24 hr tablet, Take 0.5 tablets (12.5 mg total) by mouth every evening. (Patient taking differently: Take 25 mg by mouth every evening. ), Disp: , Rfl: 3    multivitamin (THERAGRAN) per tablet, Take 0.5 capsules by mouth every evening. , Disp: , Rfl:     olmesartan (BENICAR) 20 MG tablet, Take 20 mg by mouth once daily. , Disp: , Rfl: 11    potassium gluconate 595 mg (99 mg) Tab, Take 1 tablet by mouth every evening., Disp: , Rfl:     vitamin D (VITAMIN D3) 1000 units Tab, Take 1,000 Units by mouth every evening. , Disp: , Rfl:     triamcinolone acetonide 0.1% (KENALOG) 0.1 % ointment, Apply topically 2 (two) times daily., Disp: 15 g, Rfl: 0    Review of Systems   Constitutional: Negative for chills, fever and unexpected weight change.   HENT: Negative for ear pain, rhinorrhea and sore throat.    Eyes: Negative for pain and visual disturbance.   Respiratory: Negative for cough and shortness of breath.    Cardiovascular: Negative for chest pain, palpitations and leg swelling.  "  Gastrointestinal: Negative for abdominal pain, diarrhea, nausea and vomiting.   Genitourinary: Negative for difficulty urinating, hematuria and vaginal bleeding.   Musculoskeletal: Negative for arthralgias.   Skin: Negative for rash.   Neurological: Negative for dizziness, weakness and headaches.   Psychiatric/Behavioral: Negative for agitation and sleep disturbance. The patient is not nervous/anxious.           Objective:      Vitals:    12/16/19 1016   BP: 124/76   Pulse: 68   Weight: 91.6 kg (202 lb)   Height: 5' 9.5" (1.765 m)     Body mass index is 29.4 kg/m².  Physical Exam   Constitutional: She is oriented to person, place, and time. She appears well-developed and well-nourished.   HENT:   Head: Normocephalic.   Right Ear: External ear normal.   Left Ear: External ear normal.   Mouth/Throat: Oropharynx is clear and moist.   Eyes: Pupils are equal, round, and reactive to light. Conjunctivae are normal.   Neck: Normal range of motion. Neck supple. No JVD present.   Cardiovascular: Normal rate and regular rhythm.   No murmur heard.  Pulmonary/Chest: Effort normal and breath sounds normal.   Abdominal: Soft. Bowel sounds are normal.   Musculoskeletal: Normal range of motion. She exhibits no edema or deformity.   Lymphadenopathy:     She has no cervical adenopathy.   Neurological: She is alert and oriented to person, place, and time. Gait normal.   Skin: Skin is warm, dry and intact. No rash noted.   Psychiatric: She has a normal mood and affect. Her speech is normal and behavior is normal.         Assessment:       1. Primary hypothyroidism    2. Hypertension, unspecified type    3. Hypothyroidism, unspecified type    4. Thyroid nodule    5. Pacemaker    6. Recurrent acute serous otitis media of both ears    7. Dizziness    8. Dermatitis of both ear canals         Plan:       Primary hypothyroidism  -     TSH w/reflex to FT4; Future; Expected date: 12/16/2019  -     Microalbumin/creatinine urine ratio; Future; " Expected date: 12/16/2019  -     Urinalysis, Reflex to Urine Culture Urine, Clean Catch; Future; Expected date: 12/16/2019  -     T4, free; Future; Expected date: 12/16/2019    Hypertension, unspecified type  -     CBC auto differential; Future; Expected date: 12/16/2019  -     Lipid panel; Future; Expected date: 12/16/2019    Hypothyroidism, unspecified type  -     CBC auto differential; Future; Expected date: 12/16/2019  -     Lipid panel; Future; Expected date: 12/16/2019  -     TSH w/reflex to FT4; Future; Expected date: 12/16/2019    Thyroid nodule  -     Comprehensive metabolic panel; Future; Expected date: 12/16/2019  -     Lipid panel; Future; Expected date: 12/16/2019    Pacemaker  -     CBC auto differential; Future; Expected date: 12/16/2019    Recurrent acute serous otitis media of both ears  -     Comprehensive metabolic panel; Future; Expected date: 12/16/2019    Dizziness  -     CBC auto differential; Future; Expected date: 12/16/2019  -     TSH w/reflex to FT4; Future; Expected date: 12/16/2019  -     Microalbumin/creatinine urine ratio; Future; Expected date: 12/16/2019  -     Urinalysis, Reflex to Urine Culture Urine, Clean Catch; Future; Expected date: 12/16/2019    Dermatitis of both ear canals  -     triamcinolone acetonide 0.1% (KENALOG) 0.1 % ointment; Apply topically 2 (two) times daily.  Dispense: 15 g; Refill: 0      Follow up in about 3 months (around 3/16/2020) for Follow up.

## 2020-01-07 ENCOUNTER — TELEPHONE (OUTPATIENT)
Dept: FAMILY MEDICINE | Facility: CLINIC | Age: 55
End: 2020-01-07

## 2020-01-07 NOTE — TELEPHONE ENCOUNTER
----- Message from Jacquelin Triana sent at 1/7/2020  9:33 AM CST -----  Contact: Mariah Ashton  Pt would like to know if the form that she dropped off last Tuesday for Julienne to sign , is it ready to be picked up/ what's the status on this ??   Pt# [phone]

## 2020-03-08 LAB
ALBUMIN SERPL-MCNC: 4.2 G/DL (ref 3.8–4.9)
ALBUMIN/CREAT UR: 10 MG/G CREAT (ref 0–29)
ALBUMIN/GLOB SERPL: 1.8 {RATIO} (ref 1.2–2.2)
ALP SERPL-CCNC: 65 IU/L (ref 39–117)
ALT SERPL-CCNC: 19 IU/L (ref 0–32)
APPEARANCE UR: ABNORMAL
AST SERPL-CCNC: 21 IU/L (ref 0–40)
BACTERIA #/AREA URNS HPF: NORMAL /[HPF]
BASOPHILS # BLD AUTO: 0 X10E3/UL (ref 0–0.2)
BASOPHILS NFR BLD AUTO: 0 %
BILIRUB SERPL-MCNC: 0.3 MG/DL (ref 0–1.2)
BILIRUB UR QL STRIP: NEGATIVE
BUN SERPL-MCNC: 15 MG/DL (ref 6–24)
BUN/CREAT SERPL: 24 (ref 9–23)
CALCIUM SERPL-MCNC: 9.5 MG/DL (ref 8.7–10.2)
CHLORIDE SERPL-SCNC: 103 MMOL/L (ref 96–106)
CHOLEST SERPL-MCNC: 196 MG/DL (ref 100–199)
CO2 SERPL-SCNC: 24 MMOL/L (ref 20–29)
COLOR UR: YELLOW
CREAT SERPL-MCNC: 0.62 MG/DL (ref 0.57–1)
CREAT UR-MCNC: 102.7 MG/DL
EOSINOPHIL # BLD AUTO: 0.1 X10E3/UL (ref 0–0.4)
EOSINOPHIL NFR BLD AUTO: 2 %
EPI CELLS #/AREA URNS HPF: NORMAL /HPF (ref 0–10)
ERYTHROCYTE [DISTWIDTH] IN BLOOD BY AUTOMATED COUNT: 13.5 % (ref 11.7–15.4)
GLOBULIN SER CALC-MCNC: 2.4 G/DL (ref 1.5–4.5)
GLUCOSE SERPL-MCNC: 92 MG/DL (ref 65–99)
GLUCOSE UR QL: NEGATIVE
HCT VFR BLD AUTO: 41.5 % (ref 34–46.6)
HDLC SERPL-MCNC: 59 MG/DL
HGB BLD-MCNC: 13.6 G/DL (ref 11.1–15.9)
HGB UR QL STRIP: NEGATIVE
IMM GRANULOCYTES # BLD AUTO: 0 X10E3/UL (ref 0–0.1)
IMM GRANULOCYTES NFR BLD AUTO: 0 %
KETONES UR QL STRIP: NEGATIVE
LDLC SERPL CALC-MCNC: 106 MG/DL (ref 0–99)
LEUKOCYTE ESTERASE UR QL STRIP: NEGATIVE
LYMPHOCYTES # BLD AUTO: 2.2 X10E3/UL (ref 0.7–3.1)
LYMPHOCYTES NFR BLD AUTO: 39 %
MCH RBC QN AUTO: 28.6 PG (ref 26.6–33)
MCHC RBC AUTO-ENTMCNC: 32.8 G/DL (ref 31.5–35.7)
MCV RBC AUTO: 87 FL (ref 79–97)
MICRO URNS: ABNORMAL
MICRO URNS: ABNORMAL
MICROALBUMIN UR-MCNC: 10.3 UG/ML
MONOCYTES # BLD AUTO: 0.4 X10E3/UL (ref 0.1–0.9)
MONOCYTES NFR BLD AUTO: 6 %
MUCOUS THREADS URNS QL MICRO: PRESENT
NEUTROPHILS # BLD AUTO: 2.9 X10E3/UL (ref 1.4–7)
NEUTROPHILS NFR BLD AUTO: 53 %
NITRITE UR QL STRIP: NEGATIVE
PH UR STRIP: 7.5 [PH] (ref 5–7.5)
PLATELET # BLD AUTO: 273 X10E3/UL (ref 150–450)
POTASSIUM SERPL-SCNC: 4.1 MMOL/L (ref 3.5–5.2)
PROT SERPL-MCNC: 6.6 G/DL (ref 6–8.5)
PROT UR QL STRIP: NEGATIVE
RBC # BLD AUTO: 4.75 X10E6/UL (ref 3.77–5.28)
RBC #/AREA URNS HPF: NORMAL /HPF (ref 0–2)
SODIUM SERPL-SCNC: 140 MMOL/L (ref 134–144)
SP GR UR: 1.02 (ref 1–1.03)
T4 FREE SERPL-MCNC: 0.64 NG/DL (ref 0.82–1.77)
TRIGL SERPL-MCNC: 153 MG/DL (ref 0–149)
TSH SERPL DL<=0.005 MIU/L-ACNC: 3.48 UIU/ML (ref 0.45–4.5)
URINALYSIS REFLEX: ABNORMAL
UROBILINOGEN UR STRIP-MCNC: 0.2 MG/DL (ref 0.2–1)
VLDLC SERPL CALC-MCNC: 31 MG/DL (ref 5–40)
WBC # BLD AUTO: 5.6 X10E3/UL (ref 3.4–10.8)
WBC #/AREA URNS HPF: NORMAL /HPF (ref 0–5)

## 2020-03-09 ENCOUNTER — TELEPHONE (OUTPATIENT)
Dept: FAMILY MEDICINE | Facility: CLINIC | Age: 55
End: 2020-03-09

## 2020-03-09 NOTE — TELEPHONE ENCOUNTER
From overdue results-lab due prior to ov. Spoke to patient that fasting lab is due prior to ov. States she had it drawn Saturday.

## 2020-03-16 ENCOUNTER — TELEPHONE (OUTPATIENT)
Dept: FAMILY MEDICINE | Facility: CLINIC | Age: 55
End: 2020-03-16

## 2020-03-16 NOTE — TELEPHONE ENCOUNTER
----- Message from Renata Ramon sent at 3/16/2020  8:30 AM CDT -----  Called Ms. Lemus to reschedule but does has a question about thyroid meds.    Thanks,  Renata

## 2020-03-16 NOTE — TELEPHONE ENCOUNTER
Had labs drawn last Saturday at [facility]. Wanting to know results & if she needs to stay on same dose of thyroid med since it was adjusted at last visit.    *Shae Can you request from [facility]? Labs not showing up in chart.

## 2020-03-17 NOTE — TELEPHONE ENCOUNTER
Spoke to pt. Advised per Julienne, labs look good. T4 slightly decreased but will monitor for now.

## 2020-03-26 ENCOUNTER — TELEPHONE (OUTPATIENT)
Dept: FAMILY MEDICINE | Facility: CLINIC | Age: 55
End: 2020-03-26

## 2020-03-26 DIAGNOSIS — Z78.0 MENOPAUSE: Primary | ICD-10-CM

## 2020-03-26 RX ORDER — ESTRADIOL 2 MG/1
2 TABLET ORAL NIGHTLY
Qty: 30 TABLET | Refills: 5 | Status: SHIPPED | OUTPATIENT
Start: 2020-03-26 | End: 2020-09-28 | Stop reason: SDUPTHER

## 2020-03-26 NOTE — TELEPHONE ENCOUNTER
Told patient that Julienne refilled. Said she will discuss mammogram at visit. Said she had some things going on last year and will get done this summer. Said that she has [facility] [place] but it doesn't always work. Sent her the link to reactivate, looks like her status is pending. She will get [facility] working then call to set up virtual visit.

## 2020-03-26 NOTE — TELEPHONE ENCOUNTER
Spoke to patient with results verbatim per Julienne. Verbalized understanding on all. Said she has 7 Estradiol left. Can you refill to [facility]? If so, Please send in. If any issues with this, please send back to Shae to call pt. Otherwise sign encounter when done.

## 2020-03-26 NOTE — PROGRESS NOTES
Spoke to patient with results verbatim per Julienne. Verbalized understanding on all. Said she has 7 Estradiol left. Can you refill to [facility]? If so, Please send in. If any issues with this, please send back to Shae to call pt. Otherwise sign encounter when done.

## 2020-03-26 NOTE — TELEPHONE ENCOUNTER
----- Message from Julienne Damon NP sent at 3/26/2020 12:26 PM CDT -----  Your thyroid stimulating hormone is normal. Your t4 is low at this time. We will discuss this further at your upcoming office visit. The rest of your labs are normal.

## 2020-03-26 NOTE — TELEPHONE ENCOUNTER
Yes I will resend. When was her last mammogram??? Looks like chart says 2018. Will need to do that this summer. Does she want to change her visit on the 6th to a virtual visit?

## 2020-04-23 RX ORDER — THYROID, PORCINE 90 MG/1
90 TABLET ORAL
COMMUNITY
Start: 2020-01-18 | End: 2020-12-07 | Stop reason: SDUPTHER

## 2020-04-27 ENCOUNTER — OFFICE VISIT (OUTPATIENT)
Dept: FAMILY MEDICINE | Facility: CLINIC | Age: 55
End: 2020-04-27
Payer: COMMERCIAL

## 2020-04-27 DIAGNOSIS — E78.5 HYPERLIPIDEMIA, UNSPECIFIED HYPERLIPIDEMIA TYPE: ICD-10-CM

## 2020-04-27 DIAGNOSIS — E03.9 PRIMARY HYPOTHYROIDISM: Primary | ICD-10-CM

## 2020-04-27 DIAGNOSIS — E03.9 HYPOTHYROIDISM, UNSPECIFIED TYPE: ICD-10-CM

## 2020-04-27 DIAGNOSIS — I10 HYPERTENSION, UNSPECIFIED TYPE: ICD-10-CM

## 2020-04-27 DIAGNOSIS — Z95.0 PACEMAKER: ICD-10-CM

## 2020-04-27 PROCEDURE — 99213 OFFICE O/P EST LOW 20 MIN: CPT | Mod: 95,,, | Performed by: NURSE PRACTITIONER

## 2020-04-27 PROCEDURE — 99213 PR OFFICE/OUTPT VISIT, EST, LEVL III, 20-29 MIN: ICD-10-PCS | Mod: 95,,, | Performed by: NURSE PRACTITIONER

## 2020-04-27 NOTE — PROGRESS NOTES
Subjective:        The chief complaint leading to consultation is: check up  The patient location is:  Home  Visit type: Virtual visit with synchronous audio/video or audio only  This was a video visit in lieu of in-person visit due to the coronavirus emergency. Patient acknowledged and consented to the video visit encounter.     54 year old presents for virtual visit. Reports that saw Dr. Her in February. At that time taken off of metoprolol due to intermittent episodes of dizziness. Since stopping patient reports feeling so much better. bp readings have been <130/90. No nausea. Still working. Does not need refills. Has follow up scheduled in may with Dr. her      Past Surgical History:   Procedure Laterality Date    ARTHROSCOPY OF KNEE Left 8/21/2019    Procedure: ARTHROSCOPY, KNEE;  Surgeon: Porter Cifuentes MD;  Location: [facility];  Service: Orthopedics;  Laterality: Left;    ATRIAL CARDIAC PACEMAKER INSERTION      EXCISION OF MEDIAL MENISCUS OF KNEE Left 8/21/2019    Procedure: MENISCECTOMY, KNEE,  partial,MEDIAL,lateral;  Surgeon: Porter Cifuentes MD;  Location: [facility];  Service: Orthopedics;  Laterality: Left;    HYSTERECTOMY  1999    INSERTION OF PACEMAKER Left 12/2014    KNEE ARTHROSCOPY      left shoulder      SURGICAL REMOVAL OF BONE SPUR Left 2016    Removed from leftr shoulder     Past Medical History:   Diagnosis Date    Bradycardia     Hypertension     Thyroid disease      Family History   Problem Relation Age of Onset    Diabetes Mother     No Known Problems Father     Heart disease Maternal Aunt     Diabetes Maternal Grandmother     Diabetes Maternal Grandfather         Social History:   Marital Status:   Alcohol History:  reports that she drinks alcohol.  Tobacco History:  reports that she quit smoking about 25 years ago. She started smoking about 35 years ago. She has a 5.00 pack-year smoking history. She has never used smokeless tobacco.  Drug History:  reports that  she has current or past drug history.    Review of patient's allergies indicates:   Allergen Reactions    Comtrex Other (See Comments)       Current Outpatient Medications   Medication Sig Dispense Refill    ARMOUR THYROID 60 mg Tab Take 1 tablet by mouth every Tuesday, Thursday, Saturday, Sunday.  11    ARMOUR THYROID 90 mg Tab Take 90 mg by mouth every Mon, Wed, Fri.      ascorbic acid, vitamin C, (VITAMIN C) 500 MG tablet Take 500 mg by mouth every evening.       estradioL (ESTRACE) 2 MG tablet Take 1 tablet (2 mg total) by mouth every evening. 30 tablet 5    KRILL OIL ORAL Take 1,000 mg by mouth every Mon, Wed, Fri.       metoprolol succinate (TOPROL-XL) 25 MG 24 hr tablet Take 0.5 tablets (12.5 mg total) by mouth every evening. (Patient taking differently: Take 25 mg by mouth every evening. )  3    multivitamin (THERAGRAN) per tablet Take 0.5 capsules by mouth every evening.       olmesartan (BENICAR) 20 MG tablet Take 20 mg by mouth once daily.   11    potassium gluconate 595 mg (99 mg) Tab Take 1 tablet by mouth every evening.      vitamin D (VITAMIN D3) 1000 units Tab Take 1,000 Units by mouth every evening.        No current facility-administered medications for this visit.        Review of Systems   Constitutional: Negative for chills, fever and unexpected weight change.   HENT: Negative for ear pain, rhinorrhea and sore throat.    Eyes: Negative for pain and visual disturbance.   Respiratory: Negative for cough and shortness of breath.    Cardiovascular: Negative for chest pain, palpitations and leg swelling.   Gastrointestinal: Negative for abdominal pain, diarrhea, nausea and vomiting.   Genitourinary: Negative for difficulty urinating, hematuria and vaginal bleeding.   Musculoskeletal: Negative for arthralgias.   Skin: Negative for rash.   Neurological: Negative for dizziness, weakness and headaches.   Psychiatric/Behavioral: Negative for agitation and sleep disturbance. The patient is not  nervous/anxious.          Objective:        Physical Exam:   Physical Exam   Constitutional: She appears well-developed and well-nourished. No distress.            Assessment:       1. Primary hypothyroidism    2. Hypertension, unspecified type    3. Hypothyroidism, unspecified type    4. Pacemaker    5. Hyperlipidemia, unspecified hyperlipidemia type      Plan:   Primary hypothyroidism    Hypertension, unspecified type    Hypothyroidism, unspecified type  Comments:  will repeat labs in 6 weeks and adjust medication accordingly    Pacemaker    Hyperlipidemia, unspecified hyperlipidemia type      Follow up in about 6 months (around 10/27/2020) for medication management.    Total time spent with patient: 15 min    Each patient to whom he or she provides medical services by telemedicine is:  (1) informed of the relationship between the physician and patient and the respective role of any other health care provider with respect to management of the patient; and (2) notified that he or she may decline to receive medical services by telemedicine and may withdraw from such care at any time.    This note was created using [employer] voice recognition software that occasionally misinterprets phrases or words.

## 2020-05-01 ENCOUNTER — TELEPHONE (OUTPATIENT)
Dept: FAMILY MEDICINE | Facility: CLINIC | Age: 55
End: 2020-05-01

## 2020-06-05 DIAGNOSIS — Z12.31 ENCOUNTER FOR SCREENING MAMMOGRAM FOR MALIGNANT NEOPLASM OF BREAST: Primary | ICD-10-CM

## 2020-06-12 ENCOUNTER — HOSPITAL ENCOUNTER (OUTPATIENT)
Dept: RADIOLOGY | Facility: HOSPITAL | Age: 55
Discharge: HOME | End: 2020-06-12
Attending: FAMILY MEDICINE
Payer: COMMERCIAL

## 2020-06-12 VITALS — BODY MASS INDEX: 28.91 KG/M2 | HEIGHT: 70 IN | WEIGHT: 201.94 LBS

## 2020-06-12 DIAGNOSIS — Z12.31 ENCOUNTER FOR SCREENING MAMMOGRAM FOR MALIGNANT NEOPLASM OF BREAST: ICD-10-CM

## 2020-06-12 PROCEDURE — 77067 SCR MAMMO BI INCL CAD: CPT | Mod: TC,PO

## 2020-06-16 ENCOUNTER — TELEPHONE (OUTPATIENT)
Dept: FAMILY MEDICINE | Facility: CLINIC | Age: 55
End: 2020-06-16

## 2020-06-16 NOTE — TELEPHONE ENCOUNTER
----- Message from Jacquelin Triana sent at 6/16/2020 11:23 AM CDT -----  Regarding: Returning call  Contact: Mariah Ashton  Pt is returning Shae Salinas call, please call her back on # [phone]

## 2020-06-16 NOTE — TELEPHONE ENCOUNTER
----- Message from Jed Haque MD sent at 6/14/2020  6:48 PM CDT -----  Call patient.  Mammogram was normal.  Repeat mammogram in 1 year.

## 2020-06-30 ENCOUNTER — TELEPHONE (OUTPATIENT)
Dept: FAMILY MEDICINE | Facility: CLINIC | Age: 55
End: 2020-06-30

## 2020-06-30 DIAGNOSIS — Z00.00 ROUTINE GENERAL MEDICAL EXAMINATION AT A HEALTH CARE FACILITY: Primary | ICD-10-CM

## 2020-06-30 DIAGNOSIS — E03.9 HYPOTHYROIDISM, UNSPECIFIED TYPE: ICD-10-CM

## 2020-06-30 DIAGNOSIS — I10 HYPERTENSION, UNSPECIFIED TYPE: ICD-10-CM

## 2020-06-30 DIAGNOSIS — Z79.899 ENCOUNTER FOR LONG-TERM (CURRENT) USE OF OTHER MEDICATIONS: ICD-10-CM

## 2020-07-09 ENCOUNTER — TELEPHONE (OUTPATIENT)
Dept: FAMILY MEDICINE | Facility: CLINIC | Age: 55
End: 2020-07-09

## 2020-09-28 DIAGNOSIS — Z78.0 MENOPAUSE: ICD-10-CM

## 2020-09-28 RX ORDER — ESTRADIOL 2 MG/1
2 TABLET ORAL NIGHTLY
Qty: 90 TABLET | Refills: 1 | Status: SHIPPED | OUTPATIENT
Start: 2020-09-28 | End: 2020-12-07 | Stop reason: SDUPTHER

## 2020-09-28 RX ORDER — SULFAMETHOXAZOLE AND TRIMETHOPRIM 800; 160 MG/1; MG/1
60 TABLET ORAL
Qty: 48 TABLET | Refills: 1 | Status: SHIPPED | OUTPATIENT
Start: 2020-09-29 | End: 2020-12-07 | Stop reason: SDUPTHER

## 2020-09-28 NOTE — TELEPHONE ENCOUNTER
----- Message from Lexy Sarah sent at 9/28/2020  9:17 AM CDT -----  Refill for Estradiol qty 90, [place] Thyroid, only the 60 mg. [facility] 90[facility] Pt #[phone]

## 2020-11-05 ENCOUNTER — OFFICE VISIT (OUTPATIENT)
Dept: FAMILY MEDICINE | Facility: CLINIC | Age: 55
End: 2020-11-05
Payer: COMMERCIAL

## 2020-11-05 VITALS
HEIGHT: 70 IN | BODY MASS INDEX: 28.49 KG/M2 | TEMPERATURE: 98 F | WEIGHT: 199 LBS | SYSTOLIC BLOOD PRESSURE: 126 MMHG | DIASTOLIC BLOOD PRESSURE: 84 MMHG | HEART RATE: 74 BPM

## 2020-11-05 DIAGNOSIS — Z79.899 ENCOUNTER FOR LONG-TERM (CURRENT) USE OF MEDICATIONS: ICD-10-CM

## 2020-11-05 DIAGNOSIS — I10 HYPERTENSION, UNSPECIFIED TYPE: ICD-10-CM

## 2020-11-05 DIAGNOSIS — Z78.0 MENOPAUSE: ICD-10-CM

## 2020-11-05 DIAGNOSIS — Z12.11 COLON CANCER SCREENING: Primary | ICD-10-CM

## 2020-11-05 DIAGNOSIS — Z95.0 PACEMAKER: ICD-10-CM

## 2020-11-05 DIAGNOSIS — E03.9 HYPOTHYROIDISM, UNSPECIFIED TYPE: ICD-10-CM

## 2020-11-05 DIAGNOSIS — E78.5 HYPERLIPIDEMIA, UNSPECIFIED HYPERLIPIDEMIA TYPE: ICD-10-CM

## 2020-11-05 PROCEDURE — 99214 OFFICE O/P EST MOD 30 MIN: CPT | Mod: S$GLB,,, | Performed by: NURSE PRACTITIONER

## 2020-11-05 PROCEDURE — 99214 PR OFFICE/OUTPT VISIT, EST, LEVL IV, 30-39 MIN: ICD-10-PCS | Mod: S$GLB,,, | Performed by: NURSE PRACTITIONER

## 2020-11-05 RX ORDER — METOPROLOL SUCCINATE 25 MG/1
25 TABLET, EXTENDED RELEASE ORAL DAILY
COMMUNITY

## 2020-11-05 NOTE — PROGRESS NOTES
SUBJECTIVE:    Patient ID: Mariah Ashton is a 55 y.o. female.    Chief Complaint: Follow-up (6 month//list of meds//rx needed//stool kit//refused flu shot tb )      54 year old presents for check up. wouldt. Reports that saw Dr. Lynn in July.  At that time taken off of metoprolol due to intermittent episodes of dizziness. Since stopping patient reports feeling so much better. bp readings have been <130/90. No nausea. Still working. Does not need refills. Plans to have labs done this week. Last mammogram this year. Sleeping great. Would like to discuss hormones.       Telephone on 06/30/2020   Component Date Value Ref Range Status    WBC 11/07/2020 6.5  3.4 - 10.8 x10E3/uL Final    RBC 11/07/2020 4.76  3.77 - 5.28 x10E6/uL Final    Hemoglobin 11/07/2020 14.3  11.1 - 15.9 g/dL Final    Hematocrit 11/07/2020 42.5  34.0 - 46.6 % Final    MCV 11/07/2020 89  79 - 97 fL Final    MCH 11/07/2020 30.0  26.6 - 33.0 pg Final    MCHC 11/07/2020 33.6  31.5 - 35.7 g/dL Final    RDW 11/07/2020 12.4  11.7 - 15.4 % Final    Platelets 11/07/2020 288  150 - 450 x10E3/uL Final    Neutrophils 11/07/2020 63  Not Estab. % Final    Lymphs 11/07/2020 27  Not Estab. % Final    Monocytes 11/07/2020 7  Not Estab. % Final    Eos 11/07/2020 2  Not Estab. % Final    Basos 11/07/2020 1  Not Estab. % Final    Neutrophils (Absolute) 11/07/2020 4.1  1.4 - 7.0 x10E3/uL Final    Lymphs (Absolute) 11/07/2020 1.8  0.7 - 3.1 x10E3/uL Final    Monocytes(Absolute) 11/07/2020 0.4  0.1 - 0.9 x10E3/uL Final    Eos (Absolute) 11/07/2020 0.2  0.0 - 0.4 x10E3/uL Final    Baso (Absolute) 11/07/2020 0.1  0.0 - 0.2 x10E3/uL Final    Immature Granulocytes 11/07/2020 0  Not Estab. % Final    Immature Grans (Abs) 11/07/2020 0.0  0.0 - 0.1 x10E3/uL Final    Cholesterol 11/07/2020 221* 100 - 199 mg/dL Final    Triglycerides 11/07/2020 239* 0 - 149 mg/dL Final    HDL 11/07/2020 59  >39 mg/dL Final    VLDL Cholesterol Bobby 11/07/2020 42* 5 - 40  mg/dL Final    LDL Calculated 11/07/2020 120* 0 - 99 mg/dL Final    TSH 11/07/2020 4.160  0.450 - 4.500 uIU/mL Final    Specific [place], UA 11/07/2020 1.021  1.005 - 1.030 Final    pH, UA 11/07/2020 7.5  5.0 - 7.5 Final    Color, UA 11/07/2020 Yellow  Yellow Final    Clarity, UA 11/07/2020 Clear  Clear Final    Leukocytes, UA 11/07/2020 Negative  Negative Final    Protein, UA 11/07/2020 Trace  Negative/Trace Final    Glucose, UA 11/07/2020 Negative  Negative Final    Ketones, UA 11/07/2020 Negative  Negative Final    Occult Blood UA 11/07/2020 Negative  Negative Final    Bilirubin, UA 11/07/2020 Negative  Negative Final    Urobilinogen, UA 11/07/2020 0.2  0.2 - 1.0 mg/dL Final    Nitrite, UA 11/07/2020 Negative  Negative Final    Microscopic Examination 11/07/2020 Comment   Final    Microscopic Examination 11/07/2020 See below:   Final    Urinalysis Reflex 11/07/2020 Comment   Final    Glucose 11/07/2020 86  65 - 99 mg/dL Final    BUN 11/07/2020 15  6 - 24 mg/dL Final    Creatinine 11/07/2020 0.60  0.57 - 1.00 mg/dL Final    eGFR if non African American 11/07/2020 102  >59 mL/min/1.73 Final    eGFR if African American 11/07/2020 118  >59 mL/min/1.73 Final    BUN/Creatinine Ratio 11/07/2020 25* 9 - 23 Final    Sodium 11/07/2020 138  134 - 144 mmol/L Final    Potassium 11/07/2020 4.4  3.5 - 5.2 mmol/L Final    Chloride 11/07/2020 99  96 - 106 mmol/L Final    CO2 11/07/2020 25  20 - 29 mmol/L Final    Calcium 11/07/2020 9.4  8.7 - 10.2 mg/dL Final    Protein, Total 11/07/2020 6.8  6.0 - 8.5 g/dL Final    Albumin 11/07/2020 4.1  3.8 - 4.9 g/dL Final    Globulin, Total 11/07/2020 2.7  1.5 - 4.5 g/dL Final    Albumin/Globulin Ratio 11/07/2020 1.5  1.2 - 2.2 Final    Total Bilirubin 11/07/2020 0.3  0.0 - 1.2 mg/dL Final    Alkaline Phosphatase 11/07/2020 75  39 - 117 IU/L Final    AST 11/07/2020 16  0 - 40 IU/L Final    ALT 11/07/2020 9  0 - 32 IU/L Final    WBC, UA 11/07/2020 0-5  0 -  5 /hpf Final    RBC, UA 2020 0-2  0 - 2 /hpf Final    Epithelial Cells (non renal) 2020 0-10  0 - 10 /hpf Final    Mucus, UA 2020 Present  Not Estab. Final    Bacteria, UA 2020 Few  None seen/Few Final       Past Medical History:   Diagnosis Date    Bradycardia     Hypertension     Thyroid disease      Social History     Socioeconomic History    Marital status:      Spouse name: Not on file    Number of children: Not on file    Years of education: Not on file    Highest education level: Not on file   Occupational History    Not on file   Social Needs    Financial resource strain: Not on file    Food insecurity     Worry: Not on file     Inability: Not on file    Transportation needs     Medical: Not on file     Non-medical: Not on file   Tobacco Use    Smoking status: Former Smoker     Packs/day: 0.50     Years: 10.00     Pack years: 5.00     Start date:      Quit date:      Years since quittin.8    Smokeless tobacco: Never Used   Substance and Sexual Activity    Alcohol use: Yes     Comment: occassional    Drug use: Not Currently    Sexual activity: Not on file   Lifestyle    Physical activity     Days per week: Not on file     Minutes per session: Not on file    Stress: Not on file   Relationships    Social connections     Talks on phone: Not on file     Gets together: Not on file     Attends [employer] service: Not on file     Active member of club or organization: Not on file     Attends meetings of clubs or organizations: Not on file     Relationship status: Not on file   Other Topics Concern    Not on file   Social History Narrative    Not on file     Past Surgical History:   Procedure Laterality Date    ARTHROSCOPY OF KNEE Left 2019    Procedure: ARTHROSCOPY, KNEE;  Surgeon: Porter Cifuentes MD;  Location: [facility];  Service: Orthopedics;  Laterality: Left;    ATRIAL CARDIAC PACEMAKER INSERTION      EXCISION OF MEDIAL MENISCUS OF KNEE  Left 8/21/2019    Procedure: MENISCECTOMY, KNEE,  partial,MEDIAL,lateral;  Surgeon: Porter Cifuentes MD;  Location: [facility];  Service: Orthopedics;  Laterality: Left;    HYSTERECTOMY  1999    INSERTION OF PACEMAKER Left 12/2014    KNEE ARTHROSCOPY      left shoulder      SURGICAL REMOVAL OF BONE SPUR Left 2016    Removed from leftr shoulder     Family History   Problem Relation Age of Onset    Diabetes Mother     No Known Problems Father     Heart disease Maternal Aunt     Diabetes Maternal Grandmother     Breast cancer Maternal Grandmother     Diabetes Maternal Grandfather        Review of patient's allergies indicates:   Allergen Reactions    Comtrex Other (See Comments)       Current Outpatient Medications:     ARMOUR THYROID 60 mg Tab, Take 1 tablet (60 mg total) by mouth every Tuesday, Thursday, Saturday, Sunday., Disp: 48 tablet, Rfl: 1    ARMOUR THYROID 90 mg Tab, Take 90 mg by mouth every Mon, Wed, Fri., Disp: , Rfl:     ascorbic acid, vitamin C, (VITAMIN C) 500 MG tablet, Take 500 mg by mouth every evening. , Disp: , Rfl:     estradioL (ESTRACE) 2 MG tablet, Take 1 tablet (2 mg total) by mouth every evening., Disp: 90 tablet, Rfl: 1    KRILL OIL ORAL, Take 1,000 mg by mouth every Mon, Wed, Fri. , Disp: , Rfl:     metoprolol succinate (TOPROL-XL) 25 MG 24 hr tablet, Take 25 mg by mouth once daily., Disp: , Rfl:     multivitamin (THERAGRAN) per tablet, Take 0.5 capsules by mouth every evening. , Disp: , Rfl:     potassium gluconate 595 mg (99 mg) Tab, Take 1 tablet by mouth every evening., Disp: , Rfl:     vitamin D (VITAMIN D3) 1000 units Tab, Take 1,000 Units by mouth every evening. , Disp: , Rfl:     Review of Systems   Constitutional: Negative for chills, fever and unexpected weight change.   HENT: Negative for ear pain, rhinorrhea and sore throat.    Eyes: Negative for pain and visual disturbance.   Respiratory: Negative for cough and shortness of breath.    Cardiovascular: Negative for  "chest pain, palpitations and leg swelling.   Gastrointestinal: Negative for abdominal pain, diarrhea, nausea and vomiting.   Genitourinary: Negative for difficulty urinating, hematuria and vaginal bleeding.   Musculoskeletal: Negative for arthralgias.   Skin: Negative for rash.   Neurological: Negative for dizziness, weakness and headaches.   Psychiatric/Behavioral: Negative for agitation and sleep disturbance. The patient is not nervous/anxious.           Objective:      Vitals:    11/05/20 0952   BP: 126/84   Pulse: 74   Temp: 97.6 °F (36.4 °C)   Weight: 90.3 kg (199 lb)   Height: 5' 9.5" (1.765 m)     Physical Exam  Constitutional:       Appearance: She is well-developed.   HENT:      Right Ear: External ear normal.      Left Ear: External ear normal.   Eyes:      Conjunctiva/sclera: Conjunctivae normal.      Pupils: Pupils are equal, round, and reactive to light.   Neck:      Musculoskeletal: Normal range of motion and neck supple.      Vascular: No JVD.   Cardiovascular:      Rate and Rhythm: Normal rate and regular rhythm.      Heart sounds: No murmur.   Pulmonary:      Effort: Pulmonary effort is normal.      Breath sounds: Normal breath sounds.   Abdominal:      General: Bowel sounds are normal.      Palpations: Abdomen is soft.   Musculoskeletal: Normal range of motion.         General: No deformity.   Lymphadenopathy:      Cervical: No cervical adenopathy.   Skin:     General: Skin is warm and dry.      Findings: No rash.   Neurological:      Mental Status: She is alert and oriented to person, place, and time.      Gait: Gait normal.   Psychiatric:         Speech: Speech normal.         Behavior: Behavior normal.           Assessment:       1. Colon cancer screening    2. Hypertension, unspecified type    3. Hypothyroidism, unspecified type    4. Pacemaker    5. Hyperlipidemia, unspecified hyperlipidemia type    6. Menopause    7. Encounter for long-term (current) use of medications         Plan:     "   Colon cancer screening  -     Occult Blood, Fecal Immunoassay; Future; Expected date: 11/05/2020    Hypertension, unspecified type    Hypothyroidism, unspecified type    Pacemaker    Hyperlipidemia, unspecified hyperlipidemia type    Menopause    Encounter for long-term (current) use of medications      Follow up in about 6 months (around 5/5/2021).        11/8/2020 Julienne Damon

## 2020-11-08 LAB
ALBUMIN SERPL-MCNC: 4.1 G/DL (ref 3.8–4.9)
ALBUMIN/GLOB SERPL: 1.5 {RATIO} (ref 1.2–2.2)
ALP SERPL-CCNC: 75 IU/L (ref 39–117)
ALT SERPL-CCNC: 9 IU/L (ref 0–32)
APPEARANCE UR: CLEAR
AST SERPL-CCNC: 16 IU/L (ref 0–40)
BACTERIA #/AREA URNS HPF: NORMAL /[HPF]
BASOPHILS # BLD AUTO: 0.1 X10E3/UL (ref 0–0.2)
BASOPHILS NFR BLD AUTO: 1 %
BILIRUB SERPL-MCNC: 0.3 MG/DL (ref 0–1.2)
BILIRUB UR QL STRIP: NEGATIVE
BUN SERPL-MCNC: 15 MG/DL (ref 6–24)
BUN/CREAT SERPL: 25 (ref 9–23)
CALCIUM SERPL-MCNC: 9.4 MG/DL (ref 8.7–10.2)
CHLORIDE SERPL-SCNC: 99 MMOL/L (ref 96–106)
CHOLEST SERPL-MCNC: 221 MG/DL (ref 100–199)
CO2 SERPL-SCNC: 25 MMOL/L (ref 20–29)
COLOR UR: YELLOW
CREAT SERPL-MCNC: 0.6 MG/DL (ref 0.57–1)
EOSINOPHIL # BLD AUTO: 0.2 X10E3/UL (ref 0–0.4)
EOSINOPHIL NFR BLD AUTO: 2 %
EPI CELLS #/AREA URNS HPF: NORMAL /HPF (ref 0–10)
ERYTHROCYTE [DISTWIDTH] IN BLOOD BY AUTOMATED COUNT: 12.4 % (ref 11.7–15.4)
GLOBULIN SER CALC-MCNC: 2.7 G/DL (ref 1.5–4.5)
GLUCOSE SERPL-MCNC: 86 MG/DL (ref 65–99)
GLUCOSE UR QL: NEGATIVE
HCT VFR BLD AUTO: 42.5 % (ref 34–46.6)
HDLC SERPL-MCNC: 59 MG/DL
HGB BLD-MCNC: 14.3 G/DL (ref 11.1–15.9)
HGB UR QL STRIP: NEGATIVE
IMM GRANULOCYTES # BLD AUTO: 0 X10E3/UL (ref 0–0.1)
IMM GRANULOCYTES NFR BLD AUTO: 0 %
KETONES UR QL STRIP: NEGATIVE
LDLC SERPL CALC-MCNC: 120 MG/DL (ref 0–99)
LEUKOCYTE ESTERASE UR QL STRIP: NEGATIVE
LYMPHOCYTES # BLD AUTO: 1.8 X10E3/UL (ref 0.7–3.1)
LYMPHOCYTES NFR BLD AUTO: 27 %
MCH RBC QN AUTO: 30 PG (ref 26.6–33)
MCHC RBC AUTO-ENTMCNC: 33.6 G/DL (ref 31.5–35.7)
MCV RBC AUTO: 89 FL (ref 79–97)
MICRO URNS: NORMAL
MICRO URNS: NORMAL
MONOCYTES # BLD AUTO: 0.4 X10E3/UL (ref 0.1–0.9)
MONOCYTES NFR BLD AUTO: 7 %
MUCOUS THREADS URNS QL MICRO: PRESENT
NEUTROPHILS # BLD AUTO: 4.1 X10E3/UL (ref 1.4–7)
NEUTROPHILS NFR BLD AUTO: 63 %
NITRITE UR QL STRIP: NEGATIVE
PH UR STRIP: 7.5 [PH] (ref 5–7.5)
PLATELET # BLD AUTO: 288 X10E3/UL (ref 150–450)
POTASSIUM SERPL-SCNC: 4.4 MMOL/L (ref 3.5–5.2)
PROT SERPL-MCNC: 6.8 G/DL (ref 6–8.5)
PROT UR QL STRIP: NORMAL
RBC # BLD AUTO: 4.76 X10E6/UL (ref 3.77–5.28)
RBC #/AREA URNS HPF: NORMAL /HPF (ref 0–2)
SODIUM SERPL-SCNC: 138 MMOL/L (ref 134–144)
SP GR UR: 1.02 (ref 1–1.03)
TRIGL SERPL-MCNC: 239 MG/DL (ref 0–149)
TSH SERPL DL<=0.005 MIU/L-ACNC: 4.16 UIU/ML (ref 0.45–4.5)
URINALYSIS REFLEX: NORMAL
UROBILINOGEN UR STRIP-MCNC: 0.2 MG/DL (ref 0.2–1)
VLDLC SERPL CALC-MCNC: 42 MG/DL (ref 5–40)
WBC # BLD AUTO: 6.5 X10E3/UL (ref 3.4–10.8)
WBC #/AREA URNS HPF: NORMAL /HPF (ref 0–5)

## 2020-11-10 ENCOUNTER — TELEPHONE (OUTPATIENT)
Dept: FAMILY MEDICINE | Facility: CLINIC | Age: 55
End: 2020-11-10

## 2020-11-10 NOTE — TELEPHONE ENCOUNTER
----- Message from Jed Haque MD sent at 11/8/2020  8:38 PM CST -----  Call patient.  CBC looks normal no anemia.  Sugar, kidneys, and liver all normal.  Cholesterol mildly elevated 221 and triglycerides mildly elevated 239.  Cut back on fast food and fried food.

## 2020-11-11 LAB — HEMOCCULT STL QL IA: NEGATIVE

## 2020-11-12 ENCOUNTER — TELEPHONE (OUTPATIENT)
Dept: FAMILY MEDICINE | Facility: CLINIC | Age: 55
End: 2020-11-12

## 2020-11-12 NOTE — TELEPHONE ENCOUNTER
----- Message from Julienne Damon NP sent at 11/12/2020  2:26 PM CST -----  Stool is negative for blood.

## 2020-12-07 DIAGNOSIS — Z78.0 MENOPAUSE: ICD-10-CM

## 2020-12-07 RX ORDER — ESTRADIOL 2 MG/1
2 TABLET ORAL NIGHTLY
Qty: 90 TABLET | Refills: 1 | Status: SHIPPED | OUTPATIENT
Start: 2020-12-07 | End: 2021-05-06 | Stop reason: SDUPTHER

## 2020-12-07 RX ORDER — SULFAMETHOXAZOLE AND TRIMETHOPRIM 800; 160 MG/1; MG/1
60 TABLET ORAL
Qty: 48 TABLET | Refills: 1 | Status: SHIPPED | OUTPATIENT
Start: 2020-12-08 | End: 2021-05-06 | Stop reason: SDUPTHER

## 2020-12-07 RX ORDER — THYROID, PORCINE 90 MG/1
90 TABLET ORAL
Qty: 36 TABLET | Refills: 1 | Status: SHIPPED | OUTPATIENT
Start: 2020-12-07 | End: 2021-03-07

## 2020-12-07 NOTE — TELEPHONE ENCOUNTER
----- Message from Neda Florence sent at 12/7/2020  9:26 AM CST -----  Pt calling for refills on Armor thyroid 60 mg and 90 mg and E[facility]adial [facility] to [facility] [address] [facility] with refills  # [phone]

## 2021-05-06 ENCOUNTER — OFFICE VISIT (OUTPATIENT)
Dept: FAMILY MEDICINE | Facility: CLINIC | Age: 56
End: 2021-05-06
Payer: COMMERCIAL

## 2021-05-06 VITALS
DIASTOLIC BLOOD PRESSURE: 88 MMHG | WEIGHT: 206 LBS | HEIGHT: 70 IN | HEART RATE: 68 BPM | SYSTOLIC BLOOD PRESSURE: 138 MMHG | BODY MASS INDEX: 29.49 KG/M2

## 2021-05-06 DIAGNOSIS — E78.5 HYPERLIPIDEMIA, UNSPECIFIED HYPERLIPIDEMIA TYPE: ICD-10-CM

## 2021-05-06 DIAGNOSIS — I10 HYPERTENSION, UNSPECIFIED TYPE: Primary | ICD-10-CM

## 2021-05-06 DIAGNOSIS — Z79.899 HIGH RISK MEDICATION USE: ICD-10-CM

## 2021-05-06 DIAGNOSIS — Z78.0 MENOPAUSE: ICD-10-CM

## 2021-05-06 DIAGNOSIS — Z95.0 PACEMAKER: ICD-10-CM

## 2021-05-06 DIAGNOSIS — M62.838 MUSCLE SPASM: ICD-10-CM

## 2021-05-06 DIAGNOSIS — F41.9 ANXIETY: ICD-10-CM

## 2021-05-06 DIAGNOSIS — E03.9 HYPOTHYROIDISM, UNSPECIFIED TYPE: ICD-10-CM

## 2021-05-06 PROCEDURE — 99214 OFFICE O/P EST MOD 30 MIN: CPT | Mod: S$GLB,,, | Performed by: NURSE PRACTITIONER

## 2021-05-06 PROCEDURE — 99214 PR OFFICE/OUTPT VISIT, EST, LEVL IV, 30-39 MIN: ICD-10-PCS | Mod: S$GLB,,, | Performed by: NURSE PRACTITIONER

## 2021-05-06 RX ORDER — ESTRADIOL 2 MG/1
2 TABLET ORAL NIGHTLY
Qty: 90 TABLET | Refills: 1 | Status: SHIPPED | OUTPATIENT
Start: 2021-05-06 | End: 2021-11-08 | Stop reason: SDUPTHER

## 2021-05-06 RX ORDER — MECLIZINE HYDROCHLORIDE 25 MG/1
25 TABLET ORAL 3 TIMES DAILY PRN
Qty: 30 TABLET | Refills: 3 | Status: SHIPPED | OUTPATIENT
Start: 2021-05-06

## 2021-05-06 RX ORDER — SULFAMETHOXAZOLE AND TRIMETHOPRIM 800; 160 MG/1; MG/1
60 TABLET ORAL
Qty: 48 TABLET | Refills: 1 | Status: SHIPPED | OUTPATIENT
Start: 2021-05-06 | End: 2021-11-08 | Stop reason: SDUPTHER

## 2021-05-06 RX ORDER — THYROID 90 MG/1
90 TABLET ORAL
Qty: 48 TABLET | Refills: 1 | Status: SHIPPED | OUTPATIENT
Start: 2021-05-06 | End: 2021-11-08 | Stop reason: SDUPTHER

## 2021-05-06 RX ORDER — THYROID 90 MG/1
TABLET ORAL
COMMUNITY
End: 2021-05-06 | Stop reason: SDUPTHER

## 2021-05-09 LAB
ALBUMIN SERPL-MCNC: 4.1 G/DL (ref 3.8–4.9)
ALBUMIN/CREAT UR: 17 MG/G CREAT (ref 0–29)
ALBUMIN/GLOB SERPL: 1.6 {RATIO} (ref 1.2–2.2)
ALP SERPL-CCNC: 75 IU/L (ref 39–117)
ALT SERPL-CCNC: 12 IU/L (ref 0–32)
APPEARANCE UR: CLEAR
AST SERPL-CCNC: 19 IU/L (ref 0–40)
BACTERIA #/AREA URNS HPF: ABNORMAL /[HPF]
BASOPHILS # BLD AUTO: 0 X10E3/UL (ref 0–0.2)
BASOPHILS NFR BLD AUTO: 1 %
BILIRUB SERPL-MCNC: 0.3 MG/DL (ref 0–1.2)
BILIRUB UR QL STRIP: NEGATIVE
BUN SERPL-MCNC: 13 MG/DL (ref 6–24)
BUN/CREAT SERPL: 24 (ref 9–23)
CALCIUM SERPL-MCNC: 9.4 MG/DL (ref 8.7–10.2)
CASTS URNS QL MICRO: ABNORMAL /LPF
CHLORIDE SERPL-SCNC: 100 MMOL/L (ref 96–106)
CHOLEST SERPL-MCNC: 203 MG/DL (ref 100–199)
CO2 SERPL-SCNC: 23 MMOL/L (ref 20–29)
COLOR UR: YELLOW
CREAT SERPL-MCNC: 0.54 MG/DL (ref 0.57–1)
CREAT UR-MCNC: 180.2 MG/DL
EOSINOPHIL # BLD AUTO: 0.2 X10E3/UL (ref 0–0.4)
EOSINOPHIL NFR BLD AUTO: 3 %
EPI CELLS #/AREA URNS HPF: ABNORMAL /HPF (ref 0–10)
ERYTHROCYTE [DISTWIDTH] IN BLOOD BY AUTOMATED COUNT: 12.9 % (ref 11.7–15.4)
GLOBULIN SER CALC-MCNC: 2.5 G/DL (ref 1.5–4.5)
GLUCOSE SERPL-MCNC: 84 MG/DL (ref 65–99)
GLUCOSE UR QL: NEGATIVE
HCT VFR BLD AUTO: 45.9 % (ref 34–46.6)
HDLC SERPL-MCNC: 58 MG/DL
HGB BLD-MCNC: 14.3 G/DL (ref 11.1–15.9)
HGB UR QL STRIP: NEGATIVE
IMM GRANULOCYTES # BLD AUTO: 0 X10E3/UL (ref 0–0.1)
IMM GRANULOCYTES NFR BLD AUTO: 0 %
KETONES UR QL STRIP: NEGATIVE
LDLC SERPL CALC-MCNC: 102 MG/DL (ref 0–99)
LEUKOCYTE ESTERASE UR QL STRIP: NEGATIVE
LYMPHOCYTES # BLD AUTO: 2.2 X10E3/UL (ref 0.7–3.1)
LYMPHOCYTES NFR BLD AUTO: 32 %
MAGNESIUM SERPL-MCNC: 2 MG/DL (ref 1.6–2.3)
MCH RBC QN AUTO: 28.1 PG (ref 26.6–33)
MCHC RBC AUTO-ENTMCNC: 31.2 G/DL (ref 31.5–35.7)
MCV RBC AUTO: 90 FL (ref 79–97)
MICRO URNS: ABNORMAL
MICROALBUMIN UR-MCNC: 30.5 UG/ML
MONOCYTES # BLD AUTO: 0.4 X10E3/UL (ref 0.1–0.9)
MONOCYTES NFR BLD AUTO: 6 %
NEUTROPHILS # BLD AUTO: 3.9 X10E3/UL (ref 1.4–7)
NEUTROPHILS NFR BLD AUTO: 58 %
NITRITE UR QL STRIP: NEGATIVE
PH UR STRIP: 7.5 [PH] (ref 5–7.5)
PLATELET # BLD AUTO: 319 X10E3/UL (ref 150–450)
POTASSIUM SERPL-SCNC: 4.3 MMOL/L (ref 3.5–5.2)
PROT SERPL-MCNC: 6.6 G/DL (ref 6–8.5)
PROT UR QL STRIP: ABNORMAL
RBC # BLD AUTO: 5.08 X10E6/UL (ref 3.77–5.28)
RBC #/AREA URNS HPF: ABNORMAL /HPF (ref 0–2)
SODIUM SERPL-SCNC: 140 MMOL/L (ref 134–144)
SP GR UR: 1.02 (ref 1–1.03)
TRIGL SERPL-MCNC: 256 MG/DL (ref 0–149)
TSH SERPL DL<=0.005 MIU/L-ACNC: 2.61 UIU/ML (ref 0.45–4.5)
URINALYSIS REFLEX: ABNORMAL
UROBILINOGEN UR STRIP-MCNC: 0.2 MG/DL (ref 0.2–1)
VLDLC SERPL CALC-MCNC: 43 MG/DL (ref 5–40)
WBC # BLD AUTO: 6.7 X10E3/UL (ref 3.4–10.8)
WBC #/AREA URNS HPF: ABNORMAL /HPF (ref 0–5)

## 2021-05-10 ENCOUNTER — TELEPHONE (OUTPATIENT)
Dept: FAMILY MEDICINE | Facility: CLINIC | Age: 56
End: 2021-05-10

## 2021-10-29 DIAGNOSIS — Z12.31 ENCOUNTER FOR SCREENING MAMMOGRAM FOR MALIGNANT NEOPLASM OF BREAST: Primary | ICD-10-CM

## 2021-11-08 ENCOUNTER — OFFICE VISIT (OUTPATIENT)
Dept: FAMILY MEDICINE | Facility: CLINIC | Age: 56
End: 2021-11-08
Payer: COMMERCIAL

## 2021-11-08 VITALS
BODY MASS INDEX: 27.25 KG/M2 | HEIGHT: 69 IN | SYSTOLIC BLOOD PRESSURE: 134 MMHG | WEIGHT: 184 LBS | HEART RATE: 84 BPM | DIASTOLIC BLOOD PRESSURE: 88 MMHG

## 2021-11-08 DIAGNOSIS — R42 VERTIGO: ICD-10-CM

## 2021-11-08 DIAGNOSIS — E03.9 HYPOTHYROIDISM, UNSPECIFIED TYPE: ICD-10-CM

## 2021-11-08 DIAGNOSIS — Z00.00 PHYSICAL EXAM: ICD-10-CM

## 2021-11-08 DIAGNOSIS — Z78.0 MENOPAUSE: ICD-10-CM

## 2021-11-08 DIAGNOSIS — Z95.0 PACEMAKER: ICD-10-CM

## 2021-11-08 DIAGNOSIS — Z79.899 HIGH RISK MEDICATION USE: ICD-10-CM

## 2021-11-08 DIAGNOSIS — E78.5 HYPERLIPIDEMIA, UNSPECIFIED HYPERLIPIDEMIA TYPE: ICD-10-CM

## 2021-11-08 DIAGNOSIS — Z12.11 SPECIAL SCREENING FOR MALIGNANT NEOPLASMS, COLON: Primary | ICD-10-CM

## 2021-11-08 DIAGNOSIS — I10 HYPERTENSION, UNSPECIFIED TYPE: ICD-10-CM

## 2021-11-08 PROCEDURE — 99214 PR OFFICE/OUTPT VISIT, EST, LEVL IV, 30-39 MIN: ICD-10-PCS | Mod: S$GLB,,, | Performed by: NURSE PRACTITIONER

## 2021-11-08 PROCEDURE — 99214 OFFICE O/P EST MOD 30 MIN: CPT | Mod: S$GLB,,, | Performed by: NURSE PRACTITIONER

## 2021-11-08 RX ORDER — ALPRAZOLAM 0.25 MG/1
TABLET ORAL
COMMUNITY
Start: 2021-10-14

## 2021-11-08 RX ORDER — ESTRADIOL 2 MG/1
2 TABLET ORAL NIGHTLY
Qty: 90 TABLET | Refills: 1 | Status: SHIPPED | OUTPATIENT
Start: 2021-11-08 | End: 2022-06-20 | Stop reason: SDUPTHER

## 2021-11-08 RX ORDER — SULFAMETHOXAZOLE AND TRIMETHOPRIM 800; 160 MG/1; MG/1
60 TABLET ORAL
Qty: 48 TABLET | Refills: 1 | Status: SHIPPED | OUTPATIENT
Start: 2021-11-09 | End: 2022-05-09 | Stop reason: SDUPTHER

## 2021-11-08 RX ORDER — THYROID 90 MG/1
90 TABLET ORAL
Qty: 48 TABLET | Refills: 1 | Status: SHIPPED | OUTPATIENT
Start: 2021-11-08 | End: 2022-05-09 | Stop reason: SDUPTHER

## 2021-11-15 ENCOUNTER — TELEPHONE (OUTPATIENT)
Dept: FAMILY MEDICINE | Facility: CLINIC | Age: 56
End: 2021-11-15
Payer: COMMERCIAL

## 2021-11-15 ENCOUNTER — HOSPITAL ENCOUNTER (OUTPATIENT)
Dept: RADIOLOGY | Facility: HOSPITAL | Age: 56
Discharge: HOME | End: 2021-11-15
Attending: NURSE PRACTITIONER
Payer: COMMERCIAL

## 2021-11-15 DIAGNOSIS — Z12.31 ENCOUNTER FOR SCREENING MAMMOGRAM FOR MALIGNANT NEOPLASM OF BREAST: ICD-10-CM

## 2021-11-15 PROCEDURE — 77067 SCR MAMMO BI INCL CAD: CPT | Mod: TC,PO

## 2021-11-16 LAB
ALBUMIN SERPL-MCNC: 4.1 G/DL (ref 3.8–4.9)
ALBUMIN/GLOB SERPL: 1.6 {RATIO} (ref 1.2–2.2)
ALP SERPL-CCNC: 92 IU/L (ref 44–121)
ALT SERPL-CCNC: 13 IU/L (ref 0–32)
AST SERPL-CCNC: 17 IU/L (ref 0–40)
BILIRUB SERPL-MCNC: <0.2 MG/DL (ref 0–1.2)
BUN SERPL-MCNC: 14 MG/DL (ref 6–24)
BUN/CREAT SERPL: 22 (ref 9–23)
CALCIUM SERPL-MCNC: 9.8 MG/DL (ref 8.7–10.2)
CHLORIDE SERPL-SCNC: 101 MMOL/L (ref 96–106)
CHOLEST SERPL-MCNC: 222 MG/DL (ref 100–199)
CO2 SERPL-SCNC: 25 MMOL/L (ref 20–29)
CREAT SERPL-MCNC: 0.64 MG/DL (ref 0.57–1)
GLOBULIN SER CALC-MCNC: 2.6 G/DL (ref 1.5–4.5)
GLUCOSE SERPL-MCNC: 80 MG/DL (ref 65–99)
HDLC SERPL-MCNC: 53 MG/DL
LDLC SERPL CALC-MCNC: 116 MG/DL (ref 0–99)
POTASSIUM SERPL-SCNC: 4.7 MMOL/L (ref 3.5–5.2)
PROT SERPL-MCNC: 6.7 G/DL (ref 6–8.5)
SODIUM SERPL-SCNC: 139 MMOL/L (ref 134–144)
TRIGL SERPL-MCNC: 304 MG/DL (ref 0–149)
TSH SERPL DL<=0.005 MIU/L-ACNC: 3.49 UIU/ML (ref 0.45–4.5)
VLDLC SERPL CALC-MCNC: 53 MG/DL (ref 5–40)

## 2021-11-17 LAB — HEMOCCULT STL QL IA: NEGATIVE

## 2022-01-27 ENCOUNTER — TELEPHONE (OUTPATIENT)
Dept: FAMILY MEDICINE | Facility: CLINIC | Age: 57
End: 2022-01-27
Payer: COMMERCIAL

## 2022-01-27 NOTE — TELEPHONE ENCOUNTER
----- Message from Neda Florence sent at 1/27/2022 12:44 PM CST -----  Pt calling requesting a call back today from Julienne personally Cb # [phone]

## 2022-01-27 NOTE — TELEPHONE ENCOUNTER
Spoke with pt, states she tested positive for Covid on Monday and she wants to discuss with Julienne the regime of medications she has been taking because she is still extremely tired. She refused to give any further information.

## 2022-01-31 ENCOUNTER — TELEPHONE (OUTPATIENT)
Dept: FAMILY MEDICINE | Facility: CLINIC | Age: 57
End: 2022-01-31
Payer: COMMERCIAL

## 2022-01-31 NOTE — TELEPHONE ENCOUNTER
Spoke with pt per Julienne, she will start taking her meclizine. She will call back tomorrow with an update.

## 2022-01-31 NOTE — TELEPHONE ENCOUNTER
----- Message from Lexy Sarah sent at 1/31/2022  9:06 AM CST -----  Pt wants to be seen today. She is dizzy. Please advise. Pt #[phone]

## 2022-02-07 ENCOUNTER — TELEPHONE (OUTPATIENT)
Dept: FAMILY MEDICINE | Facility: CLINIC | Age: 57
End: 2022-02-07
Payer: COMMERCIAL

## 2022-02-07 NOTE — TELEPHONE ENCOUNTER
----- Message from Lexy Sraah sent at 2/7/2022 11:45 AM CST -----  Pt wants to ask some questions. Pt #[phone]

## 2022-02-07 NOTE — TELEPHONE ENCOUNTER
Spoke with pt, she wanted her lab work faxed to Dr. Lynn - also wants to know how long she is able to take Meclizine for. States she has been taking it TID for her vertigo but she didn't know how long she is able to continue taking this.

## 2022-04-22 ENCOUNTER — TELEPHONE (OUTPATIENT)
Dept: FAMILY MEDICINE | Facility: CLINIC | Age: 57
End: 2022-04-22

## 2022-04-22 ENCOUNTER — PATIENT MESSAGE (OUTPATIENT)
Dept: FAMILY MEDICINE | Facility: CLINIC | Age: 57
End: 2022-04-22

## 2022-04-22 DIAGNOSIS — Z79.899 ENCOUNTER FOR LONG-TERM (CURRENT) USE OF MEDICATIONS: Primary | ICD-10-CM

## 2022-04-22 DIAGNOSIS — E03.9 HYPOTHYROIDISM, UNSPECIFIED TYPE: ICD-10-CM

## 2022-04-22 DIAGNOSIS — I10 HYPERTENSION, UNSPECIFIED TYPE: ICD-10-CM

## 2022-04-22 DIAGNOSIS — Z79.899 HIGH RISK MEDICATION USE: ICD-10-CM

## 2022-04-22 DIAGNOSIS — Z79.899 HIGH RISK MEDICATION USE: Primary | ICD-10-CM

## 2022-04-22 DIAGNOSIS — E78.5 HYPERLIPIDEMIA, UNSPECIFIED HYPERLIPIDEMIA TYPE: ICD-10-CM

## 2022-05-01 LAB
ALBUMIN SERPL-MCNC: 4.2 G/DL (ref 3.8–4.9)
ALBUMIN/CREAT UR: 15 MG/G CREAT (ref 0–29)
ALBUMIN/GLOB SERPL: 1.7 {RATIO} (ref 1.2–2.2)
ALP SERPL-CCNC: 70 IU/L (ref 44–121)
ALT SERPL-CCNC: 10 IU/L (ref 0–32)
APPEARANCE UR: CLEAR
AST SERPL-CCNC: 17 IU/L (ref 0–40)
BACTERIA #/AREA URNS HPF: NORMAL /[HPF]
BASOPHILS # BLD AUTO: 0 X10E3/UL (ref 0–0.2)
BASOPHILS NFR BLD AUTO: 1 %
BILIRUB SERPL-MCNC: 0.3 MG/DL (ref 0–1.2)
BILIRUB UR QL STRIP: NEGATIVE
BUN SERPL-MCNC: 16 MG/DL (ref 6–24)
BUN/CREAT SERPL: 25 (ref 9–23)
CALCIUM SERPL-MCNC: 9.5 MG/DL (ref 8.7–10.2)
CASTS URNS QL MICRO: NORMAL /LPF
CHLORIDE SERPL-SCNC: 102 MMOL/L (ref 96–106)
CHOLEST SERPL-MCNC: 207 MG/DL (ref 100–199)
CO2 SERPL-SCNC: 22 MMOL/L (ref 20–29)
COLOR UR: YELLOW
CREAT SERPL-MCNC: 0.63 MG/DL (ref 0.57–1)
CREAT UR-MCNC: 102.6 MG/DL
EOSINOPHIL # BLD AUTO: 0.1 X10E3/UL (ref 0–0.4)
EOSINOPHIL NFR BLD AUTO: 3 %
EPI CELLS #/AREA URNS HPF: NORMAL /HPF (ref 0–10)
ERYTHROCYTE [DISTWIDTH] IN BLOOD BY AUTOMATED COUNT: 13 % (ref 11.7–15.4)
EST. GFR  (NO RACE VARIABLE): 103 ML/MIN/1.73
GLOBULIN SER CALC-MCNC: 2.5 G/DL (ref 1.5–4.5)
GLUCOSE SERPL-MCNC: 86 MG/DL (ref 65–99)
GLUCOSE UR QL STRIP: NEGATIVE
HCT VFR BLD AUTO: 43.5 % (ref 34–46.6)
HDLC SERPL-MCNC: 65 MG/DL
HGB BLD-MCNC: 14.4 G/DL (ref 11.1–15.9)
HGB UR QL STRIP: NEGATIVE
IMM GRANULOCYTES # BLD AUTO: 0 X10E3/UL (ref 0–0.1)
IMM GRANULOCYTES NFR BLD AUTO: 0 %
KETONES UR QL STRIP: NEGATIVE
LDLC SERPL CALC-MCNC: 108 MG/DL (ref 0–99)
LEUKOCYTE ESTERASE UR QL STRIP: NEGATIVE
LYMPHOCYTES # BLD AUTO: 2.1 X10E3/UL (ref 0.7–3.1)
LYMPHOCYTES NFR BLD AUTO: 38 %
MCH RBC QN AUTO: 29.9 PG (ref 26.6–33)
MCHC RBC AUTO-ENTMCNC: 33.1 G/DL (ref 31.5–35.7)
MCV RBC AUTO: 90 FL (ref 79–97)
MICRO URNS: NORMAL
MICRO URNS: NORMAL
MICROALBUMIN UR-MCNC: 15 UG/ML
MONOCYTES # BLD AUTO: 0.4 X10E3/UL (ref 0.1–0.9)
MONOCYTES NFR BLD AUTO: 8 %
NEUTROPHILS # BLD AUTO: 2.8 X10E3/UL (ref 1.4–7)
NEUTROPHILS NFR BLD AUTO: 50 %
NITRITE UR QL STRIP: NEGATIVE
PH UR STRIP: 7 [PH] (ref 5–7.5)
PLATELET # BLD AUTO: 280 X10E3/UL (ref 150–450)
POTASSIUM SERPL-SCNC: 4.3 MMOL/L (ref 3.5–5.2)
PROT SERPL-MCNC: 6.7 G/DL (ref 6–8.5)
PROT UR QL STRIP: NORMAL
RBC # BLD AUTO: 4.81 X10E6/UL (ref 3.77–5.28)
RBC #/AREA URNS HPF: NORMAL /HPF (ref 0–2)
SODIUM SERPL-SCNC: 139 MMOL/L (ref 134–144)
SP GR UR STRIP: 1.01 (ref 1–1.03)
TRIGL SERPL-MCNC: 198 MG/DL (ref 0–149)
TSH SERPL DL<=0.005 MIU/L-ACNC: 5.79 UIU/ML (ref 0.45–4.5)
URINALYSIS REFLEX: NORMAL
UROBILINOGEN UR STRIP-MCNC: 0.2 MG/DL (ref 0.2–1)
VLDLC SERPL CALC-MCNC: 34 MG/DL (ref 5–40)
WBC # BLD AUTO: 5.5 X10E3/UL (ref 3.4–10.8)
WBC #/AREA URNS HPF: NORMAL /HPF (ref 0–5)

## 2022-05-09 ENCOUNTER — TELEPHONE (OUTPATIENT)
Dept: FAMILY MEDICINE | Facility: CLINIC | Age: 57
End: 2022-05-09

## 2022-05-09 ENCOUNTER — OFFICE VISIT (OUTPATIENT)
Dept: FAMILY MEDICINE | Facility: CLINIC | Age: 57
End: 2022-05-09
Payer: COMMERCIAL

## 2022-05-09 VITALS
BODY MASS INDEX: 29.18 KG/M2 | HEART RATE: 76 BPM | DIASTOLIC BLOOD PRESSURE: 78 MMHG | HEIGHT: 69 IN | SYSTOLIC BLOOD PRESSURE: 130 MMHG | WEIGHT: 197 LBS

## 2022-05-09 DIAGNOSIS — E78.5 HYPERLIPIDEMIA, UNSPECIFIED HYPERLIPIDEMIA TYPE: Primary | ICD-10-CM

## 2022-05-09 DIAGNOSIS — Z78.0 MENOPAUSE: ICD-10-CM

## 2022-05-09 DIAGNOSIS — F41.9 ANXIETY: ICD-10-CM

## 2022-05-09 DIAGNOSIS — Z79.899 HIGH RISK MEDICATION USE: ICD-10-CM

## 2022-05-09 DIAGNOSIS — I10 HYPERTENSION, UNSPECIFIED TYPE: ICD-10-CM

## 2022-05-09 DIAGNOSIS — E03.9 HYPOTHYROIDISM, UNSPECIFIED TYPE: ICD-10-CM

## 2022-05-09 PROCEDURE — 99214 OFFICE O/P EST MOD 30 MIN: CPT | Mod: S$GLB,,, | Performed by: NURSE PRACTITIONER

## 2022-05-09 PROCEDURE — 99214 PR OFFICE/OUTPT VISIT, EST, LEVL IV, 30-39 MIN: ICD-10-PCS | Mod: S$GLB,,, | Performed by: NURSE PRACTITIONER

## 2022-05-09 RX ORDER — SULFAMETHOXAZOLE AND TRIMETHOPRIM 800; 160 MG/1; MG/1
60 TABLET ORAL
Qty: 48 TABLET | Refills: 1 | Status: SHIPPED | OUTPATIENT
Start: 2022-05-10 | End: 2022-11-07

## 2022-05-09 RX ORDER — THYROID 90 MG/1
90 TABLET ORAL
Qty: 48 TABLET | Refills: 1 | Status: SHIPPED | OUTPATIENT
Start: 2022-05-09 | End: 2022-07-06 | Stop reason: SDUPTHER

## 2022-05-09 NOTE — PROGRESS NOTES
SUBJECTIVE:    Patient ID: Mariah Ashton is a 56 y.o. female.    Chief Complaint: Thyroid Problem (Brought list of medications//discuss FOBT ([facility])//bs)    56 year old female presents for check up. Treated for menopause, hypothyroid, palpitations and thoracic aortic aneurysm, htn. . Sees dr. Lynn regularly. bp readings have been <130/90.no medication changes. Recently had pacemaker check in his office. Recently had labs would like to discuss. Under some stress but managing ok overall. Pharmacy has been out of armour thyroid 90mg. So has not been taking for the last week. No exercise.    bp in office today was initially elevated. However once rechecked did go down.       Patient Message on 04/22/2022   Component Date Value Ref Range Status    WBC 04/30/2022 5.5  3.4 - 10.8 x10E3/uL Final    RBC 04/30/2022 4.81  3.77 - 5.28 x10E6/uL Final    Hemoglobin 04/30/2022 14.4  11.1 - 15.9 g/dL Final    Hematocrit 04/30/2022 43.5  34.0 - 46.6 % Final    MCV 04/30/2022 90  79 - 97 fL Final    MCH 04/30/2022 29.9  26.6 - 33.0 pg Final    MCHC 04/30/2022 33.1  31.5 - 35.7 g/dL Final    RDW 04/30/2022 13.0  11.7 - 15.4 % Final    Platelets 04/30/2022 280  150 - 450 x10E3/uL Final    Neutrophils 04/30/2022 50  Not Estab. % Final    Lymphs 04/30/2022 38  Not Estab. % Final    Monocytes 04/30/2022 8  Not Estab. % Final    Eos 04/30/2022 3  Not Estab. % Final    Basos 04/30/2022 1  Not Estab. % Final    Neutrophils (Absolute) 04/30/2022 2.8  1.4 - 7.0 x10E3/uL Final    Lymphs (Absolute) 04/30/2022 2.1  0.7 - 3.1 x10E3/uL Final    Monocytes(Absolute) 04/30/2022 0.4  0.1 - 0.9 x10E3/uL Final    Eos (Absolute) 04/30/2022 0.1  0.0 - 0.4 x10E3/uL Final    Baso (Absolute) 04/30/2022 0.0  0.0 - 0.2 x10E3/uL Final    Immature Granulocytes 04/30/2022 0  Not Estab. % Final    Immature Grans (Abs) 04/30/2022 0.0  0.0 - 0.1 x10E3/uL Final    Glucose 04/30/2022 86  65 - 99 mg/dL Final    BUN 04/30/2022 16  6 - 24  mg/dL Final    Creatinine 04/30/2022 0.63  0.57 - 1.00 mg/dL Final    eGFR no Race variable 04/30/2022 103  >59 mL/min/1.73 Final    BUN/Creatinine Ratio 04/30/2022 25 (A) 9 - 23 Final    Sodium 04/30/2022 139  134 - 144 mmol/L Final    Potassium 04/30/2022 4.3  3.5 - 5.2 mmol/L Final    Chloride 04/30/2022 102  96 - 106 mmol/L Final    CO2 04/30/2022 22  20 - 29 mmol/L Final    Calcium 04/30/2022 9.5  8.7 - 10.2 mg/dL Final    Protein, Total 04/30/2022 6.7  6.0 - 8.5 g/dL Final    Albumin 04/30/2022 4.2  3.8 - 4.9 g/dL Final    Globulin, Total 04/30/2022 2.5  1.5 - 4.5 g/dL Final    Albumin/Globulin Ratio 04/30/2022 1.7  1.2 - 2.2 Final    Total Bilirubin 04/30/2022 0.3  0.0 - 1.2 mg/dL Final    Alkaline Phosphatase 04/30/2022 70  44 - 121 IU/L Final    AST 04/30/2022 17  0 - 40 IU/L Final    ALT 04/30/2022 10  0 - 32 IU/L Final    Cholesterol 04/30/2022 207 (A) 100 - 199 mg/dL Final    Triglycerides 04/30/2022 198 (A) 0 - 149 mg/dL Final    HDL 04/30/2022 65  >39 mg/dL Final    VLDL Cholesterol Bobby 04/30/2022 34  5 - 40 mg/dL Final    LDL Calculated 04/30/2022 108 (A) 0 - 99 mg/dL Final    Creatinine, Urine 04/30/2022 102.6  Not Estab. mg/dL Final    Microalb, Ur 04/30/2022 15.0  Not Estab. ug/mL Final    Microalb/Crt. Ratio 04/30/2022 15  0 - 29 mg/g creat Final    TSH 04/30/2022 5.790 (A) 0.450 - 4.500 uIU/mL Final    Specific Gravity, UA 04/30/2022 1.015  1.005 - 1.030 Final    pH, UA 04/30/2022 7.0  5.0 - 7.5 Final    Color, UA 04/30/2022 Yellow  Yellow Final    Clarity, UA 04/30/2022 Clear  Clear Final    Leukocytes, UA 04/30/2022 Negative  Negative Final    Protein, UA 04/30/2022 Trace  Negative/Trace Final    Glucose, UA 04/30/2022 Negative  Negative Final    Ketones, UA 04/30/2022 Negative  Negative Final    Occult Blood UA 04/30/2022 Negative  Negative Final    Bilirubin, UA 04/30/2022 Negative  Negative Final    Urobilinogen, UA 04/30/2022 0.2  0.2 - 1.0 mg/dL Final     Nitrite, UA 2022 Negative  Negative Final    Microscopic Examination 2022 Comment   Final    Microscopic Examination 2022 See below:   Final    Urinalysis Reflex 2022 Comment   Final    WBC, UA 2022 None seen  0 - 5 /hpf Final    RBC, UA 2022 0-2  0 - 2 /hpf Final    Epithelial Cells (non renal) 2022 0-10  0 - 10 /hpf Final    Casts 2022 None seen  None seen /lpf Final    Bacteria, UA 2022 Few  None seen/Few Final   Orders Only on 11/15/2021   Component Date Value Ref Range Status    Fecal Globin, Insure 11/15/2021 Negative  Negative Final       Past Medical History:   Diagnosis Date    Bradycardia     Hypertension     Thyroid disease      Social History     Socioeconomic History    Marital status:    Tobacco Use    Smoking status: Former Smoker     Packs/day: 0.50     Years: 10.00     Pack years: 5.00     Start date:      Quit date:      Years since quittin.3    Smokeless tobacco: Never Used   Substance and Sexual Activity    Alcohol use: Yes     Comment: occassional    Drug use: Not Currently     Past Surgical History:   Procedure Laterality Date    ARTHROSCOPY OF KNEE Left 2019    Procedure: ARTHROSCOPY, KNEE;  Surgeon: Porter Cifuentes MD;  [facility];  Service: Orthopedics;  Laterality: Left;    ATRIAL CARDIAC PACEMAKER INSERTION      EXCISION OF MEDIAL MENISCUS OF KNEE Left 2019    Procedure: MENISCECTOMY, KNEE,  partial,MEDIAL,lateral;  Surgeon: Porter Cifuentes MD;  Location[facility];  Service: Orthopedics;  Laterality: Left;    HYSTERECTOMY      INSERTION OF PACEMAKER Left 2014    KNEE ARTHROSCOPY      left shoulder      SURGICAL REMOVAL OF BONE SPUR Left     Removed from leftr shoulder     Family History   Problem Relation Age of Onset    Diabetes Mother     No Known Problems Father     Heart disease Maternal Aunt     Diabetes Maternal Grandmother     Breast cancer Maternal  Grandmother     Diabetes Maternal Grandfather        Review of patient's allergies indicates:   Allergen Reactions    Comtrex Other (See Comments)       Current Outpatient Medications:     ascorbic acid, vitamin C, (VITAMIN C) 500 MG tablet, Take 500 mg by mouth every evening. , Disp: , Rfl:     estradioL (ESTRACE) 2 MG tablet, Take 1 tablet (2 mg total) by mouth every evening., Disp: 90 tablet, Rfl: 1    KRILL OIL ORAL, Take 500 mg by mouth. Mon, Tues, Wed, Thurs, Fri, Sat, Disp: , Rfl:     meclizine (ANTIVERT) 25 mg tablet, Take 1 tablet (25 mg total) by mouth 3 (three) times daily as needed., Disp: 30 tablet, Rfl: 3    metoprolol succinate (TOPROL-XL) 25 MG 24 hr tablet, Take 25 mg by mouth once daily., Disp: , Rfl:     vitamin D (VITAMIN D3) 1000 units Tab, Take 1,000 Units by mouth every evening. , Disp: , Rfl:     ALPRAZolam (XANAX) 0.25 MG tablet, , Disp: , Rfl:     [START ON 5/10/2022] ARMOUR THYROID 60 mg Tab, Take 1 tablet (60 mg total) by mouth every Tuesday, Thursday, Saturday, Sunday., Disp: 48 tablet, Rfl: 1    multivitamin (THERAGRAN) per tablet, Take 0.5 capsules by mouth every evening. , Disp: , Rfl:     potassium gluconate 595 mg (99 mg) Tab, Take 1 tablet by mouth every evening., Disp: , Rfl:     thyroid, pork, (ARMOUR THYROID) 90 mg Tab, Take 1 tablet (90 mg total) by mouth before breakfast. Every Mon, Wed, and Fri, Disp: 48 tablet, Rfl: 1    Review of Systems   Constitutional: Negative for chills, fever and unexpected weight change.   HENT: Negative for ear pain, rhinorrhea and sore throat.    Eyes: Negative for pain and visual disturbance.   Respiratory: Negative for cough and shortness of breath.    Cardiovascular: Negative for chest pain, palpitations and leg swelling.   Gastrointestinal: Negative for abdominal pain, diarrhea, nausea and vomiting.   Genitourinary: Negative for difficulty urinating, hematuria and vaginal bleeding.   Musculoskeletal: Negative for arthralgias.  "  Skin: Negative for rash.   Neurological: Negative for dizziness, weakness and headaches.   Psychiatric/Behavioral: Negative for agitation and sleep disturbance. The patient is not nervous/anxious.           Objective:      Vitals:    05/09/22 0918 05/09/22 0927 05/09/22 0946   BP: (!) 152/90 (!) 142/88 130/78   Pulse: 76     Weight: 89.4 kg (197 lb)     Height: 5' 9" (1.753 m)       Physical Exam  Vitals and nursing note reviewed.   Constitutional:       Appearance: She is well-developed.   HENT:      Head: Normocephalic.      Right Ear: External ear normal.      Left Ear: External ear normal.   Neck:      Vascular: No JVD.   Cardiovascular:      Rate and Rhythm: Normal rate and regular rhythm.      Heart sounds: No murmur heard.  Pulmonary:      Effort: Pulmonary effort is normal.      Breath sounds: Normal breath sounds.   Chest:          Comments: Pacemaker scar  Abdominal:      General: Bowel sounds are normal.      Palpations: Abdomen is soft.   Musculoskeletal:         General: No deformity. Normal range of motion.      Cervical back: Normal range of motion and neck supple.   Lymphadenopathy:      Cervical: No cervical adenopathy.   Skin:     General: Skin is warm and dry.      Findings: No rash.   Neurological:      Mental Status: She is alert and oriented to person, place, and time.      Gait: Gait normal.   Psychiatric:         Speech: Speech normal.         Behavior: Behavior normal.           Assessment:       1. Hyperlipidemia, unspecified hyperlipidemia type    2. Hypothyroidism, unspecified type    3. Hypertension, unspecified type    4. High risk medication use    5. Menopause    6. Anxiety         Plan:       Hyperlipidemia, unspecified hyperlipidemia type    Hypothyroidism, unspecified type  Comments:  increase armour thyroid to 90mg 5 days per week. repeat tsh in 6-8 weeks  Orders:  -     ARMOUR THYROID 60 mg Tab; Take 1 tablet (60 mg total) by mouth every Tuesday, Thursday, Saturday, Sunday.  " Dispense: 48 tablet; Refill: 1  -     thyroid, pork, (ARMOUR THYROID) 90 mg Tab; Take 1 tablet (90 mg total) by mouth before breakfast. Every Mon, Wed, and Fri  Dispense: 48 tablet; Refill: 1  -     TSH w/reflex to FT4; Future; Expected date: 05/09/2022    Hypertension, unspecified type    High risk medication use  -     TSH w/reflex to FT4; Future; Expected date: 05/09/2022    Menopause    Anxiety      Follow up in about 6 months (around 11/9/2022) for medication management.        5/9/2022 Julienne Damon

## 2022-06-20 DIAGNOSIS — Z78.0 MENOPAUSE: ICD-10-CM

## 2022-06-20 RX ORDER — ESTRADIOL 2 MG/1
2 TABLET ORAL NIGHTLY
Qty: 90 TABLET | Refills: 1 | Status: SHIPPED | OUTPATIENT
Start: 2022-06-20 | End: 2022-11-07 | Stop reason: SDUPTHER

## 2022-06-20 NOTE — TELEPHONE ENCOUNTER
----- Message from Lexy Sarah sent at 6/20/2022 11:05 AM CDT -----  Refill for estradiol. [facility] [address]. Pt #[phone]

## 2022-06-30 ENCOUNTER — PATIENT MESSAGE (OUTPATIENT)
Dept: FAMILY MEDICINE | Facility: CLINIC | Age: 57
End: 2022-06-30

## 2022-06-30 ENCOUNTER — TELEPHONE (OUTPATIENT)
Dept: FAMILY MEDICINE | Facility: CLINIC | Age: 57
End: 2022-06-30

## 2022-06-30 DIAGNOSIS — E03.9 HYPOTHYROIDISM, UNSPECIFIED TYPE: Primary | ICD-10-CM

## 2022-06-30 DIAGNOSIS — Z79.899 ENCOUNTER FOR LONG-TERM (CURRENT) USE OF MEDICATIONS: ICD-10-CM

## 2022-07-03 LAB
T4 FREE SERPL-MCNC: 0.59 NG/DL (ref 0.82–1.77)
TSH SERPL DL<=0.005 MIU/L-ACNC: 8.03 UIU/ML (ref 0.45–4.5)

## 2022-07-05 ENCOUNTER — TELEPHONE (OUTPATIENT)
Dept: FAMILY MEDICINE | Facility: CLINIC | Age: 57
End: 2022-07-05

## 2022-07-05 DIAGNOSIS — E03.9 HYPOTHYROIDISM, UNSPECIFIED TYPE: ICD-10-CM

## 2022-07-05 NOTE — TELEPHONE ENCOUNTER
----- Message from Julienne Damon NP sent at 7/5/2022  2:40 PM CDT -----  Underactive. Increase to 90mg daily

## 2022-07-05 NOTE — TELEPHONE ENCOUNTER
Spoke to patient regarding lab result and she verbalized understanding. She wants to know how is she supposed to take the medicine? Is she just supposed to take the 90 4 times a week or still do the 60 and 90 mcg? She takes the 90's Mon, Tues Thurs and Fri.

## 2022-07-06 RX ORDER — THYROID 90 MG/1
90 TABLET ORAL
Qty: 90 TABLET | Refills: 1 | Status: SHIPPED | OUTPATIENT
Start: 2022-07-06 | End: 2022-11-07 | Stop reason: SDUPTHER

## 2022-11-07 ENCOUNTER — OFFICE VISIT (OUTPATIENT)
Dept: FAMILY MEDICINE | Facility: CLINIC | Age: 57
End: 2022-11-07
Payer: COMMERCIAL

## 2022-11-07 VITALS
WEIGHT: 201 LBS | DIASTOLIC BLOOD PRESSURE: 86 MMHG | HEART RATE: 64 BPM | HEIGHT: 69 IN | SYSTOLIC BLOOD PRESSURE: 136 MMHG | BODY MASS INDEX: 29.77 KG/M2

## 2022-11-07 DIAGNOSIS — Z00.00 PHYSICAL EXAM: ICD-10-CM

## 2022-11-07 DIAGNOSIS — Z12.31 ENCOUNTER FOR SCREENING MAMMOGRAM FOR BREAST CANCER: Primary | ICD-10-CM

## 2022-11-07 DIAGNOSIS — Z78.0 MENOPAUSE: ICD-10-CM

## 2022-11-07 DIAGNOSIS — E78.5 HYPERLIPIDEMIA, UNSPECIFIED HYPERLIPIDEMIA TYPE: ICD-10-CM

## 2022-11-07 DIAGNOSIS — Z95.0 PACEMAKER: ICD-10-CM

## 2022-11-07 DIAGNOSIS — E03.9 HYPOTHYROIDISM, UNSPECIFIED TYPE: ICD-10-CM

## 2022-11-07 DIAGNOSIS — Z79.899 HIGH RISK MEDICATION USE: ICD-10-CM

## 2022-11-07 PROCEDURE — 99214 PR OFFICE/OUTPT VISIT, EST, LEVL IV, 30-39 MIN: ICD-10-PCS | Mod: S$GLB,,, | Performed by: NURSE PRACTITIONER

## 2022-11-07 PROCEDURE — 99214 OFFICE O/P EST MOD 30 MIN: CPT | Mod: S$GLB,,, | Performed by: NURSE PRACTITIONER

## 2022-11-07 RX ORDER — ESTRADIOL 2 MG/1
2 TABLET ORAL NIGHTLY
Qty: 90 TABLET | Refills: 1 | Status: SHIPPED | OUTPATIENT
Start: 2022-11-07 | End: 2023-03-20 | Stop reason: SDUPTHER

## 2022-11-07 RX ORDER — THYROID 90 MG/1
90 TABLET ORAL
Qty: 90 TABLET | Refills: 1 | Status: SHIPPED | OUTPATIENT
Start: 2022-11-07 | End: 2023-05-08 | Stop reason: SDUPTHER

## 2022-11-08 LAB — TSH SERPL-ACNC: 2.65 MIU/L (ref 0.4–4.5)

## 2022-11-21 ENCOUNTER — TELEPHONE (OUTPATIENT)
Dept: FAMILY MEDICINE | Facility: CLINIC | Age: 57
End: 2022-11-21

## 2022-11-21 NOTE — PROGRESS NOTES
SUBJECTIVE:    Patient ID: Mariah Ashton is a 57 y.o. female.    Chief Complaint: Follow-up (6mth, no bottles, brought pharmacy papers, Mammo pt has orders, Flu declined// SW)    57 year old female presents for check up. Treated for menopause, hypothyroid, palpitations and thoracic aortic aneurysm, htn. . Sees dr. Lynn regularly. Blood pressure has been up . Plans to discuss with dr. Lynn. Due for mammogram in November. Sleeps well. Still working. Not currently exercising. Taking thyroidmedications as prescribed. No recent episodes of dizziness.       Office Visit on 2022   Component Date Value Ref Range Status    TSH w/reflex to FT4 2022 2.65  0.40 - 4.50 mIU/L Final   Patient Message on 2022   Component Date Value Ref Range Status    TSH 2022 8.030 (H)  0.450 - 4.500 uIU/mL Final    T4, Free (Direct) 2022 0.59 (L)  0.82 - 1.77 ng/dL Final       Past Medical History:   Diagnosis Date    Bradycardia     Hypertension     Thyroid disease      Social History     Socioeconomic History    Marital status:    Tobacco Use    Smoking status: Former     Packs/day: 0.50     Years: 10.00     Pack years: 5.00     Types: Cigarettes     Start date:      Quit date:      Years since quittin.9    Smokeless tobacco: Never   Substance and Sexual Activity    Alcohol use: Yes     Comment: occassional    Drug use: Not Currently     Past Surgical History:   Procedure Laterality Date    ARTHROSCOPY OF KNEE Left 2019    Procedure: ARTHROSCOPY, KNEE;  Surgeon: Porter Cifuentes MD;  Location: [facility] OR;  Service: Orthopedics;  Laterality: Left;    ATRIAL CARDIAC PACEMAKER INSERTION      EXCISION OF MEDIAL MENISCUS OF KNEE Left 2019    Procedure: MENISCECTOMY, KNEE,  partial,MEDIAL,lateral;  Surgeon: Porter Cifuentes MD;  Location: [facility] OR;  Service: Orthopedics;  Laterality: Left;    HYSTERECTOMY      INSERTION OF PACEMAKER Left 2014    KNEE ARTHROSCOPY      left shoulder       SURGICAL REMOVAL OF BONE SPUR Left 2016    Removed from leftr shoulder     Family History   Problem Relation Age of Onset    Diabetes Mother     No Known Problems Father     Heart disease Maternal Aunt     Diabetes Maternal Grandmother     Breast cancer Maternal Grandmother     Diabetes Maternal Grandfather        Review of patient's allergies indicates:   Allergen Reactions    Comtrex Other (See Comments)       Current Outpatient Medications:     ALPRAZolam (XANAX) 0.25 MG tablet, , Disp: , Rfl:     ascorbic acid, vitamin C, (VITAMIN C) 500 MG tablet, Take 500 mg by mouth every evening. , Disp: , Rfl:     KRILL OIL ORAL, Take 500 mg by mouth. Mon, Tues, Wed, Thurs, Fri, Sat, Disp: , Rfl:     meclizine (ANTIVERT) 25 mg tablet, Take 1 tablet (25 mg total) by mouth 3 (three) times daily as needed., Disp: 30 tablet, Rfl: 3    metoprolol succinate (TOPROL-XL) 25 MG 24 hr tablet, Take 25 mg by mouth once daily., Disp: , Rfl:     multivitamin (THERAGRAN) per tablet, Take 0.5 capsules by mouth every evening. , Disp: , Rfl:     potassium gluconate 595 mg (99 mg) Tab, Take 1 tablet by mouth every evening., Disp: , Rfl:     vitamin D (VITAMIN D3) 1000 units Tab, Take 1,000 Units by mouth every evening. , Disp: , Rfl:     estradioL (ESTRACE) 2 MG tablet, Take 1 tablet (2 mg total) by mouth every evening., Disp: 90 tablet, Rfl: 1    thyroid, pork, (ARMOUR THYROID) 90 mg Tab, Take 1 tablet (90 mg total) by mouth before breakfast., Disp: 90 tablet, Rfl: 1    Review of Systems   Constitutional:  Negative for chills, fever and unexpected weight change.   HENT:  Negative for ear pain, rhinorrhea and sore throat.    Eyes:  Negative for pain and visual disturbance.   Respiratory:  Negative for cough and shortness of breath.    Cardiovascular:  Negative for chest pain, palpitations and leg swelling.   Gastrointestinal:  Negative for abdominal pain, diarrhea, nausea and vomiting.   Genitourinary:  Negative for difficulty urinating,  "hematuria and vaginal bleeding.   Musculoskeletal:  Negative for arthralgias.   Skin:  Negative for rash.   Neurological:  Negative for dizziness, weakness and headaches.   Psychiatric/Behavioral:  Negative for agitation and sleep disturbance. The patient is not nervous/anxious.         Objective:      Vitals:    11/07/22 0932   BP: 136/86   Pulse: 64   Weight: 91.2 kg (201 lb)   Height: 5' 9" (1.753 m)     Physical Exam  Vitals and nursing note reviewed.   Constitutional:       General: She is not in acute distress.     Appearance: Normal appearance. She is well-developed.   HENT:      Right Ear: External ear normal.      Left Ear: External ear normal.   Eyes:      Conjunctiva/sclera: Conjunctivae normal.   Neck:      Vascular: No JVD.   Cardiovascular:      Rate and Rhythm: Normal rate and regular rhythm.      Heart sounds: No murmur heard.  Pulmonary:      Effort: Pulmonary effort is normal.      Breath sounds: Normal breath sounds.   Abdominal:      General: Bowel sounds are normal.      Palpations: Abdomen is soft.   Musculoskeletal:         General: No deformity. Normal range of motion.      Cervical back: Normal range of motion and neck supple.   Lymphadenopathy:      Cervical: No cervical adenopathy.   Skin:     General: Skin is warm and dry.      Findings: No rash.   Neurological:      Mental Status: She is alert and oriented to person, place, and time.      Gait: Gait normal.   Psychiatric:         Speech: Speech normal.         Behavior: Behavior normal.         Assessment:       1. Encounter for screening mammogram for breast cancer    2. Hypothyroidism, unspecified type    3. Menopause    4. Pacemaker    5. Hyperlipidemia, unspecified hyperlipidemia type    6. High risk medication use    7. Physical exam           Plan:       Encounter for screening mammogram for breast cancer  -     Mammo Digital Screening Bilat; Future; Expected date: 11/07/2022    Hypothyroidism, unspecified type  Comments:  increase " armour thyroid to 90mg 5 days per week. repeat tsh in 6-8 weeks  Orders:  -     thyroid, pork, (ARMOUR THYROID) 90 mg Tab; Take 1 tablet (90 mg total) by mouth before breakfast.  Dispense: 90 tablet; Refill: 1  -     TSH w/reflex to FT4; Future; Expected date: 11/07/2022    Menopause  -     estradioL (ESTRACE) 2 MG tablet; Take 1 tablet (2 mg total) by mouth every evening.  Dispense: 90 tablet; Refill: 1    Pacemaker    Hyperlipidemia, unspecified hyperlipidemia type    High risk medication use    Physical exam    Follow up if symptoms worsen or fail to improve, for medication management.        11/21/2022 Julienne Damon

## 2022-11-22 ENCOUNTER — HOSPITAL ENCOUNTER (OUTPATIENT)
Dept: RADIOLOGY | Facility: HOSPITAL | Age: 57
Discharge: HOME | End: 2022-11-22
Attending: NURSE PRACTITIONER
Payer: COMMERCIAL

## 2022-11-22 DIAGNOSIS — Z12.31 ENCOUNTER FOR SCREENING MAMMOGRAM FOR BREAST CANCER: ICD-10-CM

## 2022-11-22 PROCEDURE — 77063 BREAST TOMOSYNTHESIS BI: CPT | Mod: TC,PO

## 2023-03-20 DIAGNOSIS — Z78.0 MENOPAUSE: ICD-10-CM

## 2023-03-20 RX ORDER — ESTRADIOL 2 MG/1
2 TABLET ORAL NIGHTLY
Qty: 90 TABLET | Refills: 1 | Status: SHIPPED | OUTPATIENT
Start: 2023-03-20 | End: 2023-05-08 | Stop reason: SDUPTHER

## 2023-03-20 NOTE — TELEPHONE ENCOUNTER
----- Message from Nicole Byrne sent at 3/20/2023 10:42 AM CDT -----  Refill on: estradioL    [id number] - [place], [place] - [address]    [phone]     Shaq

## 2023-04-11 ENCOUNTER — PATIENT MESSAGE (OUTPATIENT)
Dept: ADMINISTRATIVE | Facility: HOSPITAL | Age: 58
End: 2023-04-11

## 2023-05-02 ENCOUNTER — TELEPHONE (OUTPATIENT)
Dept: FAMILY MEDICINE | Facility: CLINIC | Age: 58
End: 2023-05-02

## 2023-05-02 DIAGNOSIS — Z00.00 PHYSICAL EXAM: Primary | ICD-10-CM

## 2023-05-02 DIAGNOSIS — E78.5 HYPERLIPIDEMIA, UNSPECIFIED HYPERLIPIDEMIA TYPE: ICD-10-CM

## 2023-05-02 DIAGNOSIS — E03.9 HYPOTHYROIDISM, UNSPECIFIED TYPE: ICD-10-CM

## 2023-05-02 DIAGNOSIS — Z79.899 ENCOUNTER FOR LONG-TERM (CURRENT) USE OF MEDICATIONS: ICD-10-CM

## 2023-05-02 DIAGNOSIS — Z79.899 HIGH RISK MEDICATION USE: ICD-10-CM

## 2023-05-02 DIAGNOSIS — I10 HYPERTENSION, UNSPECIFIED TYPE: ICD-10-CM

## 2023-05-08 ENCOUNTER — OFFICE VISIT (OUTPATIENT)
Dept: FAMILY MEDICINE | Facility: CLINIC | Age: 58
End: 2023-05-08
Payer: COMMERCIAL

## 2023-05-08 VITALS
HEART RATE: 72 BPM | DIASTOLIC BLOOD PRESSURE: 88 MMHG | SYSTOLIC BLOOD PRESSURE: 130 MMHG | BODY MASS INDEX: 30.27 KG/M2 | WEIGHT: 204.38 LBS | HEIGHT: 69 IN | OXYGEN SATURATION: 97 %

## 2023-05-08 DIAGNOSIS — E03.9 HYPOTHYROIDISM, UNSPECIFIED TYPE: ICD-10-CM

## 2023-05-08 DIAGNOSIS — R42 VERTIGO: ICD-10-CM

## 2023-05-08 DIAGNOSIS — Z78.0 MENOPAUSE: ICD-10-CM

## 2023-05-08 DIAGNOSIS — I10 HYPERTENSION, UNSPECIFIED TYPE: ICD-10-CM

## 2023-05-08 DIAGNOSIS — E78.5 HYPERLIPIDEMIA, UNSPECIFIED HYPERLIPIDEMIA TYPE: ICD-10-CM

## 2023-05-08 DIAGNOSIS — Z95.0 PACEMAKER: Primary | ICD-10-CM

## 2023-05-08 DIAGNOSIS — Z79.899 HIGH RISK MEDICATION USE: ICD-10-CM

## 2023-05-08 PROCEDURE — 99214 PR OFFICE/OUTPT VISIT, EST, LEVL IV, 30-39 MIN: ICD-10-PCS | Mod: S$GLB,,, | Performed by: NURSE PRACTITIONER

## 2023-05-08 PROCEDURE — 99214 OFFICE O/P EST MOD 30 MIN: CPT | Mod: S$GLB,,, | Performed by: NURSE PRACTITIONER

## 2023-05-08 RX ORDER — ESTRADIOL 2 MG/1
2 TABLET ORAL NIGHTLY
Qty: 90 TABLET | Refills: 0 | Status: SHIPPED | OUTPATIENT
Start: 2023-05-08 | End: 2023-07-10

## 2023-05-08 RX ORDER — THYROID 90 MG/1
90 TABLET ORAL
Qty: 90 TABLET | Refills: 0 | Status: SHIPPED | OUTPATIENT
Start: 2023-05-08 | End: 2023-10-09 | Stop reason: SDUPTHER

## 2023-05-08 NOTE — PROGRESS NOTES
SUBJECTIVE:    Patient ID: Mariah Ashton is a 57 y.o. female.    Chief Complaint: Follow-up (No bottles/ 6 month with no complaints/ med refill/ decline colon/bg)    57 year old female presents for check up. Treated for menopause, hypothyroid, palpitations and thoracic aortic aneurysm, htn. . Sees dr. Lynn regularly. Blood pressure his well controlled in office today. No checking at home. No palpitations. No dizziness. last mammogram in November. Sleeps well. Still working. Not currently exercising. Taking thyroidmedications as prescribed. Had labs would like to discuss results. Has reoccurring rash to backside. No drainage. Occurs spontaneously. No pain at site.does drain after some days. Rash is not currently present      Telephone on 05/02/2023   Component Date Value Ref Range Status    WBC 05/08/2023 7.5  3.8 - 10.8 Thousand/uL Final    RBC 05/08/2023 5.04  3.80 - 5.10 Million/uL Final    Hemoglobin 05/08/2023 15.3  11.7 - 15.5 g/dL Final    Hematocrit 05/08/2023 44.9  35.0 - 45.0 % Final    MCV 05/08/2023 89.1  80.0 - 100.0 fL Final    MCH 05/08/2023 30.4  27.0 - 33.0 pg Final    MCHC 05/08/2023 34.1  32.0 - 36.0 g/dL Final    RDW 05/08/2023 12.9  11.0 - 15.0 % Final    Platelets 05/08/2023 321  140 - 400 Thousand/uL Final    MPV 05/08/2023 9.5  7.5 - 12.5 fL Final    Neutrophils, Abs 05/08/2023 4,328  1,500 - 7,800 cells/uL Final    Lymph # 05/08/2023 2,445  850 - 3,900 cells/uL Final    Mono # 05/08/2023 495  200 - 950 cells/uL Final    Eos # 05/08/2023 173  15 - 500 cells/uL Final    Baso # 05/08/2023 60  0 - 200 cells/uL Final    Neutrophils Relative 05/08/2023 57.7  % Final    Lymph % 05/08/2023 32.6  % Final    Mono % 05/08/2023 6.6  % Final    Eosinophil % 05/08/2023 2.3  % Final    Basophil % 05/08/2023 0.8  % Final    Glucose 05/08/2023 78  65 - 99 mg/dL Final    BUN 05/08/2023 14  7 - 25 mg/dL Final    Creatinine 05/08/2023 0.59  0.50 - 1.03 mg/dL Final    eGFR 05/08/2023 104  > OR = 60  mL/min/1.73m2 Final    BUN/Creatinine Ratio 05/08/2023 NOT APPLICABLE  6 - 22 (calc) Final    Sodium 05/08/2023 138  135 - 146 mmol/L Final    Potassium 05/08/2023 3.8  3.5 - 5.3 mmol/L Final    Chloride 05/08/2023 101  98 - 110 mmol/L Final    CO2 05/08/2023 28  20 - 32 mmol/L Final    Calcium 05/08/2023 9.5  8.6 - 10.4 mg/dL Final    Total Protein 05/08/2023 7.2  6.1 - 8.1 g/dL Final    Albumin 05/08/2023 4.2  3.6 - 5.1 g/dL Final    Globulin, Total 05/08/2023 3.0  1.9 - 3.7 g/dL (calc) Final    Albumin/Globulin Ratio 05/08/2023 1.4  1.0 - 2.5 (calc) Final    Total Bilirubin 05/08/2023 0.5  0.2 - 1.2 mg/dL Final    Alkaline Phosphatase 05/08/2023 70  37 - 153 U/L Final    AST 05/08/2023 17  10 - 35 U/L Final    ALT 05/08/2023 12  6 - 29 U/L Final    Cholesterol 05/08/2023 223 (H)  <200 mg/dL Final    HDL 05/08/2023 70  > OR = 50 mg/dL Final    Triglycerides 05/08/2023 291 (H)  <150 mg/dL Final    LDL Cholesterol 05/08/2023 111 (H)  mg/dL (calc) Final    HDL/Cholesterol Ratio 05/08/2023 3.2  <5.0 (calc) Final    Non HDL Chol. (LDL+VLDL) 05/08/2023 153 (H)  <130 mg/dL (calc) Final    TSH w/reflex to FT4 05/08/2023 2.75  0.40 - 4.50 mIU/L Final    Color, UA 05/08/2023 YELLOW  YELLOW Final    Appearance, UA 05/08/2023 CLEAR  CLEAR Final    Specific Gravity, UA 05/08/2023 1.016  1.001 - 1.035 Final    pH, UA 05/08/2023 7.0  5.0 - 8.0 Final    Glucose, UA 05/08/2023 NEGATIVE  NEGATIVE Final    Bilirubin, UA 05/08/2023 NEGATIVE  NEGATIVE Final    Ketones, UA 05/08/2023 NEGATIVE  NEGATIVE Final    Occult Blood UA 05/08/2023 NEGATIVE  NEGATIVE Final    Protein, UA 05/08/2023 NEGATIVE  NEGATIVE Final    Nitrite, UA 05/08/2023 NEGATIVE  NEGATIVE Final    Leukocytes, UA 05/08/2023 NEGATIVE  NEGATIVE Final    WBC Casts, UA 05/08/2023 NONE SEEN  < OR = 5 /HPF Final    RBC Casts, UA 05/08/2023 3-10 (A)  < OR = 2 /HPF Final    Squam Epithel, UA 05/08/2023 0-5  < OR = 5 /HPF Final    Bacteria, UA 05/08/2023 FEW (A)  NONE SEEN /HPF  Final    Hyaline Casts, UA 2023 NONE SEEN  NONE SEEN /LPF Final    Service Cmt: 2023    Final    Reflexive Urine Culture 2023    Final    Creatinine, Urine 2023 132  20 - 275 mg/dL Final    Microalb, Ur 2023 1.6  See Note: mg/dL Final    Microalb/Creat Ratio 2023 12  <30 mcg/mg creat Final       Past Medical History:   Diagnosis Date    Bradycardia     Hypertension     Thyroid disease      Social History     Socioeconomic History    Marital status:    Tobacco Use    Smoking status: Former     Packs/day: 0.50     Years: 10.00     Pack years: 5.00     Types: Cigarettes     Start date:      Quit date:      Years since quittin.3    Smokeless tobacco: Never   Substance and Sexual Activity    Alcohol use: Yes     Comment: occassional    Drug use: Not Currently     Past Surgical History:   Procedure Laterality Date    ARTHROSCOPY OF KNEE Left 2019    Procedure: ARTHROSCOPY, KNEE;  Surgeon: Porter Cifuentes MD;  Location: [facility] OR;  Service: Orthopedics;  Laterality: Left;    ATRIAL CARDIAC PACEMAKER INSERTION      EXCISION OF MEDIAL MENISCUS OF KNEE Left 2019    Procedure: MENISCECTOMY, KNEE,  partial,MEDIAL,lateral;  Surgeon: Porter Cifuentes MD;  Location: [facility] OR;  Service: Orthopedics;  Laterality: Left;    HYSTERECTOMY      INSERTION OF PACEMAKER Left 2014    KNEE ARTHROSCOPY      left shoulder      SURGICAL REMOVAL OF BONE SPUR Left     Removed from leftr shoulder     Family History   Problem Relation Age of Onset    Diabetes Mother     No Known Problems Father     Heart disease Maternal Aunt     Diabetes Maternal Grandmother     Breast cancer Maternal Grandmother     Diabetes Maternal Grandfather        Review of patient's allergies indicates:   Allergen Reactions    Comtrex Other (See Comments)       Current Outpatient Medications:     ALPRAZolam (XANAX) 0.25 MG tablet, , Disp: , Rfl:     ascorbic acid, vitamin C, (VITAMIN C) 500 MG tablet, Take  "500 mg by mouth every evening. , Disp: , Rfl:     KRILL OIL ORAL, Take 500 mg by mouth. Mon, Tues, Wed, Thurs, Fri, Sat, Disp: , Rfl:     meclizine (ANTIVERT) 25 mg tablet, Take 1 tablet (25 mg total) by mouth 3 (three) times daily as needed., Disp: 30 tablet, Rfl: 3    metoprolol succinate (TOPROL-XL) 25 MG 24 hr tablet, Take 25 mg by mouth once daily., Disp: , Rfl:     multivitamin (THERAGRAN) per tablet, Take 0.5 capsules by mouth every evening. , Disp: , Rfl:     potassium gluconate 595 mg (99 mg) Tab, Take 1 tablet by mouth every evening., Disp: , Rfl:     vitamin D (VITAMIN D3) 1000 units Tab, Take 1,000 Units by mouth every evening. , Disp: , Rfl:     estradioL (ESTRACE) 2 MG tablet, Take 1 tablet (2 mg total) by mouth every evening., Disp: 90 tablet, Rfl: 0    thyroid, pork, (ARMOUR THYROID) 90 mg Tab, Take 1 tablet (90 mg total) by mouth before breakfast., Disp: 90 tablet, Rfl: 0    Review of Systems   Constitutional:  Negative for chills, fever and unexpected weight change.   HENT:  Negative for ear pain, rhinorrhea and sore throat.    Eyes:  Negative for pain and visual disturbance.   Respiratory:  Negative for cough and shortness of breath.    Cardiovascular:  Negative for chest pain, palpitations and leg swelling.   Gastrointestinal:  Negative for abdominal pain, diarrhea, nausea and vomiting.   Genitourinary:  Negative for difficulty urinating, hematuria and vaginal bleeding.   Musculoskeletal:  Negative for arthralgias.   Skin:  Negative for rash.   Neurological:  Negative for dizziness, weakness and headaches.   Psychiatric/Behavioral:  Negative for agitation and sleep disturbance. The patient is not nervous/anxious.         Objective:      Vitals:    05/08/23 0914   BP: 130/88   Pulse: 72   SpO2: 97%   Weight: 92.7 kg (204 lb 6.4 oz)   Height: 5' 9" (1.753 m)     Physical Exam  Vitals and nursing note reviewed.   Constitutional:       General: She is not in acute distress.     Appearance: Normal " appearance. She is well-developed.   HENT:      Head: Normocephalic.      Right Ear: External ear normal.      Left Ear: External ear normal.   Eyes:      Conjunctiva/sclera: Conjunctivae normal.      Pupils: Pupils are equal, round, and reactive to light.   Neck:      Vascular: No JVD.   Cardiovascular:      Rate and Rhythm: Normal rate and regular rhythm.      Heart sounds: No murmur heard.  Pulmonary:      Effort: Pulmonary effort is normal.      Breath sounds: Normal breath sounds.   Abdominal:      General: Bowel sounds are normal.      Palpations: Abdomen is soft.   Musculoskeletal:         General: No deformity. Normal range of motion.      Cervical back: Normal range of motion and neck supple.   Lymphadenopathy:      Cervical: No cervical adenopathy.   Skin:     General: Skin is warm and dry.      Findings: No rash.   Neurological:      Mental Status: She is alert and oriented to person, place, and time.      Gait: Gait normal.   Psychiatric:         Speech: Speech normal.         Behavior: Behavior normal.         Assessment:       1. Pacemaker    2. Menopause    3. Hypothyroidism, unspecified type    4. Hyperlipidemia, unspecified hyperlipidemia type    5. High risk medication use    6. Hypertension, unspecified type    7. Vertigo         Plan:       Pacemaker    Menopause  -     estradioL (ESTRACE) 2 MG tablet; Take 1 tablet (2 mg total) by mouth every evening.  Dispense: 90 tablet; Refill: 0    Hypothyroidism, unspecified type  Comments:  increase armour thyroid to 90mg 5 days per week. repeat tsh in 6-8 weeks  Orders:  -     thyroid, pork, (ARMOUR THYROID) 90 mg Tab; Take 1 tablet (90 mg total) by mouth before breakfast.  Dispense: 90 tablet; Refill: 0    Hyperlipidemia, unspecified hyperlipidemia type    High risk medication use    Hypertension, unspecified type    Vertigo      Follow up in about 6 months (around 11/8/2023), or if symptoms worsen or fail to improve, for medication management.         5/15/2023 Julienne Damon

## 2023-05-09 LAB
ALBUMIN SERPL-MCNC: 4.2 G/DL (ref 3.6–5.1)
ALBUMIN/CREAT UR: 12 MCG/MG CREAT
ALBUMIN/GLOB SERPL: 1.4 (CALC) (ref 1–2.5)
ALP SERPL-CCNC: 70 U/L (ref 37–153)
ALT SERPL-CCNC: 12 U/L (ref 6–29)
APPEARANCE UR: CLEAR
AST SERPL-CCNC: 17 U/L (ref 10–35)
BACTERIA #/AREA URNS HPF: ABNORMAL /HPF
BACTERIA UR CULT: ABNORMAL
BASOPHILS # BLD AUTO: 60 CELLS/UL (ref 0–200)
BASOPHILS NFR BLD AUTO: 0.8 %
BILIRUB SERPL-MCNC: 0.5 MG/DL (ref 0.2–1.2)
BILIRUB UR QL STRIP: NEGATIVE
BUN SERPL-MCNC: 14 MG/DL (ref 7–25)
BUN/CREAT SERPL: NORMAL (CALC) (ref 6–22)
CALCIUM SERPL-MCNC: 9.5 MG/DL (ref 8.6–10.4)
CHLORIDE SERPL-SCNC: 101 MMOL/L (ref 98–110)
CHOLEST SERPL-MCNC: 223 MG/DL
CHOLEST/HDLC SERPL: 3.2 (CALC)
CO2 SERPL-SCNC: 28 MMOL/L (ref 20–32)
COLOR UR: YELLOW
CREAT SERPL-MCNC: 0.59 MG/DL (ref 0.5–1.03)
CREAT UR-MCNC: 132 MG/DL (ref 20–275)
EGFR: 104 ML/MIN/1.73M2
EOSINOPHIL # BLD AUTO: 173 CELLS/UL (ref 15–500)
EOSINOPHIL NFR BLD AUTO: 2.3 %
ERYTHROCYTE [DISTWIDTH] IN BLOOD BY AUTOMATED COUNT: 12.9 % (ref 11–15)
GLOBULIN SER CALC-MCNC: 3 G/DL (CALC) (ref 1.9–3.7)
GLUCOSE SERPL-MCNC: 78 MG/DL (ref 65–99)
GLUCOSE UR QL STRIP: NEGATIVE
HCT VFR BLD AUTO: 44.9 % (ref 35–45)
HDLC SERPL-MCNC: 70 MG/DL
HGB BLD-MCNC: 15.3 G/DL (ref 11.7–15.5)
HGB UR QL STRIP: NEGATIVE
HYALINE CASTS #/AREA URNS LPF: ABNORMAL /LPF
KETONES UR QL STRIP: NEGATIVE
LDLC SERPL CALC-MCNC: 111 MG/DL (CALC)
LEUKOCYTE ESTERASE UR QL STRIP: NEGATIVE
LYMPHOCYTES # BLD AUTO: 2445 CELLS/UL (ref 850–3900)
LYMPHOCYTES NFR BLD AUTO: 32.6 %
MCH RBC QN AUTO: 30.4 PG (ref 27–33)
MCHC RBC AUTO-ENTMCNC: 34.1 G/DL (ref 32–36)
MCV RBC AUTO: 89.1 FL (ref 80–100)
MICROALBUMIN UR-MCNC: 1.6 MG/DL
MONOCYTES # BLD AUTO: 495 CELLS/UL (ref 200–950)
MONOCYTES NFR BLD AUTO: 6.6 %
NEUTROPHILS # BLD AUTO: 4328 CELLS/UL (ref 1500–7800)
NEUTROPHILS NFR BLD AUTO: 57.7 %
NITRITE UR QL STRIP: NEGATIVE
NONHDLC SERPL-MCNC: 153 MG/DL (CALC)
PH UR STRIP: 7 [PH] (ref 5–8)
PLATELET # BLD AUTO: 321 THOUSAND/UL (ref 140–400)
PMV BLD REES-ECKER: 9.5 FL (ref 7.5–12.5)
POTASSIUM SERPL-SCNC: 3.8 MMOL/L (ref 3.5–5.3)
PROT SERPL-MCNC: 7.2 G/DL (ref 6.1–8.1)
PROT UR QL STRIP: NEGATIVE
RBC # BLD AUTO: 5.04 MILLION/UL (ref 3.8–5.1)
RBC #/AREA URNS HPF: ABNORMAL /HPF
SERVICE CMNT-IMP: ABNORMAL
SODIUM SERPL-SCNC: 138 MMOL/L (ref 135–146)
SP GR UR STRIP: 1.02 (ref 1–1.03)
SQUAMOUS #/AREA URNS HPF: ABNORMAL /HPF
TRIGL SERPL-MCNC: 291 MG/DL
TSH SERPL-ACNC: 2.75 MIU/L (ref 0.4–4.5)
WBC # BLD AUTO: 7.5 THOUSAND/UL (ref 3.8–10.8)
WBC #/AREA URNS HPF: ABNORMAL /HPF

## 2023-05-10 ENCOUNTER — TELEPHONE (OUTPATIENT)
Dept: FAMILY MEDICINE | Facility: CLINIC | Age: 58
End: 2023-05-10

## 2023-05-10 NOTE — TELEPHONE ENCOUNTER
Spoke with pt in regards to recent lab results. Verbalized per Julienne that pt's cholesterol has increased some since last lab draw. Pt's triglycerides are elevated as well. Julienne would like for the her to start low fat low cholesterol diet. Rest of labs are within normal limits. Pt acknowledged understanding.

## 2023-05-10 NOTE — TELEPHONE ENCOUNTER
----- Message from Julienne Damon NP sent at 5/9/2023 10:14 PM CDT -----  Cholesterol has increased some since last lab draw. Triglycerides are elevated as well. Start low fat low cholesterol diet. Rest of labs are within normal limits.

## 2023-07-10 ENCOUNTER — TELEPHONE (OUTPATIENT)
Dept: FAMILY MEDICINE | Facility: CLINIC | Age: 58
End: 2023-07-10

## 2023-07-10 ENCOUNTER — OFFICE VISIT (OUTPATIENT)
Dept: FAMILY MEDICINE | Facility: CLINIC | Age: 58
End: 2023-07-10
Payer: COMMERCIAL

## 2023-07-10 DIAGNOSIS — E78.1 HIGH TRIGLYCERIDES: ICD-10-CM

## 2023-07-10 DIAGNOSIS — Z78.0 MENOPAUSE: ICD-10-CM

## 2023-07-10 DIAGNOSIS — L98.9 SKIN LESION OF RIGHT LEG: Primary | ICD-10-CM

## 2023-07-10 PROCEDURE — 99214 PR OFFICE/OUTPT VISIT, EST, LEVL IV, 30-39 MIN: ICD-10-PCS | Mod: S$GLB,,, | Performed by: NURSE PRACTITIONER

## 2023-07-10 PROCEDURE — 99214 OFFICE O/P EST MOD 30 MIN: CPT | Mod: S$GLB,,, | Performed by: NURSE PRACTITIONER

## 2023-07-10 RX ORDER — ESTRADIOL 2 MG/1
1 TABLET ORAL NIGHTLY
Qty: 90 TABLET | Refills: 0
Start: 2023-07-10 | End: 2023-10-09 | Stop reason: SDUPTHER

## 2023-07-10 NOTE — TELEPHONE ENCOUNTER
----- Message from Jimmie Albarado MA sent at 7/10/2023  8:10 AM CDT -----  Regarding: call back  Pt calling to ask for a call back because the spot has come back and she needs it swabbed. [phone]

## 2023-07-10 NOTE — Clinical Note
Did culture get sent to [facility]:  I discussed with the patient the risks of [facility] including but not limited to erythema, scaling, itching, weeping, crusting, and pain.

## 2023-07-14 VITALS
WEIGHT: 203.38 LBS | SYSTOLIC BLOOD PRESSURE: 136 MMHG | HEART RATE: 65 BPM | DIASTOLIC BLOOD PRESSURE: 82 MMHG | OXYGEN SATURATION: 97 % | HEIGHT: 70 IN | BODY MASS INDEX: 29.12 KG/M2

## 2023-07-14 NOTE — PROGRESS NOTES
SUBJECTIVE:    Patient ID: Mariah Ashton is a 58 y.o. female.    Chief Complaint: Follow-up (No bottles//Pt is here a check up and possible culture swab//decline colo//CYDNEY )    58 year old female presents for urgent visit. Treated for menopause, hypothyroid, palpitations and thoracic aortic aneurysm, htn. . Today she is presenting for evaluation of recurrent rash. Reports that rash has occurred intermittently over the past few years. Denies history of hsv. No pain at site.   Would like to discuss recent labs. Concerned that triglycerides have been elevated      Telephone on 05/02/2023   Component Date Value Ref Range Status    WBC 05/08/2023 7.5  3.8 - 10.8 Thousand/uL Final    RBC 05/08/2023 5.04  3.80 - 5.10 Million/uL Final    Hemoglobin 05/08/2023 15.3  11.7 - 15.5 g/dL Final    Hematocrit 05/08/2023 44.9  35.0 - 45.0 % Final    MCV 05/08/2023 89.1  80.0 - 100.0 fL Final    MCH 05/08/2023 30.4  27.0 - 33.0 pg Final    MCHC 05/08/2023 34.1  32.0 - 36.0 g/dL Final    RDW 05/08/2023 12.9  11.0 - 15.0 % Final    Platelets 05/08/2023 321  140 - 400 Thousand/uL Final    MPV 05/08/2023 9.5  7.5 - 12.5 fL Final    Neutrophils, Abs 05/08/2023 4,328  1,500 - 7,800 cells/uL Final    Lymph # 05/08/2023 2,445  850 - 3,900 cells/uL Final    Mono # 05/08/2023 495  200 - 950 cells/uL Final    Eos # 05/08/2023 173  15 - 500 cells/uL Final    Baso # 05/08/2023 60  0 - 200 cells/uL Final    Neutrophils Relative 05/08/2023 57.7  % Final    Lymph % 05/08/2023 32.6  % Final    Mono % 05/08/2023 6.6  % Final    Eosinophil % 05/08/2023 2.3  % Final    Basophil % 05/08/2023 0.8  % Final    Glucose 05/08/2023 78  65 - 99 mg/dL Final    BUN 05/08/2023 14  7 - 25 mg/dL Final    Creatinine 05/08/2023 0.59  0.50 - 1.03 mg/dL Final    eGFR 05/08/2023 104  > OR = 60 mL/min/1.73m2 Final    BUN/Creatinine Ratio 05/08/2023 NOT APPLICABLE  6 - 22 (calc) Final    Sodium 05/08/2023 138  135 - 146 mmol/L Final    Potassium 05/08/2023 3.8  3.5 -  5.3 mmol/L Final    Chloride 05/08/2023 101  98 - 110 mmol/L Final    CO2 05/08/2023 28  20 - 32 mmol/L Final    Calcium 05/08/2023 9.5  8.6 - 10.4 mg/dL Final    Total Protein 05/08/2023 7.2  6.1 - 8.1 g/dL Final    Albumin 05/08/2023 4.2  3.6 - 5.1 g/dL Final    Globulin, Total 05/08/2023 3.0  1.9 - 3.7 g/dL (calc) Final    Albumin/Globulin Ratio 05/08/2023 1.4  1.0 - 2.5 (calc) Final    Total Bilirubin 05/08/2023 0.5  0.2 - 1.2 mg/dL Final    Alkaline Phosphatase 05/08/2023 70  37 - 153 U/L Final    AST 05/08/2023 17  10 - 35 U/L Final    ALT 05/08/2023 12  6 - 29 U/L Final    Cholesterol 05/08/2023 223 (H)  <200 mg/dL Final    HDL 05/08/2023 70  > OR = 50 mg/dL Final    Triglycerides 05/08/2023 291 (H)  <150 mg/dL Final    LDL Cholesterol 05/08/2023 111 (H)  mg/dL (calc) Final    HDL/Cholesterol Ratio 05/08/2023 3.2  <5.0 (calc) Final    Non HDL Chol. (LDL+VLDL) 05/08/2023 153 (H)  <130 mg/dL (calc) Final    TSH w/reflex to FT4 05/08/2023 2.75  0.40 - 4.50 mIU/L Final    Color, UA 05/08/2023 YELLOW  YELLOW Final    Appearance, UA 05/08/2023 CLEAR  CLEAR Final    Specific Gravity, UA 05/08/2023 1.016  1.001 - 1.035 Final    pH, UA 05/08/2023 7.0  5.0 - 8.0 Final    Glucose, UA 05/08/2023 NEGATIVE  NEGATIVE Final    Bilirubin, UA 05/08/2023 NEGATIVE  NEGATIVE Final    Ketones, UA 05/08/2023 NEGATIVE  NEGATIVE Final    Occult Blood UA 05/08/2023 NEGATIVE  NEGATIVE Final    Protein, UA 05/08/2023 NEGATIVE  NEGATIVE Final    Nitrite, UA 05/08/2023 NEGATIVE  NEGATIVE Final    Leukocytes, UA 05/08/2023 NEGATIVE  NEGATIVE Final    WBC Casts, UA 05/08/2023 NONE SEEN  < OR = 5 /HPF Final    RBC Casts, UA 05/08/2023 3-10 (A)  < OR = 2 /HPF Final    Squam Epithel, UA 05/08/2023 0-5  < OR = 5 /HPF Final    Bacteria, UA 05/08/2023 FEW (A)  NONE SEEN /HPF Final    Hyaline Casts, UA 05/08/2023 NONE SEEN  NONE SEEN /LPF Final    Service Cmt: 05/08/2023    Final    Reflexive Urine Culture 05/08/2023    Final    Creatinine, Urine  2023 132  20 - 275 mg/dL Final    Microalb, Ur 2023 1.6  See Note: mg/dL Final    Microalb/Creat Ratio 2023 12  <30 mcg/mg creat Final       Past Medical History:   Diagnosis Date    Bradycardia     Hypertension     Thyroid disease      Social History     Socioeconomic History    Marital status:    Tobacco Use    Smoking status: Former     Packs/day: 0.50     Years: 10.00     Pack years: 5.00     Types: Cigarettes     Start date:      Quit date:      Years since quittin.5     Passive exposure: Past    Smokeless tobacco: Never   Substance and Sexual Activity    Alcohol use: Yes     Comment: occassional    Drug use: Not Currently     Past Surgical History:   Procedure Laterality Date    ARTHROSCOPY OF KNEE Left 2019    Procedure: ARTHROSCOPY, KNEE;  Surgeon: Porter Cifuentes MD;  Location: [facility];  Service: Orthopedics;  Laterality: Left;    ATRIAL CARDIAC PACEMAKER INSERTION      EXCISION OF MEDIAL MENISCUS OF KNEE Left 2019    Procedure: MENISCECTOMY, KNEE,  partial,MEDIAL,lateral;  Surgeon: Porter Cifuentes MD;  Location: [facility];  Service: Orthopedics;  Laterality: Left;    HYSTERECTOMY      INSERTION OF PACEMAKER Left 2014    KNEE ARTHROSCOPY      left shoulder      SURGICAL REMOVAL OF BONE SPUR Left     Removed from leftr shoulder     Family History   Problem Relation Age of Onset    Diabetes Mother     No Known Problems Father     Heart disease Maternal Aunt     Diabetes Maternal Grandmother     Breast cancer Maternal Grandmother     Diabetes Maternal Grandfather        Review of patient's allergies indicates:   Allergen Reactions    Comtrex Other (See Comments)       Current Outpatient Medications:     ALPRAZolam (XANAX) 0.25 MG tablet, , Disp: , Rfl:     ascorbic acid, vitamin C, (VITAMIN C) 500 MG tablet, Take 500 mg by mouth every evening. , Disp: , Rfl:     estradioL (ESTRACE) 2 MG tablet, Take 0.5 tablets (1 mg total) by mouth every evening., Disp: 90  "tablet, Rfl: 0    KRILL OIL ORAL, Take 500 mg by mouth. Mon, Tues, Wed, Thurs, Fri, Sat, Disp: , Rfl:     meclizine (ANTIVERT) 25 mg tablet, Take 1 tablet (25 mg total) by mouth 3 (three) times daily as needed., Disp: 30 tablet, Rfl: 3    metoprolol succinate (TOPROL-XL) 25 MG 24 hr tablet, Take 25 mg by mouth once daily., Disp: , Rfl:     multivitamin (THERAGRAN) per tablet, Take 0.5 capsules by mouth every evening. , Disp: , Rfl:     potassium gluconate 595 mg (99 mg) Tab, Take 1 tablet by mouth every evening., Disp: , Rfl:     thyroid, pork, (ARMOUR THYROID) 90 mg Tab, Take 1 tablet (90 mg total) by mouth before breakfast., Disp: 90 tablet, Rfl: 0    vitamin D (VITAMIN D3) 1000 units Tab, Take 1,000 Units by mouth every evening. , Disp: , Rfl:     Review of Systems   Constitutional:  Negative for chills and fever.   HENT:  Negative for congestion, ear discharge and ear pain.    Eyes:  Negative for discharge.   Respiratory:  Negative for cough and shortness of breath.    Cardiovascular:  Negative for chest pain and leg swelling.   Skin:  Positive for rash.        Objective:      Vitals:    07/10/23 0916 07/10/23 0922 07/10/23 0945   BP: (!) 142/90 (!) 146/90 136/82   Pulse: 65     SpO2: 97%     Weight: 92.3 kg (203 lb 6.4 oz)     Height: 5' 9.5" (1.765 m)       Physical Exam  Vitals and nursing note reviewed.   Constitutional:       General: She is not in acute distress.     Appearance: Normal appearance. She is well-developed. She is not ill-appearing.   HENT:      Mouth/Throat:      Tonsils: No tonsillar exudate.   Cardiovascular:      Rate and Rhythm: Normal rate and regular rhythm.      Heart sounds: Normal heart sounds.   Pulmonary:      Breath sounds: Normal breath sounds.   Lymphadenopathy:      Cervical: No cervical adenopathy.   Skin:            Comments: Right side of buttock with batch of vesicle culture obtained   Neurological:      Mental Status: She is alert.         Assessment:       1. Skin lesion " of right leg    2. Menopause    3. High triglycerides         Plan:       Skin lesion of right leg  Comments:  culture obatined  Orders:  -     Herpes simplex virus culture    Menopause  Comments:  decrease dose of estradiol. repeat labs in 3 months.   Orders:  -     estradioL (ESTRACE) 2 MG tablet; Take 0.5 tablets (1 mg total) by mouth every evening.  Dispense: 90 tablet; Refill: 0    High triglycerides  Comments:  diet and exercise      Follow up in about 3 months (around 10/10/2023), or if symptoms worsen or fail to improve, for medication management.        7/14/2023 Julienne Damon

## 2023-07-27 ENCOUNTER — TELEPHONE (OUTPATIENT)
Dept: FAMILY MEDICINE | Facility: CLINIC | Age: 58
End: 2023-07-27

## 2023-10-09 DIAGNOSIS — Z78.0 MENOPAUSE: ICD-10-CM

## 2023-10-09 DIAGNOSIS — E03.9 HYPOTHYROIDISM, UNSPECIFIED TYPE: ICD-10-CM

## 2023-10-09 RX ORDER — ESTRADIOL 2 MG/1
1 TABLET ORAL NIGHTLY
Qty: 30 TABLET | Refills: 0 | Status: SHIPPED | OUTPATIENT
Start: 2023-10-09 | End: 2023-10-23 | Stop reason: SDUPTHER

## 2023-10-09 RX ORDER — THYROID 90 MG/1
90 TABLET ORAL
Qty: 30 TABLET | Refills: 0 | Status: SHIPPED | OUTPATIENT
Start: 2023-10-09 | End: 2023-11-13 | Stop reason: SDUPTHER

## 2023-10-09 NOTE — TELEPHONE ENCOUNTER
----- Message from Kala Aldrich sent at 10/9/2023  9:26 AM CDT -----  Pt needs refill on estradiol 2 mg and armour thyroid 90   [facility]   [phone]

## 2023-10-23 ENCOUNTER — TELEPHONE (OUTPATIENT)
Dept: FAMILY MEDICINE | Facility: CLINIC | Age: 58
End: 2023-10-23

## 2023-10-23 DIAGNOSIS — Z78.0 MENOPAUSE: ICD-10-CM

## 2023-10-23 RX ORDER — ESTRADIOL 2 MG/1
2 TABLET ORAL NIGHTLY
Qty: 30 TABLET | Refills: 0 | Status: SHIPPED | OUTPATIENT
Start: 2023-10-23 | End: 2023-11-13 | Stop reason: SDUPTHER

## 2023-10-23 NOTE — TELEPHONE ENCOUNTER
Spoke with patient who states we had decreased her Estradiol at one point to 1/2 tablet and it just wasn't working for her. She went back to taking a whole tablet in August and now she is going to be out of the medication. The quantity we wrote was correct but states the pharmacy only filled #15 and she is going to be out on Thursday. They need updated directions. Set up only if okay to sent with updated quantity.

## 2023-10-23 NOTE — TELEPHONE ENCOUNTER
----- Message from Marina Whyte sent at 10/23/2023  9:53 AM CDT -----  Please call about a medication Estradiol. Pt's # [phone]

## 2023-11-09 ENCOUNTER — OFFICE VISIT (OUTPATIENT)
Dept: FAMILY MEDICINE | Facility: CLINIC | Age: 58
End: 2023-11-09
Payer: COMMERCIAL

## 2023-11-09 VITALS
SYSTOLIC BLOOD PRESSURE: 138 MMHG | WEIGHT: 190 LBS | HEIGHT: 70 IN | DIASTOLIC BLOOD PRESSURE: 88 MMHG | BODY MASS INDEX: 27.2 KG/M2 | HEART RATE: 76 BPM

## 2023-11-09 DIAGNOSIS — F41.9 ANXIETY: ICD-10-CM

## 2023-11-09 DIAGNOSIS — I10 HYPERTENSION, UNSPECIFIED TYPE: ICD-10-CM

## 2023-11-09 DIAGNOSIS — Z95.0 PACEMAKER: ICD-10-CM

## 2023-11-09 DIAGNOSIS — Z12.31 SCREENING MAMMOGRAM FOR HIGH-RISK PATIENT: Primary | ICD-10-CM

## 2023-11-09 DIAGNOSIS — Z12.11 SPECIAL SCREENING FOR MALIGNANT NEOPLASMS, COLON: ICD-10-CM

## 2023-11-09 DIAGNOSIS — M79.10 MYALGIA DUE TO STATIN: ICD-10-CM

## 2023-11-09 DIAGNOSIS — T46.6X5A MYALGIA DUE TO STATIN: ICD-10-CM

## 2023-11-09 DIAGNOSIS — Z79.899 HIGH RISK MEDICATION USE: ICD-10-CM

## 2023-11-09 DIAGNOSIS — Z12.11 SCREENING FOR COLON CANCER: ICD-10-CM

## 2023-11-09 DIAGNOSIS — Z78.0 MENOPAUSE: ICD-10-CM

## 2023-11-09 DIAGNOSIS — E03.9 HYPOTHYROIDISM, UNSPECIFIED TYPE: ICD-10-CM

## 2023-11-09 DIAGNOSIS — E78.5 HYPERLIPIDEMIA, UNSPECIFIED HYPERLIPIDEMIA TYPE: ICD-10-CM

## 2023-11-09 PROCEDURE — 99214 PR OFFICE/OUTPT VISIT, EST, LEVL IV, 30-39 MIN: ICD-10-PCS | Mod: S$GLB,,, | Performed by: NURSE PRACTITIONER

## 2023-11-09 PROCEDURE — 99214 OFFICE O/P EST MOD 30 MIN: CPT | Mod: S$GLB,,, | Performed by: NURSE PRACTITIONER

## 2023-11-12 LAB
ALBUMIN SERPL-MCNC: 4.3 G/DL (ref 3.8–4.9)
ALBUMIN/GLOB SERPL: 1.9 {RATIO} (ref 1.2–2.2)
ALP SERPL-CCNC: 80 IU/L (ref 44–121)
ALT SERPL-CCNC: 11 IU/L (ref 0–32)
AST SERPL-CCNC: 20 IU/L (ref 0–40)
BILIRUB SERPL-MCNC: 0.3 MG/DL (ref 0–1.2)
BUN SERPL-MCNC: 13 MG/DL (ref 6–24)
BUN/CREAT SERPL: 21 (ref 9–23)
CALCIUM SERPL-MCNC: 9.7 MG/DL (ref 8.7–10.2)
CHLORIDE SERPL-SCNC: 100 MMOL/L (ref 96–106)
CHOLEST SERPL-MCNC: 220 MG/DL (ref 100–199)
CO2 SERPL-SCNC: 22 MMOL/L (ref 20–29)
CREAT SERPL-MCNC: 0.63 MG/DL (ref 0.57–1)
EST. GFR  (NO RACE VARIABLE): 102 ML/MIN/1.73
GLOBULIN SER CALC-MCNC: 2.3 G/DL (ref 1.5–4.5)
GLUCOSE SERPL-MCNC: 85 MG/DL (ref 70–99)
HDLC SERPL-MCNC: 55 MG/DL
LDLC SERPL CALC-MCNC: 109 MG/DL (ref 0–99)
POTASSIUM SERPL-SCNC: 4.3 MMOL/L (ref 3.5–5.2)
PROT SERPL-MCNC: 6.6 G/DL (ref 6–8.5)
SODIUM SERPL-SCNC: 138 MMOL/L (ref 134–144)
TRIGL SERPL-MCNC: 329 MG/DL (ref 0–149)
TSH SERPL DL<=0.005 MIU/L-ACNC: 1.43 UIU/ML (ref 0.45–4.5)
VLDLC SERPL CALC-MCNC: 56 MG/DL (ref 5–40)

## 2023-11-13 DIAGNOSIS — E03.9 HYPOTHYROIDISM, UNSPECIFIED TYPE: ICD-10-CM

## 2023-11-13 DIAGNOSIS — Z78.0 MENOPAUSE: ICD-10-CM

## 2023-11-13 RX ORDER — THYROID 90 MG/1
90 TABLET ORAL
Qty: 30 TABLET | Refills: 5 | Status: SHIPPED | OUTPATIENT
Start: 2023-11-13

## 2023-11-13 RX ORDER — ESTRADIOL 2 MG/1
2 TABLET ORAL NIGHTLY
Qty: 30 TABLET | Refills: 5 | Status: SHIPPED | OUTPATIENT
Start: 2023-11-13

## 2023-11-13 NOTE — TELEPHONE ENCOUNTER
----- Message from Lexy Sarah sent at 11/13/2023  8:41 AM CST -----  Pt states her medications were not sent to pharmacy. [place] thyroid 90 mg, Estradiol. [facility] on [address]. Pt #[phone]

## 2023-11-14 ENCOUNTER — TELEPHONE (OUTPATIENT)
Dept: FAMILY MEDICINE | Facility: CLINIC | Age: 58
End: 2023-11-14

## 2023-11-14 NOTE — TELEPHONE ENCOUNTER
Spoke to pt and let her know per Julienne verbatim below. Pt verbalized understanding and is going to send kit back

## 2023-11-14 NOTE — TELEPHONE ENCOUNTER
----- Message from Kala Aldrich sent at 11/14/2023  8:31 AM CST -----  Pt received cologaurd in the mail and she said she did not want to do it. She would like to talk to jayashree about this   [phone]

## 2023-11-14 NOTE — TELEPHONE ENCOUNTER
If she wants to wait and do the colonoscopy next year and just to the stool for blood this year that is fine. I can cancel the cologuard.

## 2023-11-18 LAB — HEMOCCULT STL QL IA: NEGATIVE

## 2023-11-22 LAB — NONINV COLON CA DNA+OCC BLD SCRN STL QL: NORMAL

## 2023-12-05 ENCOUNTER — TELEPHONE (OUTPATIENT)
Dept: FAMILY MEDICINE | Facility: CLINIC | Age: 58
End: 2023-12-05

## 2023-12-05 NOTE — TELEPHONE ENCOUNTER
----- Message from Julienne Damon NP sent at 12/5/2023  9:33 AM CST -----  Stool is negative for blood. Triglycerides have increased again. Start low fat diet. Rest of labs are ok.

## 2023-12-08 ENCOUNTER — HOSPITAL ENCOUNTER (OUTPATIENT)
Dept: RADIOLOGY | Facility: HOSPITAL | Age: 58
Discharge: HOME | End: 2023-12-08
Attending: NURSE PRACTITIONER
Payer: COMMERCIAL

## 2023-12-08 DIAGNOSIS — Z12.31 SCREENING MAMMOGRAM FOR HIGH-RISK PATIENT: ICD-10-CM

## 2023-12-08 PROCEDURE — 77067 SCR MAMMO BI INCL CAD: CPT | Mod: TC,PO

## 2023-12-14 ENCOUNTER — TELEPHONE (OUTPATIENT)
Dept: FAMILY MEDICINE | Facility: CLINIC | Age: 58
End: 2023-12-14

## 2023-12-14 NOTE — TELEPHONE ENCOUNTER
----- Message from Lexy Sarah sent at 12/14/2023 12:21 PM CST -----  Pt has a 4:00 appt today with Dr. Lynn and needs her labs faxed to them asap. Pt #[phone]

## 2023-12-14 NOTE — TELEPHONE ENCOUNTER
Spoke with pt and informed her that her labs were faxed over to  office. She voiced understanding.

## 2023-12-18 ENCOUNTER — TELEPHONE (OUTPATIENT)
Dept: FAMILY MEDICINE | Facility: CLINIC | Age: 58
End: 2023-12-18

## 2023-12-18 NOTE — PROGRESS NOTES
SUBJECTIVE:    Patient ID: Mariah Ashton is a 58 y.o. female.    Chief Complaint: Hypothyroidism (Brought meds list, no complaints, mammogram ordered, declined flu vaccine, cologuard ordered, abc )    58 year old female presents for check up. Treated for menopause, hypothyroid, palpitations and thoracic aortic aneurysm, htn. . Sees dr. Lynn regularly. Blood pressure his well controlled in office today.  No palpitations. No dizziness. lSleeps well. Still working. Not currently exercising. Taking thyroid medications as prescribed. Due for 6 month follow up labs.         Office Visit on 11/09/2023   Component Date Value Ref Range Status    Cologuard Result 11/22/2023 Sample Could Not Be Processed 9  N/A Final    Glucose 11/11/2023 85  70 - 99 mg/dL Final    BUN 11/11/2023 13  6 - 24 mg/dL Final    Creatinine 11/11/2023 0.63  0.57 - 1.00 mg/dL Final    eGFR 11/11/2023 102  >59 mL/min/1.73 Final    BUN/Creatinine Ratio 11/11/2023 21  9 - 23 Final    Sodium 11/11/2023 138  134 - 144 mmol/L Final    Potassium 11/11/2023 4.3  3.5 - 5.2 mmol/L Final    Chloride 11/11/2023 100  96 - 106 mmol/L Final    CO2 11/11/2023 22  20 - 29 mmol/L Final    Calcium 11/11/2023 9.7  8.7 - 10.2 mg/dL Final    Protein, Total 11/11/2023 6.6  6.0 - 8.5 g/dL Final    Albumin 11/11/2023 4.3  3.8 - 4.9 g/dL Final    Globulin, Total 11/11/2023 2.3  1.5 - 4.5 g/dL Final    Albumin/Globulin Ratio 11/11/2023 1.9  1.2 - 2.2 Final    Total Bilirubin 11/11/2023 0.3  0.0 - 1.2 mg/dL Final    Alkaline Phosphatase 11/11/2023 80  44 - 121 IU/L Final    AST 11/11/2023 20  0 - 40 IU/L Final    ALT 11/11/2023 11  0 - 32 IU/L Final    Cholesterol 11/11/2023 220 (H)  100 - 199 mg/dL Final    Triglycerides 11/11/2023 329 (H)  0 - 149 mg/dL Final    HDL 11/11/2023 55  >39 mg/dL Final    VLDL Cholesterol Bobby 11/11/2023 56 (H)  5 - 40 mg/dL Final    LDL Calculated 11/11/2023 109 (H)  0 - 99 mg/dL Final    TSH 11/11/2023 1.430  0.450 - 4.500 uIU/mL Final     Fecal Globin, Insure 2023 Negative  Negative Final       Past Medical History:   Diagnosis Date    Bradycardia     Hypertension     Thyroid disease      Social History     Socioeconomic History    Marital status:    Tobacco Use    Smoking status: Former     Current packs/day: 0.00     Average packs/day: 0.5 packs/day for 10.0 years (5.0 ttl pk-yrs)     Types: Cigarettes     Start date:      Quit date:      Years since quittin.9     Passive exposure: Past    Smokeless tobacco: Never   Substance and Sexual Activity    Alcohol use: Yes     Comment: occassional    Drug use: Not Currently     Past Surgical History:   Procedure Laterality Date    ARTHROSCOPY OF KNEE Left 2019    Procedure: ARTHROSCOPY, KNEE;  Surgeon: Porter Cifuentes MD;  Location: [facility] OR;  Service: Orthopedics;  Laterality: Left;    ATRIAL CARDIAC PACEMAKER INSERTION      EXCISION OF MEDIAL MENISCUS OF KNEE Left 2019    Procedure: MENISCECTOMY, KNEE,  partial,MEDIAL,lateral;  Surgeon: Porter Cifuentes MD;  Location: [facility] OR;  Service: Orthopedics;  Laterality: Left;    HYSTERECTOMY      INSERTION OF PACEMAKER Left 2014    KNEE ARTHROSCOPY      left shoulder      SURGICAL REMOVAL OF BONE SPUR Left     Removed from leftr shoulder     Family History   Problem Relation Age of Onset    Diabetes Mother     No Known Problems Father     Heart disease Maternal Aunt     Diabetes Maternal Grandmother     Breast cancer Maternal Grandmother     Diabetes Maternal Grandfather        All of your core healthy metrics are met.      Review of patient's allergies indicates:   Allergen Reactions    Comtrex Other (See Comments)       Current Outpatient Medications:     ALPRAZolam (XANAX) 0.25 MG tablet, , Disp: , Rfl:     ascorbic acid, vitamin C, (VITAMIN C) 500 MG tablet, Take 500 mg by mouth every evening. , Disp: , Rfl:     KRILL OIL ORAL, Take 500 mg by mouth. Mon, Tues, Wed, Thurs, Fri, Sat, Disp: , Rfl:     meclizine  "(ANTIVERT) 25 mg tablet, Take 1 tablet (25 mg total) by mouth 3 (three) times daily as needed., Disp: 30 tablet, Rfl: 3    metoprolol succinate (TOPROL-XL) 25 MG 24 hr tablet, Take 25 mg by mouth once daily., Disp: , Rfl:     multivitamin (THERAGRAN) per tablet, Take 0.5 capsules by mouth every evening. , Disp: , Rfl:     potassium gluconate 595 mg (99 mg) Tab, Take 1 tablet by mouth every evening., Disp: , Rfl:     vitamin D (VITAMIN D3) 1000 units Tab, Take 1,000 Units by mouth every evening. , Disp: , Rfl:     estradioL (ESTRACE) 2 MG tablet, Take 1 tablet (2 mg total) by mouth every evening., Disp: 30 tablet, Rfl: 5    thyroid, pork, (ARMOUR THYROID) 90 mg Tab, Take 1 tablet (90 mg total) by mouth before breakfast., Disp: 30 tablet, Rfl: 5    Review of Systems   Constitutional:  Negative for chills, fever and unexpected weight change.   HENT:  Negative for ear pain, rhinorrhea and sore throat.    Eyes:  Negative for pain and visual disturbance.   Respiratory:  Negative for cough and shortness of breath.    Cardiovascular:  Negative for chest pain, palpitations and leg swelling.   Gastrointestinal:  Negative for abdominal pain, diarrhea, nausea and vomiting.   Genitourinary:  Negative for difficulty urinating, hematuria and vaginal bleeding.   Musculoskeletal:  Negative for arthralgias.   Skin:  Negative for rash.   Neurological:  Negative for dizziness, weakness and headaches.   Psychiatric/Behavioral:  Negative for agitation and sleep disturbance. The patient is not nervous/anxious.           Objective:      Vitals:    11/09/23 1049   BP: 138/88   Pulse: 76   Weight: 86.2 kg (190 lb)   Height: 5' 9.5" (1.765 m)     Physical Exam  Vitals and nursing note reviewed.   Constitutional:       General: She is not in acute distress.     Appearance: Normal appearance. She is well-developed.   HENT:      Head: Normocephalic.      Right Ear: External ear normal.      Left Ear: External ear normal.   Eyes:      " Conjunctiva/sclera: Conjunctivae normal.      Pupils: Pupils are equal, round, and reactive to light.   Neck:      Vascular: No JVD.   Cardiovascular:      Rate and Rhythm: Normal rate and regular rhythm.      Heart sounds: No murmur heard.  Pulmonary:      Effort: Pulmonary effort is normal.      Breath sounds: Normal breath sounds.   Abdominal:      General: Bowel sounds are normal.      Palpations: Abdomen is soft.   Musculoskeletal:         General: No deformity. Normal range of motion.      Cervical back: Normal range of motion and neck supple.   Lymphadenopathy:      Cervical: No cervical adenopathy.   Skin:     General: Skin is warm and dry.      Findings: No rash.   Neurological:      Mental Status: She is alert and oriented to person, place, and time.      Gait: Gait normal.   Psychiatric:         Speech: Speech normal.         Behavior: Behavior normal.           Assessment:       1. Screening mammogram for high-risk patient    2. Special screening for malignant neoplasms, colon    3. Hypothyroidism, unspecified type    4. Hyperlipidemia, unspecified hyperlipidemia type    5. High risk medication use    6. Screening for colon cancer    7. Anxiety    8. Hypertension, unspecified type    9. Pacemaker    10. Menopause    11. Myalgia due to statin         Plan:       Screening mammogram for high-risk patient  -     Cancel: Mammo Digital Screening Bilat; Future; Expected date: 11/09/2023  -     Mammo Digital Screening Bilat w/ Bari; Future; Expected date: 11/09/2023    Special screening for malignant neoplasms, colon  -     Cologuard Screening (Multitarget Stool DNA); Future; Expected date: 11/09/2023    Hypothyroidism, unspecified type  Comments:  shadi thyroid 90mg  Orders:  -     TSH w/reflex to FT4; Future; Expected date: 11/09/2023    Hyperlipidemia, unspecified hyperlipidemia type  -     Lipid Panel; Future; Expected date: 11/09/2023    High risk medication use  -     Comprehensive Metabolic Panel;  Future; Expected date: 11/09/2023  -     Lipid Panel; Future; Expected date: 11/09/2023  -     TSH w/reflex to FT4; Future; Expected date: 11/09/2023    Screening for colon cancer  -     Occult Blood, Fecal Immunoassay; Future; Expected date: 11/09/2023    Anxiety  Comments:  xanax prn    Hypertension, unspecified type  Comments:  bp is well controlled    Pacemaker  Comments:  sees dr. her    Menopause  Comments:  estradiol    Myalgia due to statin  Comments:  low cholesterol diet and exericse      Follow up in about 6 months (around 5/9/2024), or if symptoms worsen or fail to improve, for medication management.        12/18/2023 Julienne Damon

## 2024-02-26 ENCOUNTER — TELEPHONE (OUTPATIENT)
Dept: FAMILY MEDICINE | Facility: CLINIC | Age: 59
End: 2024-02-26

## 2024-02-26 ENCOUNTER — OFFICE VISIT (OUTPATIENT)
Dept: FAMILY MEDICINE | Facility: CLINIC | Age: 59
End: 2024-02-26
Payer: COMMERCIAL

## 2024-02-26 VITALS
OXYGEN SATURATION: 96 % | TEMPERATURE: 99 F | SYSTOLIC BLOOD PRESSURE: 130 MMHG | HEART RATE: 60 BPM | WEIGHT: 198 LBS | BODY MASS INDEX: 29.33 KG/M2 | DIASTOLIC BLOOD PRESSURE: 68 MMHG | HEIGHT: 69 IN

## 2024-02-26 DIAGNOSIS — J32.9 SINUSITIS, UNSPECIFIED CHRONICITY, UNSPECIFIED LOCATION: ICD-10-CM

## 2024-02-26 DIAGNOSIS — Z95.0 PACEMAKER: Primary | ICD-10-CM

## 2024-02-26 DIAGNOSIS — Z78.0 MENOPAUSE: ICD-10-CM

## 2024-02-26 DIAGNOSIS — E03.9 HYPOTHYROIDISM, UNSPECIFIED TYPE: ICD-10-CM

## 2024-02-26 PROCEDURE — 99213 OFFICE O/P EST LOW 20 MIN: CPT | Mod: S$GLB,,, | Performed by: NURSE PRACTITIONER

## 2024-02-26 RX ORDER — AMOXICILLIN 500 MG/1
500 CAPSULE ORAL EVERY 8 HOURS
Qty: 30 CAPSULE | Refills: 0 | Status: SHIPPED | OUTPATIENT
Start: 2024-02-26

## 2024-02-26 NOTE — TELEPHONE ENCOUNTER
----- Message from Marina Whyte sent at 2/26/2024  8:04 AM CST -----  The patient has head and chest congestion and a dry cough. She would like to be seen today. pt's # [phone]  [facility]     
240 today. Come now  
Spoke to pt and she is coming  
Spoke to pt and she stated she has been having sinus congestion for the last week. Complain of a dry cough at night. Pt stated sometime her nose is running and then congestion. Pt stated every since the pollen has started she has been sick on and off. Over the counter cough syrup and allergy meds   
negative...

## 2024-03-02 NOTE — PROGRESS NOTES
SUBJECTIVE:    Patient ID: Mariah Ashton is a 58 y.o. female.    Chief Complaint: Cough (No bottles, pt complains sinus congestion started 2 weeks ago, tried several over the counter medications - allergy relief, Sinus PE + Allergy and nasal decongestant, no fever, Pt did home covid test and it was negative on Monday of last week//BA )    58 year old female presents for urgent visit. Treated for menopause, hypothyroid, palpitations and thoracic aortic aneurysm, htn. . Sees dr. Lynn regularly.   She is complaining today of sinus congestion that started 2 weeks. Does not seem to be improving. No fever. Post nasal drip. Dry cough. Some sore throat. Has tried otc meds without improvement. Pt did home covid test and it was negative on Monday of last week//BA             Office Visit on 11/09/2023   Component Date Value Ref Range Status    Cologuard Result 11/22/2023 Sample Could Not Be Processed 9  N/A Final    Glucose 11/11/2023 85  70 - 99 mg/dL Final    BUN 11/11/2023 13  6 - 24 mg/dL Final    Creatinine 11/11/2023 0.63  0.57 - 1.00 mg/dL Final    eGFR 11/11/2023 102  >59 mL/min/1.73 Final    BUN/Creatinine Ratio 11/11/2023 21  9 - 23 Final    Sodium 11/11/2023 138  134 - 144 mmol/L Final    Potassium 11/11/2023 4.3  3.5 - 5.2 mmol/L Final    Chloride 11/11/2023 100  96 - 106 mmol/L Final    CO2 11/11/2023 22  20 - 29 mmol/L Final    Calcium 11/11/2023 9.7  8.7 - 10.2 mg/dL Final    Protein, Total 11/11/2023 6.6  6.0 - 8.5 g/dL Final    Albumin 11/11/2023 4.3  3.8 - 4.9 g/dL Final    Globulin, Total 11/11/2023 2.3  1.5 - 4.5 g/dL Final    Albumin/Globulin Ratio 11/11/2023 1.9  1.2 - 2.2 Final    Total Bilirubin 11/11/2023 0.3  0.0 - 1.2 mg/dL Final    Alkaline Phosphatase 11/11/2023 80  44 - 121 IU/L Final    AST 11/11/2023 20  0 - 40 IU/L Final    ALT 11/11/2023 11  0 - 32 IU/L Final    Cholesterol 11/11/2023 220 (H)  100 - 199 mg/dL Final    Triglycerides 11/11/2023 329 (H)  0 - 149 mg/dL Final    HDL  2023 55  >39 mg/dL Final    VLDL Cholesterol Bobby 2023 56 (H)  5 - 40 mg/dL Final    LDL Calculated 2023 109 (H)  0 - 99 mg/dL Final    TSH 2023 1.430  0.450 - 4.500 uIU/mL Final    Fecal Globin, Insure 2023 Negative  Negative Final       Past Medical History:   Diagnosis Date    Bradycardia     Hypertension     Thyroid disease      Social History     Socioeconomic History    Marital status:    Tobacco Use    Smoking status: Former     Current packs/day: 0.00     Average packs/day: 0.5 packs/day for 10.0 years (5.0 ttl pk-yrs)     Types: Cigarettes     Start date:      Quit date:      Years since quittin.1     Passive exposure: Past    Smokeless tobacco: Never   Substance and Sexual Activity    Alcohol use: Yes     Comment: occassional    Drug use: Not Currently     Past Surgical History:   Procedure Laterality Date    ARTHROSCOPY OF KNEE Left 2019    Procedure: ARTHROSCOPY, KNEE;  Surgeon: Porter Cifuentes MD;  Location: [facility] OR;  Service: Orthopedics;  Laterality: Left;    ATRIAL CARDIAC PACEMAKER INSERTION      EXCISION OF MEDIAL MENISCUS OF KNEE Left 2019    Procedure: MENISCECTOMY, KNEE,  partial,MEDIAL,lateral;  Surgeon: Porter Cifuentes MD;  Location: [facility] OR;  Service: Orthopedics;  Laterality: Left;    HYSTERECTOMY      INSERTION OF PACEMAKER Left 2014    KNEE ARTHROSCOPY      left shoulder      SURGICAL REMOVAL OF BONE SPUR Left 2016    Removed from leftr shoulder     Family History   Problem Relation Age of Onset    Diabetes Mother     No Known Problems Father     Heart disease Maternal Aunt     Diabetes Maternal Grandmother     Breast cancer Maternal Grandmother     Diabetes Maternal Grandfather        All of your core healthy metrics are met.      Review of patient's allergies indicates:   Allergen Reactions    Comtrex Other (See Comments)       Current Outpatient Medications:     ALPRAZolam (XANAX) 0.25 MG tablet, , Disp: , Rfl:     ascorbic  "acid, vitamin C, (VITAMIN C) 500 MG tablet, Take 500 mg by mouth every evening. , Disp: , Rfl:     estradioL (ESTRACE) 2 MG tablet, Take 1 tablet (2 mg total) by mouth every evening., Disp: 30 tablet, Rfl: 5    KRILL OIL ORAL, Take 500 mg by mouth. Mon, Tues, Wed, Thurs, Fri, Sat, Disp: , Rfl:     meclizine (ANTIVERT) 25 mg tablet, Take 1 tablet (25 mg total) by mouth 3 (three) times daily as needed., Disp: 30 tablet, Rfl: 3    metoprolol succinate (TOPROL-XL) 25 MG 24 hr tablet, Take 25 mg by mouth once daily., Disp: , Rfl:     multivitamin (THERAGRAN) per tablet, Take 0.5 capsules by mouth every evening. , Disp: , Rfl:     potassium gluconate 595 mg (99 mg) Tab, Take 1 tablet by mouth every evening., Disp: , Rfl:     thyroid, pork, (ARMOUR THYROID) 90 mg Tab, Take 1 tablet (90 mg total) by mouth before breakfast., Disp: 30 tablet, Rfl: 5    vitamin D (VITAMIN D3) 1000 units Tab, Take 1,000 Units by mouth every evening. , Disp: , Rfl:     amoxicillin (AMOXIL) 500 MG capsule, Take 1 capsule (500 mg total) by mouth every 8 (eight) hours., Disp: 30 capsule, Rfl: 0    Review of Systems   Constitutional:  Negative for chills and fever.   HENT:  Positive for congestion and sinus pressure. Negative for ear discharge, ear pain and sore throat.    Eyes:  Negative for discharge.   Respiratory:  Positive for cough. Negative for shortness of breath.    Cardiovascular:  Negative for chest pain and leg swelling.          Objective:      Vitals:    02/26/24 1137   BP: 130/68   Pulse: 60   Temp: 98.5 °F (36.9 °C)   SpO2: 96%   Weight: 89.8 kg (198 lb)   Height: 5' 9" (1.753 m)     Physical Exam  Vitals and nursing note reviewed.   Constitutional:       General: She is not in acute distress.     Appearance: Normal appearance. She is well-developed. She is not ill-appearing.   HENT:      Right Ear: External ear normal.      Left Ear: External ear normal.      Nose: Congestion and rhinorrhea present.      Right Sinus: Frontal sinus " tenderness present.      Left Sinus: Frontal sinus tenderness present.      Mouth/Throat:      Pharynx: Posterior oropharyngeal erythema present.      Tonsils: No tonsillar exudate.   Cardiovascular:      Rate and Rhythm: Normal rate and regular rhythm.      Heart sounds: Normal heart sounds.   Pulmonary:      Breath sounds: Normal breath sounds.   Lymphadenopathy:      Cervical: No cervical adenopathy.   Neurological:      Mental Status: She is alert.           Assessment:       1. Pacemaker    2. Sinusitis, unspecified chronicity, unspecified location    3. Hypothyroidism, unspecified type    4. Menopause         Plan:       Pacemaker  Comments:  managed by dr. her    Sinusitis, unspecified chronicity, unspecified location  Comments:  amoxil    Hypothyroidism, unspecified type  Comments:  armour thyroid    Menopause    Other orders  -     amoxicillin (AMOXIL) 500 MG capsule; Take 1 capsule (500 mg total) by mouth every 8 (eight) hours.  Dispense: 30 capsule; Refill: 0      Follow up if symptoms worsen or fail to improve, for medication management.        3/2/2024 Julienne Damon

## 2024-04-30 ENCOUNTER — TELEPHONE (OUTPATIENT)
Dept: FAMILY MEDICINE | Facility: CLINIC | Age: 59
End: 2024-04-30
Payer: COMMERCIAL

## 2024-04-30 DIAGNOSIS — Z00.00 ROUTINE GENERAL MEDICAL EXAMINATION AT A HEALTH CARE FACILITY: ICD-10-CM

## 2024-04-30 DIAGNOSIS — Z79.899 ENCOUNTER FOR LONG-TERM (CURRENT) USE OF MEDICATIONS: ICD-10-CM

## 2024-04-30 DIAGNOSIS — E03.9 HYPOTHYROIDISM, UNSPECIFIED TYPE: Primary | ICD-10-CM

## 2024-04-30 DIAGNOSIS — I10 HYPERTENSION, UNSPECIFIED TYPE: ICD-10-CM

## 2024-04-30 DIAGNOSIS — E78.5 HYPERLIPIDEMIA, UNSPECIFIED HYPERLIPIDEMIA TYPE: ICD-10-CM

## 2024-05-09 ENCOUNTER — OFFICE VISIT (OUTPATIENT)
Dept: FAMILY MEDICINE | Facility: CLINIC | Age: 59
End: 2024-05-09
Payer: COMMERCIAL

## 2024-05-09 VITALS
HEIGHT: 69 IN | BODY MASS INDEX: 29.77 KG/M2 | OXYGEN SATURATION: 97 % | SYSTOLIC BLOOD PRESSURE: 128 MMHG | WEIGHT: 201 LBS | DIASTOLIC BLOOD PRESSURE: 80 MMHG | HEART RATE: 67 BPM

## 2024-05-09 DIAGNOSIS — Z95.0 PACEMAKER: ICD-10-CM

## 2024-05-09 DIAGNOSIS — E03.9 HYPOTHYROIDISM, UNSPECIFIED TYPE: ICD-10-CM

## 2024-05-09 DIAGNOSIS — Z00.00 PHYSICAL EXAM: ICD-10-CM

## 2024-05-09 DIAGNOSIS — Z78.0 MENOPAUSE: ICD-10-CM

## 2024-05-09 DIAGNOSIS — Z79.899 HIGH RISK MEDICATION USE: Primary | ICD-10-CM

## 2024-05-09 PROCEDURE — 99214 OFFICE O/P EST MOD 30 MIN: CPT | Mod: S$GLB,,, | Performed by: NURSE PRACTITIONER

## 2024-05-09 NOTE — PROGRESS NOTES
SUBJECTIVE:    Patient ID: Mariah Ashton is a 58 y.o. female.    Chief Complaint: Follow-up (No bottles//Pt here for 6 mo follow up//JL)    58 year old female presents for check up. Treated for menopause, hypothyroid, palpitations and thoracic aortic aneurysm, htn. . Sees dr. Lynn regularly. Blood pressure his well controlled in office today.  No palpitations. No dizziness. No shortness of breath. Taking meds as prescribed.  Plans to have labs today. Not watching diet as much. Plans to start watching diet and exercising    Follow-up  Pertinent negatives include no abdominal pain, arthralgias, chest pain, chills, coughing, fever, headaches, nausea, rash, sore throat, vomiting or weakness.       Telephone on 04/30/2024   Component Date Value Ref Range Status    WBC 05/09/2024 6.3  3.8 - 10.8 Thousand/uL Final    RBC 05/09/2024 4.58  3.80 - 5.10 Million/uL Final    Hemoglobin 05/09/2024 13.6  11.7 - 15.5 g/dL Final    Hematocrit 05/09/2024 42.1  35.0 - 45.0 % Final    MCV 05/09/2024 91.9  80.0 - 100.0 fL Final    MCH 05/09/2024 29.7  27.0 - 33.0 pg Final    MCHC 05/09/2024 32.3  32.0 - 36.0 g/dL Final    RDW 05/09/2024 13.1  11.0 - 15.0 % Final    Platelets 05/09/2024 288  140 - 400 Thousand/uL Final    MPV 05/09/2024 9.3  7.5 - 12.5 fL Final    Neutrophils, Abs 05/09/2024 3,320  1,500 - 7,800 cells/uL Final    Lymph # 05/09/2024 2,325  850 - 3,900 cells/uL Final    Mono # 05/09/2024 428  200 - 950 cells/uL Final    Eos # 05/09/2024 176  15 - 500 cells/uL Final    Baso # 05/09/2024 50  0 - 200 cells/uL Final    Neutrophils Relative 05/09/2024 52.7  % Final    Lymph % 05/09/2024 36.9  % Final    Mono % 05/09/2024 6.8  % Final    Eosinophil % 05/09/2024 2.8  % Final    Basophil % 05/09/2024 0.8  % Final    Glucose 05/09/2024 81  65 - 99 mg/dL Final    BUN 05/09/2024 14  7 - 25 mg/dL Final    Creatinine 05/09/2024 0.57  0.50 - 1.03 mg/dL Final    eGFR 05/09/2024 105  > OR = 60 mL/min/1.73m2  Final    BUN/Creatinine Ratio 05/09/2024 SEE NOTE:  6 - 22 (calc) Final    Sodium 05/09/2024 139  135 - 146 mmol/L Final    Potassium 05/09/2024 4.5  3.5 - 5.3 mmol/L Final    Chloride 05/09/2024 102  98 - 110 mmol/L Final    CO2 05/09/2024 28  20 - 32 mmol/L Final    Calcium 05/09/2024 9.4  8.6 - 10.4 mg/dL Final    Total Protein 05/09/2024 6.5  6.1 - 8.1 g/dL Final    Albumin 05/09/2024 3.9  3.6 - 5.1 g/dL Final    Globulin, Total 05/09/2024 2.6  1.9 - 3.7 g/dL (calc) Final    Albumin/Globulin Ratio 05/09/2024 1.5  1.0 - 2.5 (calc) Final    Total Bilirubin 05/09/2024 0.4  0.2 - 1.2 mg/dL Final    Alkaline Phosphatase 05/09/2024 64  37 - 153 U/L Final    AST 05/09/2024 15  10 - 35 U/L Final    ALT 05/09/2024 10  6 - 29 U/L Final    Cholesterol 05/09/2024 203 (H)  <200 mg/dL Final    HDL 05/09/2024 62  > OR = 50 mg/dL Final    Triglycerides 05/09/2024 317 (H)  <150 mg/dL Final    LDL Cholesterol 05/09/2024 99  mg/dL (calc) Final    HDL/Cholesterol Ratio 05/09/2024 3.3  <5.0 (calc) Final    Non HDL Chol. (LDL+VLDL) 05/09/2024 141 (H)  <130 mg/dL (calc) Final    TSH w/reflex to FT4 05/09/2024 2.73  0.40 - 4.50 mIU/L Final    Color, UA 05/09/2024 YELLOW  YELLOW Final    Appearance, UA 05/09/2024 CLEAR  CLEAR Final    Specific Gravity, UA 05/09/2024 1.018  1.001 - 1.035 Final    pH, UA 05/09/2024 8.5 (H)  5.0 - 8.0 Final    Glucose, UA 05/09/2024 NEGATIVE  NEGATIVE Final    Bilirubin, UA 05/09/2024 NEGATIVE  NEGATIVE Final    Ketones, UA 05/09/2024 NEGATIVE  NEGATIVE Final    Occult Blood UA 05/09/2024 NEGATIVE  NEGATIVE Final    Protein, UA 05/09/2024 NEGATIVE  NEGATIVE Final    Nitrite, UA 05/09/2024 NEGATIVE  NEGATIVE Final    Leukocytes, UA 05/09/2024 NEGATIVE  NEGATIVE Final    WBC Casts, UA 05/09/2024 NONE SEEN  < OR = 5 /HPF Final    RBC Casts, UA 05/09/2024 0-2  < OR = 2 /HPF Final    Squam Epithel, UA 05/09/2024 0-5  < OR = 5 /HPF Final    Bacteria, UA 05/09/2024 FEW (A)   NONE SEEN /HPF Final    Hyaline Casts, UA 05/09/2024 NONE SEEN  NONE SEEN /LPF Final    Service Cmt: 05/09/2024    Final    Reflexive Urine Culture 05/09/2024    Final    Creatinine, Urine 05/09/2024 118  20 - 275 mg/dL Final    Microalb, Ur 05/09/2024 0.7  See Note: mg/dL Final    Microalb/Creat Ratio 05/09/2024 6  <30 mg/g creat Final       Past Medical History:   Diagnosis Date    Bradycardia     Hypertension     Thyroid disease      Past Surgical History:   Procedure Laterality Date    ARTHROSCOPY OF KNEE Left 8/21/2019    Procedure: ARTHROSCOPY, KNEE;  Surgeon: Porter Cifuentes MD;  Location: [facility] OR;  Service: Orthopedics;  Laterality: Left;    ATRIAL CARDIAC PACEMAKER INSERTION      EXCISION OF MEDIAL MENISCUS OF KNEE Left 8/21/2019    Procedure: MENISCECTOMY, KNEE,  partial,MEDIAL,lateral;  Surgeon: Porter Cifuentes MD;  Location[facility];  Service: Orthopedics;  Laterality: Left;    HYSTERECTOMY  1999    INSERTION OF PACEMAKER Left 12/2014    KNEE ARTHROSCOPY      left shoulder      SURGICAL REMOVAL OF BONE SPUR Left 2016    Removed from leftr shoulder     Family History   Problem Relation Name Age of Onset    Diabetes Mother      No Known Problems Father      Heart disease Maternal Aunt      Diabetes Maternal Grandmother      Breast cancer Maternal Grandmother      Diabetes Maternal Grandfather         All of your core healthy metrics are met.      The 10-year CVD risk score (D'Agostino, et al., 2008) is: 8.1%    Values used to calculate the score:      Age: 58 years      Sex: Female      Diabetic: No      Tobacco smoker: No      Systolic Blood Pressure: 128 mmHg      Is BP treated: Yes      HDL Cholesterol: 62 mg/dL      Total Cholesterol: 203 mg/dL     Marital Status:   Alcohol History:  reports current alcohol use.  Tobacco History:  reports that she quit smoking about 29 years ago. Her smoking use included cigarettes. She started smoking about 39 years ago. She has a 5 pack-year  smoking history. She has been exposed to tobacco smoke. She has never used smokeless tobacco.  Drug History:  reports that she does not currently use drugs.    Health Maintenance Topics with due status: Not Due       Topic Last Completion Date    Colorectal Cancer Screening 11/16/2023    Mammogram 12/08/2023    Lipid Panel 05/09/2024    Influenza Vaccine Not Due       There is no immunization history on file for this patient.    Review of patient's allergies indicates:   Allergen Reactions    Comtrex Other (See Comments)       Current Outpatient Medications:     ALPRAZolam (XANAX) 0.25 MG tablet, , Disp: , Rfl:     ascorbic acid, vitamin C, (VITAMIN C) 500 MG tablet, Take 500 mg by mouth every evening. , Disp: , Rfl:     KRILL OIL ORAL, Take 500 mg by mouth. Mon, Tues, Wed, Thurs, Fri, Sat, Disp: , Rfl:     meclizine (ANTIVERT) 25 mg tablet, Take 1 tablet (25 mg total) by mouth 3 (three) times daily as needed., Disp: 30 tablet, Rfl: 3    metoprolol succinate (TOPROL-XL) 25 MG 24 hr tablet, Take 25 mg by mouth once daily., Disp: , Rfl:     multivitamin (THERAGRAN) per tablet, Take 0.5 capsules by mouth every evening. , Disp: , Rfl:     potassium gluconate 595 mg (99 mg) Tab, Take 1 tablet by mouth every evening., Disp: , Rfl:     vitamin D (VITAMIN D3) 1000 units Tab, Take 1,000 Units by mouth every evening. , Disp: , Rfl:     amoxicillin (AMOXIL) 500 MG capsule, Take 1 capsule (500 mg total) by mouth every 8 (eight) hours. (Patient not taking: Reported on 5/9/2024), Disp: 30 capsule, Rfl: 0    estradioL (ESTRACE) 2 MG tablet, Take 1 tablet (2 mg total) by mouth every evening., Disp: 30 tablet, Rfl: 5    thyroid, pork, (ARMOUR THYROID) 90 mg Tab, Take 1 tablet (90 mg total) by mouth before breakfast., Disp: 30 tablet, Rfl: 5    Review of Systems   Constitutional:  Negative for chills, fever and unexpected weight change.   HENT:  Negative for ear pain, rhinorrhea and sore throat.    Eyes:  Negative for  "pain and visual disturbance.   Respiratory:  Negative for cough and shortness of breath.    Cardiovascular:  Negative for chest pain, palpitations and leg swelling.   Gastrointestinal:  Negative for abdominal pain, diarrhea, nausea and vomiting.   Genitourinary:  Negative for difficulty urinating, hematuria and vaginal bleeding.   Musculoskeletal:  Negative for arthralgias.   Skin:  Negative for rash.   Neurological:  Negative for dizziness, weakness and headaches.   Psychiatric/Behavioral:  Negative for agitation and sleep disturbance. The patient is not nervous/anxious.           Objective:      Vitals:    05/09/24 0902   BP: 128/80   Pulse: 67   SpO2: 97%   Weight: 91.2 kg (201 lb)   Height: 5' 9" (1.753 m)     Physical Exam  Vitals and nursing note reviewed.   Constitutional:       General: She is not in acute distress.     Appearance: Normal appearance. She is well-developed.   HENT:      Head: Normocephalic.      Right Ear: External ear normal.      Left Ear: External ear normal.   Eyes:      Conjunctiva/sclera: Conjunctivae normal.      Pupils: Pupils are equal, round, and reactive to light.   Neck:      Vascular: No JVD.   Cardiovascular:      Rate and Rhythm: Normal rate and regular rhythm.      Heart sounds: No murmur heard.  Pulmonary:      Effort: Pulmonary effort is normal.      Breath sounds: Normal breath sounds.   Abdominal:      General: Bowel sounds are normal.      Palpations: Abdomen is soft.   Musculoskeletal:         General: No deformity. Normal range of motion.      Cervical back: Normal range of motion and neck supple.   Lymphadenopathy:      Cervical: No cervical adenopathy.   Skin:     General: Skin is warm and dry.      Findings: No rash.   Neurological:      Mental Status: She is alert and oriented to person, place, and time.      Gait: Gait normal.   Psychiatric:         Speech: Speech normal.         Behavior: Behavior normal.         Assessment:       1. High risk medication use    2. " Physical exam    3. Hypothyroidism, unspecified type    4. Pacemaker    5. Menopause           Plan:       1. High risk medication use  -     GROUP & RH; Future; Expected date: 05/09/2024    2. Physical exam  Comments:  labs today  Orders:  -     GROUP & RH; Future; Expected date: 05/09/2024    3. Hypothyroidism, unspecified type  Comments:  continue current dose    4. Pacemaker  Comments:  followed by dr. her    5. Menopause  Comments:  estradiol      Follow up in about 6 months (around 11/9/2024), or if symptoms worsen or fail to improve, for medication management.          Counseled on age and gender appropriate medical preventative services, including cancer screenings, immunizations, overall nutritional health, need for a consistent exercise regimen and an overall push towards maintaining a vigorous and active lifestyle.      5/27/2024 Julienne Damon NP

## 2024-05-10 LAB
ALBUMIN SERPL-MCNC: 3.9 G/DL (ref 3.6–5.1)
ALBUMIN/CREAT UR: 6 MG/G CREAT
ALBUMIN/GLOB SERPL: 1.5 (CALC) (ref 1–2.5)
ALP SERPL-CCNC: 64 U/L (ref 37–153)
ALT SERPL-CCNC: 10 U/L (ref 6–29)
APPEARANCE UR: CLEAR
AST SERPL-CCNC: 15 U/L (ref 10–35)
BACTERIA #/AREA URNS HPF: ABNORMAL /HPF
BACTERIA UR CULT: ABNORMAL
BASOPHILS # BLD AUTO: 50 CELLS/UL (ref 0–200)
BASOPHILS NFR BLD AUTO: 0.8 %
BILIRUB SERPL-MCNC: 0.4 MG/DL (ref 0.2–1.2)
BILIRUB UR QL STRIP: NEGATIVE
BUN SERPL-MCNC: 14 MG/DL (ref 7–25)
BUN/CREAT SERPL: NORMAL (CALC) (ref 6–22)
CALCIUM SERPL-MCNC: 9.4 MG/DL (ref 8.6–10.4)
CHLORIDE SERPL-SCNC: 102 MMOL/L (ref 98–110)
CHOLEST SERPL-MCNC: 203 MG/DL
CHOLEST/HDLC SERPL: 3.3 (CALC)
CO2 SERPL-SCNC: 28 MMOL/L (ref 20–32)
COLOR UR: YELLOW
CREAT SERPL-MCNC: 0.57 MG/DL (ref 0.5–1.03)
CREAT UR-MCNC: 118 MG/DL (ref 20–275)
EGFR: 105 ML/MIN/1.73M2
EOSINOPHIL # BLD AUTO: 176 CELLS/UL (ref 15–500)
EOSINOPHIL NFR BLD AUTO: 2.8 %
ERYTHROCYTE [DISTWIDTH] IN BLOOD BY AUTOMATED COUNT: 13.1 % (ref 11–15)
GLOBULIN SER CALC-MCNC: 2.6 G/DL (CALC) (ref 1.9–3.7)
GLUCOSE SERPL-MCNC: 81 MG/DL (ref 65–99)
GLUCOSE UR QL STRIP: NEGATIVE
HCT VFR BLD AUTO: 42.1 % (ref 35–45)
HDLC SERPL-MCNC: 62 MG/DL
HGB BLD-MCNC: 13.6 G/DL (ref 11.7–15.5)
HGB UR QL STRIP: NEGATIVE
HYALINE CASTS #/AREA URNS LPF: ABNORMAL /LPF
KETONES UR QL STRIP: NEGATIVE
LDLC SERPL CALC-MCNC: 99 MG/DL (CALC)
LEUKOCYTE ESTERASE UR QL STRIP: NEGATIVE
LYMPHOCYTES # BLD AUTO: 2325 CELLS/UL (ref 850–3900)
LYMPHOCYTES NFR BLD AUTO: 36.9 %
MCH RBC QN AUTO: 29.7 PG (ref 27–33)
MCHC RBC AUTO-ENTMCNC: 32.3 G/DL (ref 32–36)
MCV RBC AUTO: 91.9 FL (ref 80–100)
MICROALBUMIN UR-MCNC: 0.7 MG/DL
MONOCYTES # BLD AUTO: 428 CELLS/UL (ref 200–950)
MONOCYTES NFR BLD AUTO: 6.8 %
NEUTROPHILS # BLD AUTO: 3320 CELLS/UL (ref 1500–7800)
NEUTROPHILS NFR BLD AUTO: 52.7 %
NITRITE UR QL STRIP: NEGATIVE
NONHDLC SERPL-MCNC: 141 MG/DL (CALC)
PH UR STRIP: 8.5 [PH] (ref 5–8)
PLATELET # BLD AUTO: 288 THOUSAND/UL (ref 140–400)
PMV BLD REES-ECKER: 9.3 FL (ref 7.5–12.5)
POTASSIUM SERPL-SCNC: 4.5 MMOL/L (ref 3.5–5.3)
PROT SERPL-MCNC: 6.5 G/DL (ref 6.1–8.1)
PROT UR QL STRIP: NEGATIVE
RBC # BLD AUTO: 4.58 MILLION/UL (ref 3.8–5.1)
RBC #/AREA URNS HPF: ABNORMAL /HPF
SERVICE CMNT-IMP: ABNORMAL
SODIUM SERPL-SCNC: 139 MMOL/L (ref 135–146)
SP GR UR STRIP: 1.02 (ref 1–1.03)
SQUAMOUS #/AREA URNS HPF: ABNORMAL /HPF
TRIGL SERPL-MCNC: 317 MG/DL
TSH SERPL-ACNC: 2.73 MIU/L (ref 0.4–4.5)
WBC # BLD AUTO: 6.3 THOUSAND/UL (ref 3.8–10.8)
WBC #/AREA URNS HPF: ABNORMAL /HPF

## 2024-05-13 DIAGNOSIS — E03.9 HYPOTHYROIDISM, UNSPECIFIED TYPE: ICD-10-CM

## 2024-05-13 DIAGNOSIS — Z78.0 MENOPAUSE: ICD-10-CM

## 2024-05-13 RX ORDER — ESTRADIOL 2 MG/1
2 TABLET ORAL NIGHTLY
Qty: 30 TABLET | Refills: 5 | Status: SHIPPED | OUTPATIENT
Start: 2024-05-13

## 2024-05-13 RX ORDER — THYROID 90 MG/1
90 TABLET ORAL
Qty: 30 TABLET | Refills: 5 | Status: SHIPPED | OUTPATIENT
Start: 2024-05-13

## 2024-05-13 NOTE — TELEPHONE ENCOUNTER
----- Message from Marina Whyte sent at 5/13/2024  9:31 AM CDT -----  The patient saw Julienne Thursday. Her prescriptions were not called in to [facility] on [address]. Estradiol and [place] Thyroid.  Please dill. pt's # [phone] GH

## 2024-05-14 ENCOUNTER — TELEPHONE (OUTPATIENT)
Dept: FAMILY MEDICINE | Facility: CLINIC | Age: 59
End: 2024-05-14
Payer: COMMERCIAL

## 2024-05-14 DIAGNOSIS — E03.9 HYPOTHYROIDISM, UNSPECIFIED TYPE: ICD-10-CM

## 2024-05-14 NOTE — TELEPHONE ENCOUNTER
Why your request was denied:  Coverage for this medication is denied for the following reason(s). We reviewed the information we  received about your condition and circumstances. We used plan approved criteria when making this  decision. The policy states that this medication may be approved when:  -The member is unable to take the required number of formulary alternatives for the given diagnosis  due to an intolerance or contraindication OR  -The member has tried and failed the required number of formulary alternatives.

## 2024-05-14 NOTE — TELEPHONE ENCOUNTER
Based on the policy and the information we received your request is denied. We did not receive  documentation that you meet the criteria outlined above.  Formulary alternatives are: levothyroxine, liothyronine, SYNTHROID. (Requirement: 3 in a class with 3  or more alternatives, 2 in a class with 2 alternatives, or 1 in a class with only 1 alternative.)    PA denied for Thyroid [place]

## 2024-11-11 ENCOUNTER — OFFICE VISIT (OUTPATIENT)
Dept: FAMILY MEDICINE | Facility: CLINIC | Age: 59
End: 2024-11-11
Payer: COMMERCIAL

## 2024-11-11 VITALS
DIASTOLIC BLOOD PRESSURE: 64 MMHG | BODY MASS INDEX: 30.46 KG/M2 | HEART RATE: 70 BPM | OXYGEN SATURATION: 96 % | SYSTOLIC BLOOD PRESSURE: 92 MMHG | WEIGHT: 205.63 LBS | HEIGHT: 69 IN

## 2024-11-11 DIAGNOSIS — Z78.0 MENOPAUSE: ICD-10-CM

## 2024-11-11 DIAGNOSIS — Z12.11 SCREENING FOR COLON CANCER: ICD-10-CM

## 2024-11-11 DIAGNOSIS — E03.9 HYPOTHYROIDISM, UNSPECIFIED TYPE: ICD-10-CM

## 2024-11-11 DIAGNOSIS — Z12.39 ENCOUNTER FOR SCREENING FOR MALIGNANT NEOPLASM OF BREAST, UNSPECIFIED SCREENING MODALITY: Primary | ICD-10-CM

## 2024-11-11 DIAGNOSIS — Z95.0 PACEMAKER: ICD-10-CM

## 2024-11-11 DIAGNOSIS — Z79.899 HIGH RISK MEDICATION USE: ICD-10-CM

## 2024-11-11 DIAGNOSIS — F41.9 ANXIETY: ICD-10-CM

## 2024-11-11 PROCEDURE — 99214 OFFICE O/P EST MOD 30 MIN: CPT | Mod: S$GLB,,, | Performed by: NURSE PRACTITIONER

## 2024-11-11 RX ORDER — METOPROLOL SUCCINATE 50 MG/1
50 TABLET, EXTENDED RELEASE ORAL
COMMUNITY
Start: 2024-09-28

## 2024-11-11 RX ORDER — ESTRADIOL 2 MG/1
2 TABLET ORAL NIGHTLY
Qty: 90 TABLET | Refills: 1 | Status: CANCELLED | OUTPATIENT
Start: 2024-11-11

## 2024-11-11 RX ORDER — THYROID 90 MG/1
90 TABLET ORAL
Qty: 90 TABLET | Refills: 1 | Status: CANCELLED | OUTPATIENT
Start: 2024-11-11

## 2024-11-13 DIAGNOSIS — E03.9 HYPOTHYROIDISM, UNSPECIFIED TYPE: ICD-10-CM

## 2024-11-13 DIAGNOSIS — Z78.0 MENOPAUSE: ICD-10-CM

## 2024-11-13 RX ORDER — ESTRADIOL 2 MG/1
2 TABLET ORAL NIGHTLY
Qty: 90 TABLET | Refills: 1 | Status: SHIPPED | OUTPATIENT
Start: 2024-11-13

## 2024-11-13 RX ORDER — THYROID 90 MG/1
90 TABLET ORAL
Qty: 90 TABLET | Refills: 1 | Status: SHIPPED | OUTPATIENT
Start: 2024-11-13

## 2024-11-13 NOTE — TELEPHONE ENCOUNTER
----- Message from Piedad sent at 11/13/2024  8:37 AM CST -----  Medication has not been sent to the pharmacy yet. Estradiol and armour thyroid 90 days. Pt is out of medication and has expressed this already at her appointment.   [facility] [address]   [phone]

## 2024-11-14 ENCOUNTER — TELEPHONE (OUTPATIENT)
Dept: FAMILY MEDICINE | Facility: CLINIC | Age: 59
End: 2024-11-14
Payer: COMMERCIAL

## 2024-11-14 NOTE — PROGRESS NOTES
"  SUBJECTIVE:    Patient ID: Mariah Ashton is a 59 y.o. female.    Chief Complaint: Follow-up (No bottles, brought list//Pt is here for a follow up and medication management//Referral to GI//KE)    History of Present Illness    CHIEF COMPLAINT:  J59 year old female presents for check up.     MEDICATIONS:  She takes metoprolol 75mg daily (50mg + 25mg tablets) for blood pressure control, thyroid medication 90 mcg daily, and estradiol 90 mcg daily. She requests a 90-day supply of estradiol to avoid monthly pharmacy visits due to previous prescription errors.    PREVENTIVE CARE:  She has an upcoming mammogram scheduled for December. She agrees to undergo Cologuard test for colorectal cancer screening and denies any family history of colorectal cancer. She is due for six-month follow-up labs to recheck cholesterol and triglycerides, with a full panel of fasting labs advised prior to her next appointment in April.    HYPERLIPIDEMIA:  She reports a history of consistently elevated triglycerides, which she believes may be genetic.    SINUS ISSUES:  She experiences intermittent, weather-dependent sinus problems, with tenderness and swelling on the right side, particularly underneath the eye. She denies pain in other sinus regions and reports occasional drainage, currently described as "fine".    SLEEP:  She reports adequate sleep quality.      ROS:  General: -fever, -chills, -fatigue, -weight gain, -weight loss  Eyes: -vision changes, -redness, -discharge  ENT: -ear pain, -nasal congestion, -sore throat, +sinus pressure  Cardiovascular: -chest pain, -palpitations, -lower extremity edema  Respiratory: -cough, -shortness of breath  Gastrointestinal: -abdominal pain, -nausea, -vomiting, -diarrhea, -constipation, -blood in stool  Genitourinary: -dysuria, -hematuria, -frequency  Musculoskeletal: -joint pain, -muscle pain  Skin: -rash, -lesion  Neurological: -headache, -dizziness, -numbness, -tingling  Psychiatric: -anxiety, " -depression, -sleep difficulty         No visits with results within 6 Month(s) from this visit.   Latest known visit with results is:   Telephone on 04/30/2024   Component Date Value Ref Range Status    WBC 05/09/2024 6.3  3.8 - 10.8 Thousand/uL Final    RBC 05/09/2024 4.58  3.80 - 5.10 Million/uL Final    Hemoglobin 05/09/2024 13.6  11.7 - 15.5 g/dL Final    Hematocrit 05/09/2024 42.1  35.0 - 45.0 % Final    MCV 05/09/2024 91.9  80.0 - 100.0 fL Final    MCH 05/09/2024 29.7  27.0 - 33.0 pg Final    MCHC 05/09/2024 32.3  32.0 - 36.0 g/dL Final    RDW 05/09/2024 13.1  11.0 - 15.0 % Final    Platelets 05/09/2024 288  140 - 400 Thousand/uL Final    MPV 05/09/2024 9.3  7.5 - 12.5 fL Final    Neutrophils, Abs 05/09/2024 3,320  1,500 - 7,800 cells/uL Final    Lymph # 05/09/2024 2,325  850 - 3,900 cells/uL Final    Mono # 05/09/2024 428  200 - 950 cells/uL Final    Eos # 05/09/2024 176  15 - 500 cells/uL Final    Baso # 05/09/2024 50  0 - 200 cells/uL Final    Neutrophils Relative 05/09/2024 52.7  % Final    Lymph % 05/09/2024 36.9  % Final    Mono % 05/09/2024 6.8  % Final    Eosinophil % 05/09/2024 2.8  % Final    Basophil % 05/09/2024 0.8  % Final    Glucose 05/09/2024 81  65 - 99 mg/dL Final    BUN 05/09/2024 14  7 - 25 mg/dL Final    Creatinine 05/09/2024 0.57  0.50 - 1.03 mg/dL Final    eGFR 05/09/2024 105  > OR = 60 mL/min/1.73m2 Final    BUN/Creatinine Ratio 05/09/2024 SEE NOTE:  6 - 22 (calc) Final    Sodium 05/09/2024 139  135 - 146 mmol/L Final    Potassium 05/09/2024 4.5  3.5 - 5.3 mmol/L Final    Chloride 05/09/2024 102  98 - 110 mmol/L Final    CO2 05/09/2024 28  20 - 32 mmol/L Final    Calcium 05/09/2024 9.4  8.6 - 10.4 mg/dL Final    Total Protein 05/09/2024 6.5  6.1 - 8.1 g/dL Final    Albumin 05/09/2024 3.9  3.6 - 5.1 g/dL Final    Globulin, Total 05/09/2024 2.6  1.9 - 3.7 g/dL (calc) Final    Albumin/Globulin Ratio 05/09/2024 1.5  1.0 - 2.5 (calc) Final    Total Bilirubin 05/09/2024 0.4  0.2 - 1.2 mg/dL  Final    Alkaline Phosphatase 05/09/2024 64  37 - 153 U/L Final    AST 05/09/2024 15  10 - 35 U/L Final    ALT 05/09/2024 10  6 - 29 U/L Final    Cholesterol 05/09/2024 203 (H)  <200 mg/dL Final    HDL 05/09/2024 62  > OR = 50 mg/dL Final    Triglycerides 05/09/2024 317 (H)  <150 mg/dL Final    LDL Cholesterol 05/09/2024 99  mg/dL (calc) Final    HDL/Cholesterol Ratio 05/09/2024 3.3  <5.0 (calc) Final    Non HDL Chol. (LDL+VLDL) 05/09/2024 141 (H)  <130 mg/dL (calc) Final    TSH w/reflex to FT4 05/09/2024 2.73  0.40 - 4.50 mIU/L Final    Color, UA 05/09/2024 YELLOW  YELLOW Final    Appearance, UA 05/09/2024 CLEAR  CLEAR Final    Specific Gravity, UA 05/09/2024 1.018  1.001 - 1.035 Final    pH, UA 05/09/2024 8.5 (H)  5.0 - 8.0 Final    Glucose, UA 05/09/2024 NEGATIVE  NEGATIVE Final    Bilirubin, UA 05/09/2024 NEGATIVE  NEGATIVE Final    Ketones, UA 05/09/2024 NEGATIVE  NEGATIVE Final    Occult Blood UA 05/09/2024 NEGATIVE  NEGATIVE Final    Protein, UA 05/09/2024 NEGATIVE  NEGATIVE Final    Nitrite, UA 05/09/2024 NEGATIVE  NEGATIVE Final    Leukocytes, UA 05/09/2024 NEGATIVE  NEGATIVE Final    WBC Casts, UA 05/09/2024 NONE SEEN  < OR = 5 /HPF Final    RBC Casts, UA 05/09/2024 0-2  < OR = 2 /HPF Final    Squam Epithel, UA 05/09/2024 0-5  < OR = 5 /HPF Final    Bacteria, UA 05/09/2024 FEW (A)  NONE SEEN /HPF Final    Hyaline Casts, UA 05/09/2024 NONE SEEN  NONE SEEN /LPF Final    Service Cmt: 05/09/2024    Final    Reflexive Urine Culture 05/09/2024    Final    Creatinine, Urine 05/09/2024 118  20 - 275 mg/dL Final    Microalb, Ur 05/09/2024 0.7  See Note: mg/dL Final    Microalb/Creat Ratio 05/09/2024 6  <30 mg/g creat Final       Past Medical History:   Diagnosis Date    Bradycardia     Hypertension     Thyroid disease      Social History     Socioeconomic History    Marital status:    Tobacco Use    Smoking status: Former     Current packs/day: 0.00     Average packs/day: 0.5 packs/day for 10.0 years (5.0  ttl pk-yrs)     Types: Cigarettes     Start date:      Quit date:      Years since quittin.8     Passive exposure: Past    Smokeless tobacco: Never   Substance and Sexual Activity    Alcohol use: Yes     Comment: occassional    Drug use: Not Currently     Social Drivers of Health     Financial Resource Strain: Patient Declined (4/10/2023)    Received from [facility] [employer] [facility] [employer]    Overall Financial Resource Strain (CARDIA)     Difficulty of Paying Living Expenses: Patient declined   Food Insecurity: Patient Declined (4/10/2023)    Received from [facility] [place] [employer]    Hunger Vital Sign     Worried About Running Out of Food in the Last Year: Patient declined     Ran Out of Food in the Last Year: Patient declined   Transportation Needs: No Transportation Needs (4/10/2023)    Received from [facility] [place] [facility] [place] [facility] [employer]    PRAPARE - Transportation     Lack of Transportation (Medical): No     Lack of Transportation (Non-Medical): No   Physical Activity: Unknown (4/10/2023)    Received from [facility] [place] [employer]    Exercise Vital Sign     Days of Exercise per Week: Patient declined   Housing Stability: Unknown (4/10/2023)    Received from [facility] [place] [facility] [employer]    Housing Stability Vital Sign     Unable to Pay for Housing in the Last Year: No     Unstable Housing in the Last Year: Patient refused     Past Surgical History:   Procedure Laterality Date    ARTHROSCOPY OF KNEE Left 2019    Procedure: ARTHROSCOPY, KNEE;  Surgeon: Porter Cifuentes MD;  Location: [facility];  Service: Orthopedics;  Laterality: Left;    ATRIAL CARDIAC PACEMAKER INSERTION      EXCISION OF MEDIAL MENISCUS OF KNEE Left 2019    Procedure: MENISCECTOMY, KNEE,  partial,MEDIAL,lateral;  Surgeon: Porter Cifuentes MD;  Location: [facility] OR;  Service: Orthopedics;  Laterality: Left;    HYSTERECTOMY      INSERTION OF PACEMAKER Left 2014    KNEE ARTHROSCOPY      left shoulder      SURGICAL REMOVAL OF BONE SPUR Left     Removed  "from leftr shoulder     Family History   Problem Relation Name Age of Onset    Diabetes Mother      No Known Problems Father      Heart disease Maternal Aunt      Diabetes Maternal Grandmother      Breast cancer Maternal Grandmother      Diabetes Maternal Grandfather         All of your core healthy metrics are met.      Review of patient's allergies indicates:   Allergen Reactions    Comtrex Other (See Comments)       Current Outpatient Medications:     ALPRAZolam (XANAX) 0.25 MG tablet, , Disp: , Rfl:     ascorbic acid, vitamin C, (VITAMIN C) 500 MG tablet, Take 500 mg by mouth every evening. , Disp: , Rfl:     KRILL OIL ORAL, Take 500 mg by mouth. Mon, Tues, Wed, Thurs, Fri, Sat, Disp: , Rfl:     meclizine (ANTIVERT) 25 mg tablet, Take 1 tablet (25 mg total) by mouth 3 (three) times daily as needed., Disp: 30 tablet, Rfl: 3    metoprolol succinate (TOPROL-XL) 25 MG 24 hr tablet, Take 25 mg by mouth once daily., Disp: , Rfl:     metoprolol succinate (TOPROL-XL) 50 MG 24 hr tablet, Take 50 mg by mouth., Disp: , Rfl:     multivitamin (THERAGRAN) per tablet, Take 0.5 capsules by mouth every evening. , Disp: , Rfl:     potassium gluconate 595 mg (99 mg) Tab, Take 1 tablet by mouth every evening., Disp: , Rfl:     vitamin D (VITAMIN D3) 1000 units Tab, Take 1,000 Units by mouth every evening. , Disp: , Rfl:     estradioL (ESTRACE) 2 MG tablet, Take 1 tablet (2 mg total) by mouth every evening., Disp: 90 tablet, Rfl: 1    thyroid, pork, (ARMOUR THYROID) 90 mg Tab, Take 1 tablet (90 mg total) by mouth before breakfast., Disp: 90 tablet, Rfl: 1    Objective:      Vitals:    11/11/24 0936   BP: 92/64   Pulse: 70   SpO2: 96%   Weight: 93.3 kg (205 lb 9.6 oz)   Height: 5' 9" (1.753 m)     Physical Exam    Vitals: Blood pressure: 92/64.  General: No acute distress. Well-developed. Well-nourished.  HENT: Normocephalic. Atraumatic. Nares patent. Moist oral mucosa. Mild swelling on the right side of face. Tenderness under the " right eye.  Ears: Bilateral TMs clear. Bilateral EACs clear.  Cardiovascular: Regular rate. Regular rhythm. No murmurs. No rubs. No gallops.   Respiratory: Normal respiratory effort. Clear to auscultation bilaterally. No rales. No rhonchi. No wheezing.  Abdomen: Soft. Non-tender. Non-distended. Normoactive bowel sounds.  Musculoskeletal: No  obvious deformity.  Extremities: No lower extremity edema.  Neurological: Alert & oriented x3. No slurred speech. Normal gait.  Psychiatric: Normal mood. Normal affect. Good insight. Good judgment.  Skin: Warm. Dry. No rash.       Assessment:       Assessment & Plan    - Assessed patient's blood pressure, noting it is on the low end of normal (92/64) due to metoprolol  - Evaluated need for current metoprolol dosage, considering its primary purpose for rate control rather than treating hypertension  - Considered genetic factors as potential cause for persistently elevated triglycerides, despite patient's reported healthy diet  - Reviewed patient's sinus-related symptoms, noting slight swelling on the right side    HYPERTENSION:  Explained that metoprolol lowers both heart rate and blood pressure.  Discussed importance of hydration, especially during active periods, to prevent further blood pressure drops.  Informed patient about the relationship between dehydration and blood pressure.  Mariah to stay hydrated, especially during active periods or hot weather.  Mariah to change positions slowly to avoid dizziness due to low blood pressure.  Continued metoprolol 75mg daily (50mg and 25mg tablets taken together).  Skip the 25mg dose on days of planned elevated activity or if feeling unwell, taking only the 50mg dose.    COLON CANCER SCREENING:  Educated on the frequency of colon cancer screening based on results (3 years for cancerous polyps, 5-7 years for non-cancerous polyps).  Ordered Cologuard test for colon cancer screening.  Contact office to schedule Cologuard test for late  January or early February.    HYPERLIPIDEMIA:  Mariah to fast before next lab work to accurately assess triglyceride levels.  Ordered follow-up labs to recheck cholesterol and triglycerides.    THYROID DISORDER:  Continued levothyroxine 90mcg daily.  Refilled levothyroxine, providing 90-day supply with refills.    MEDICATIONS/SUPPLEMENTS:  Continued estradiol.  Refilled estradiol, providing 90-day supply with refills.    LABS:  Complete full panel of labs before next appointment, ensuring to fast before the blood draw.    BREAST CANCER SCREENING:  Schedule mammogram after December 8th.    FOLLOW UP:  Follow up in 6 months (April).       Plan:       Encounter for screening for malignant neoplasm of breast, unspecified screening modality  -     Mammo Digital Screening Bilat w/ [facility]; Future; Expected date: 11/14/2024    Screening for colon cancer  -     Ambulatory referral/consult to Gastroenterology; Future; Expected date: 11/18/2024    Hypothyroidism, unspecified type  Comments:  increase armour thyroid to 90mg 5 days per week. repeat tsh in 6-8 weeks    Menopause  Comments:  estradiol    Pacemaker  Comments:  followed by dr. her    Anxiety  Comments:  xanax prn    High risk medication use      Follow up in about 6 months (around 5/11/2025), or if symptoms worsen or fail to improve, for medication management.        This note was generated with the assistance of ambient listening technology. Verbal consent was obtained by the patient and accompanying visitor(s) for the recording of patient appointment to facilitate this note. I attest to having reviewed and edited the generated note for accuracy, though some syntax or spelling errors may persist. Please contact the author of this note for any clarification.      11/14/2024 Julienne Damon

## 2024-11-14 NOTE — TELEPHONE ENCOUNTER
----- Message from Kala sent at 11/14/2024  9:24 AM CST -----  - 9:04- [facility] juventino is calling for a prior auth on her thryoid medication. She has to have 3 failed medications with clinicals. They have sent the electronic prior auth over multiple times   [phone]

## 2024-11-14 NOTE — TELEPHONE ENCOUNTER
PA is requiring justification for using [place] in office note . Please put in note and I will fax

## 2024-11-14 NOTE — TELEPHONE ENCOUNTER
----- Message from Kala sent at 11/14/2024  8:39 AM CST -----  Pt would like to talk to nurse or jayashree about the medication we ordered Monday. Her insurance is not covering it and she would like to possibly switch to something else or stop it all together.   [phone]

## 2024-11-15 NOTE — TELEPHONE ENCOUNTER
THYROID DISORDER:  Continued levothyroxine 90mcg daily.  Refilled levothyroxine, providing 90-day supply with refills.    Per your not and they need Justification for the [place] and she has tried levothyroxine and it made her feel terrible     Pt aware we are going to send the notes

## 2024-11-15 NOTE — TELEPHONE ENCOUNTER
----- Message from Kala sent at 11/15/2024  8:09 AM CST -----  Pt called yesterday about her medication that was sent on Monday and she never received a call back. She is not happy and would like an update as to what is going on   [phone]

## 2024-12-10 ENCOUNTER — TELEPHONE (OUTPATIENT)
Dept: FAMILY MEDICINE | Facility: CLINIC | Age: 59
End: 2024-12-10
Payer: COMMERCIAL

## 2024-12-10 ENCOUNTER — HOSPITAL ENCOUNTER (OUTPATIENT)
Dept: RADIOLOGY | Facility: HOSPITAL | Age: 59
Discharge: HOME | End: 2024-12-10
Attending: NURSE PRACTITIONER
Payer: COMMERCIAL

## 2024-12-10 VITALS — WEIGHT: 205 LBS | BODY MASS INDEX: 30.36 KG/M2 | HEIGHT: 69 IN

## 2024-12-10 DIAGNOSIS — Z12.39 ENCOUNTER FOR SCREENING FOR MALIGNANT NEOPLASM OF BREAST, UNSPECIFIED SCREENING MODALITY: ICD-10-CM

## 2024-12-10 PROCEDURE — 77067 SCR MAMMO BI INCL CAD: CPT | Mod: TC,PO

## 2024-12-10 PROCEDURE — 77067 SCR MAMMO BI INCL CAD: CPT | Mod: 26,,, | Performed by: RADIOLOGY

## 2024-12-10 PROCEDURE — 77063 BREAST TOMOSYNTHESIS BI: CPT | Mod: 26,,, | Performed by: RADIOLOGY

## 2025-04-28 ENCOUNTER — TELEPHONE (OUTPATIENT)
Dept: FAMILY MEDICINE | Facility: CLINIC | Age: 60
End: 2025-04-28
Payer: COMMERCIAL

## 2025-04-28 DIAGNOSIS — Z00.00 PHYSICAL EXAM: ICD-10-CM

## 2025-04-28 DIAGNOSIS — Z12.39 ENCOUNTER FOR SCREENING FOR MALIGNANT NEOPLASM OF BREAST, UNSPECIFIED SCREENING MODALITY: ICD-10-CM

## 2025-04-28 DIAGNOSIS — Z79.899 HIGH RISK MEDICATION USE: ICD-10-CM

## 2025-04-28 DIAGNOSIS — Z79.899 ENCOUNTER FOR LONG-TERM (CURRENT) USE OF MEDICATIONS: ICD-10-CM

## 2025-04-28 DIAGNOSIS — E03.9 HYPOTHYROIDISM, UNSPECIFIED TYPE: Primary | ICD-10-CM

## 2025-04-28 DIAGNOSIS — I10 HYPERTENSION, UNSPECIFIED TYPE: ICD-10-CM

## 2025-04-28 DIAGNOSIS — E78.5 HYPERLIPIDEMIA, UNSPECIFIED HYPERLIPIDEMIA TYPE: ICD-10-CM

## 2025-04-28 DIAGNOSIS — Z00.00 ROUTINE GENERAL MEDICAL EXAMINATION AT A HEALTH CARE FACILITY: ICD-10-CM

## 2025-05-12 ENCOUNTER — OFFICE VISIT (OUTPATIENT)
Dept: FAMILY MEDICINE | Facility: CLINIC | Age: 60
End: 2025-05-12
Payer: COMMERCIAL

## 2025-05-12 VITALS
OXYGEN SATURATION: 99 % | DIASTOLIC BLOOD PRESSURE: 88 MMHG | HEIGHT: 69 IN | HEART RATE: 66 BPM | SYSTOLIC BLOOD PRESSURE: 128 MMHG | BODY MASS INDEX: 28.61 KG/M2 | WEIGHT: 193.19 LBS

## 2025-05-12 DIAGNOSIS — Z12.11 SCREENING FOR COLON CANCER: Primary | ICD-10-CM

## 2025-05-12 DIAGNOSIS — E03.9 HYPOTHYROIDISM, UNSPECIFIED TYPE: ICD-10-CM

## 2025-05-12 DIAGNOSIS — Z78.0 MENOPAUSE: ICD-10-CM

## 2025-05-12 DIAGNOSIS — M79.10 MYALGIA: ICD-10-CM

## 2025-05-12 DIAGNOSIS — E78.5 HYPERLIPIDEMIA, UNSPECIFIED HYPERLIPIDEMIA TYPE: ICD-10-CM

## 2025-05-12 DIAGNOSIS — Z95.0 PACEMAKER: ICD-10-CM

## 2025-05-12 PROCEDURE — 99214 OFFICE O/P EST MOD 30 MIN: CPT | Mod: S$GLB,,, | Performed by: NURSE PRACTITIONER

## 2025-05-12 RX ORDER — ESTRADIOL 2 MG/1
2 TABLET ORAL NIGHTLY
Qty: 90 TABLET | Refills: 1 | Status: SHIPPED | OUTPATIENT
Start: 2025-05-12

## 2025-05-12 RX ORDER — THYROID 90 MG/1
90 TABLET ORAL
Qty: 90 TABLET | Refills: 1 | Status: SHIPPED | OUTPATIENT
Start: 2025-05-12

## 2025-05-14 ENCOUNTER — RESULTS FOLLOW-UP (OUTPATIENT)
Dept: FAMILY MEDICINE | Facility: CLINIC | Age: 60
End: 2025-05-14
Payer: COMMERCIAL

## 2025-05-15 NOTE — PROGRESS NOTES
SUBJECTIVE:    Patient ID: Mariah Ashton is a 59 y.o. female.    Chief Complaint: Follow-up (No bottles, brought list//Pt is here for a follow up//KE)      History of Present Illness    CHIEF COMPLAINT:  Mariah presents today for follow up    MEDICATIONS:  She has one week remaining of [place] Thyroid and Estradiol and requires refills. She continues Metoprolol prescribed by Dr. Hannah. She previously experienced being without medication for 9 days due to pharmacy shortage, which resulted in feeling unwell.    CARDIOVASCULAR:  She denies dizziness or palpitations.      ROS:  General: -fever, -chills, -fatigue, -weight gain, -weight loss  Eyes: -vision changes, -redness, -discharge  ENT: -ear pain, -nasal congestion, -sore throat  Cardiovascular: -chest pain, -palpitations, -lower extremity edema  Respiratory: -cough, -shortness of breath  Gastrointestinal: -abdominal pain, -nausea, -vomiting, -diarrhea, -constipation, -blood in stool  Genitourinary: -dysuria, -hematuria, -frequency  Musculoskeletal: -joint pain, -muscle pain  Skin: -rash, -lesion  Neurological: -headache, -dizziness, -numbness, -tingling  Psychiatric: -anxiety, -depression, -sleep difficulty              Telephone on 04/28/2025   Component Date Value Ref Range Status    WBC 05/12/2025 7.5  3.8 - 10.8 Thousand/uL Final    RBC 05/12/2025 4.76  3.80 - 5.10 Million/uL Final    Hemoglobin 05/12/2025 14.1  11.7 - 15.5 g/dL Final    Hematocrit 05/12/2025 43.2  35.0 - 45.0 % Final    MCV 05/12/2025 90.8  80.0 - 100.0 fL Final    MCH 05/12/2025 29.6  27.0 - 33.0 pg Final    MCHC 05/12/2025 32.6  32.0 - 36.0 g/dL Final    RDW 05/12/2025 13.3  11.0 - 15.0 % Final    Platelets 05/12/2025 307  140 - 400 Thousand/uL Final    MPV 05/12/2025 9.1  7.5 - 12.5 fL Final    Neutrophils, Abs 05/12/2025 4,425  1,500 - 7,800 cells/uL Final    Lymph # 05/12/2025 2,333  850 - 3,900 cells/uL Final    Mono # 05/12/2025 510  200 - 950 cells/uL Final    Eos # 05/12/2025 180  15  - 500 cells/uL Final    Baso # 05/12/2025 53  0 - 200 cells/uL Final    Neutrophils Relative 05/12/2025 59  % Final    Lymph % 05/12/2025 31.1  % Final    Mono % 05/12/2025 6.8  % Final    Eosinophil % 05/12/2025 2.4  % Final    Basophil % 05/12/2025 0.7  % Final    Glucose 05/12/2025 79  65 - 99 mg/dL Final    BUN 05/12/2025 15  7 - 25 mg/dL Final    Creatinine 05/12/2025 0.59  0.50 - 1.05 mg/dL Final    eGFR 05/12/2025 103  > OR = 60 mL/min/1.73m2 Final    BUN/Creatinine Ratio 05/12/2025 SEE NOTE:  6 - 22 (calc) Final    Sodium 05/12/2025 136  135 - 146 mmol/L Final    Potassium 05/12/2025 3.8  3.5 - 5.3 mmol/L Final    Chloride 05/12/2025 99  98 - 110 mmol/L Final    CO2 05/12/2025 30  20 - 32 mmol/L Final    Calcium 05/12/2025 9.2  8.6 - 10.4 mg/dL Final    Total Protein 05/12/2025 7.2  6.1 - 8.1 g/dL Final    Albumin 05/12/2025 4.3  3.6 - 5.1 g/dL Final    Globulin, Total 05/12/2025 2.9  1.9 - 3.7 g/dL (calc) Final    Albumin/Globulin Ratio 05/12/2025 1.5  1.0 - 2.5 (calc) Final    Total Bilirubin 05/12/2025 0.5  0.2 - 1.2 mg/dL Final    Alkaline Phosphatase 05/12/2025 81  37 - 153 U/L Final    AST 05/12/2025 17  10 - 35 U/L Final    ALT 05/12/2025 13  6 - 29 U/L Final    Cholesterol 05/12/2025 220 (H)  <200 mg/dL Final    HDL 05/12/2025 64  > OR = 50 mg/dL Final    Triglycerides 05/12/2025 335 (H)  <150 mg/dL Final    LDL Cholesterol 05/12/2025 111 (H)  mg/dL (calc) Final    HDL/Cholesterol Ratio 05/12/2025 3.4  <5.0 (calc) Final    Non HDL Chol. (LDL+VLDL) 05/12/2025 156 (H)  <130 mg/dL (calc) Final    Creatinine, Urine 05/12/2025 141  20 - 275 mg/dL Final    Microalb, Ur 05/12/2025 0.8  See Note: mg/dL Final    Microalb/Creat Ratio 05/12/2025 6  <30 mg/g creat Final    TSH w/reflex to FT4 05/12/2025 2.02  0.40 - 4.50 mIU/L Final    Color, UA 05/12/2025 YELLOW  YELLOW Final    Appearance, UA 05/12/2025 CLEAR  CLEAR Final    Specific Gravity, UA 05/12/2025 1.019  1.001 - 1.035 Final    pH, UA 05/12/2025 7.5   5.0 - 8.0 Final    Glucose, UA 05/12/2025 NEGATIVE  NEGATIVE Final    Bilirubin, UA 05/12/2025 NEGATIVE  NEGATIVE Final    Ketones, UA 05/12/2025 NEGATIVE  NEGATIVE Final    Occult Blood UA 05/12/2025 NEGATIVE  NEGATIVE Final    Protein, UA 05/12/2025 NEGATIVE  NEGATIVE Final    Nitrite, UA 05/12/2025 NEGATIVE  NEGATIVE Final    Leukocytes, UA 05/12/2025 TRACE (A)  NEGATIVE Final    WBC Casts, UA 05/12/2025 0-5  < OR = 5 /HPF Final    RBC Casts, UA 05/12/2025 3-10 (A)  < OR = 2 /HPF Final    Squam Epithel, UA 05/12/2025 0-5  < OR = 5 /HPF Final    Bacteria, UA 05/12/2025 FEW (A)  NONE SEEN /HPF Final    Hyaline Casts, UA 05/12/2025 NONE SEEN  NONE SEEN /LPF Final    Service Cmt: 05/12/2025 SEE COMMENT   Final    Reflexive Urine Culture 05/12/2025 SEE COMMENT   Final    Urine Culture, Routine 05/12/2025    Preliminary       Past Medical History:   Diagnosis Date    Bradycardia     Hypertension     Thyroid disease      Past Surgical History:   Procedure Laterality Date    ARTHROSCOPY OF KNEE Left 8/21/2019    Procedure: ARTHROSCOPY, KNEE;  Surgeon: Porter Cifuentes MD;  Location: [facility] OR;  Service: Orthopedics;  Laterality: Left;    ATRIAL CARDIAC PACEMAKER INSERTION      EXCISION OF MEDIAL MENISCUS OF KNEE Left 8/21/2019    Procedure: MENISCECTOMY, KNEE,  partial,MEDIAL,lateral;  Surgeon: Porter Cifuentes MD;  Location: [facility] OR;  Service: Orthopedics;  Laterality: Left;    HYSTERECTOMY  1999    INSERTION OF PACEMAKER Left 12/2014    KNEE ARTHROSCOPY      left shoulder      SURGICAL REMOVAL OF BONE SPUR Left 2016    Removed from leftr shoulder     Family History   Problem Relation Name Age of Onset    Diabetes Mother      No Known Problems Father      Heart disease Maternal Aunt      Diabetes Maternal Grandmother      Breast cancer Maternal Grandmother  57    Diabetes Maternal Grandfather         Tests to Keep You Healthy    Mammogram: Met on 12/10/2024  Colon Cancer Screening: ORDERED      The 10-year CVD risk score  "(Mahamed et al., 2008) is: 6.8%    Values used to calculate the score:      Age: 59 years      Sex: Female      Diabetic: No      Tobacco smoker: No      Systolic Blood Pressure: 128 mmHg      Is BP treated: No      HDL Cholesterol: 64 mg/dL      Total Cholesterol: 220 mg/dL     Marital Status:   Alcohol History:  reports current alcohol use.  Tobacco History:  reports that she quit smoking about 30 years ago. Her smoking use included cigarettes. She started smoking about 40 years ago. She has a 5 pack-year smoking history. She has been exposed to tobacco smoke. She has never used smokeless tobacco.  Drug History:  reports that she does not currently use drugs.    Health Maintenance Topics with due status: Not Due       Topic Last Completion Date    Mammogram 12/10/2024    Lipid Panel 05/12/2025       There is no immunization history on file for this patient.    Review of patient's allergies indicates:   Allergen Reactions    Comtrex Other (See Comments)     Current Medications[1]        Objective:      Vitals:    05/12/25 0852   BP: 128/88   Pulse: 66   SpO2: 99%   Weight: 87.6 kg (193 lb 3.2 oz)   Height: 5' 9" (1.753 m)       Physical Exam  Vitals and nursing note reviewed.   Constitutional:       General: She is not in acute distress.     Appearance: Normal appearance. She is well-developed.   HENT:      Head: Normocephalic.      Right Ear: External ear normal.      Left Ear: External ear normal.      Nose: Congestion present.   Eyes:      Conjunctiva/sclera: Conjunctivae normal.      Pupils: Pupils are equal, round, and reactive to light.   Neck:      Vascular: No JVD.   Cardiovascular:      Rate and Rhythm: Normal rate and regular rhythm.      Heart sounds: No murmur heard.  Pulmonary:      Effort: Pulmonary effort is normal.      Breath sounds: Normal breath sounds.   Abdominal:      General: Bowel sounds are normal.      Palpations: Abdomen is soft.   Musculoskeletal:         General: No " deformity. Normal range of motion.      Cervical back: Normal range of motion and neck supple.   Lymphadenopathy:      Cervical: No cervical adenopathy.   Skin:     General: Skin is warm and dry.      Findings: No rash.   Neurological:      Mental Status: She is alert and oriented to person, place, and time.      Gait: Gait normal.   Psychiatric:         Speech: Speech normal.         Behavior: Behavior normal.          Assessment:       1. Screening for colon cancer    2. Hypothyroidism, unspecified type    3. Menopause    4. Pacemaker    5. Hyperlipidemia, unspecified hyperlipidemia type    6. Myalgia           Assessment & Plan    - Reviewed current medications.  - Considered upcoming appointment with Dr. Silva for ongoing care coordination.  - Assessed need for Cologuard test or colonoscopy, recommending colonoscopy with Dr. Trena Golden's group for more efficient and personalized care.    HYPOTHYROIDISM:  - Continued [place] Thyroid with prescription refill.  - Ordered routine lab work.    HYPERTENSION:  - Noted that Metoprolol prescription is being managed by Dr. Lynn.    HORMONE REPLACEMENT THERAPY:  - Continued Estradiol with prescription refill.  - Ordered routine lab work.    PREVENTIVE CARE:  - Referred for colonoscopy with Dr. Albrecht at outpatient surgery center on [facility].    FOLLOW-UP:  - Contact the office if additional refills are needed.  - Will have  investigate billing issues with 's Medicare claim and follow up.        Plan:       1. Screening for colon cancer  -     Ambulatory referral/consult to Gastroenterology; Future; Expected date: 05/19/2025    2. Hypothyroidism, unspecified type  Comments:  increase armour thyroid to 90mg 5 days per week. repeat tsh in 6-8 weeks  Orders:  -     thyroid, pork, (ARMOUR THYROID) 90 mg Tab; Take 1 tablet (90 mg total) by mouth before breakfast.  Dispense: 90 tablet; Refill: 1    3. Menopause  Comments:  decrease dose of  estradiol. repeat labs in 3 months.   Orders:  -     estradioL (ESTRACE) 2 MG tablet; Take 1 tablet (2 mg total) by mouth every evening.  Dispense: 90 tablet; Refill: 1    4. Pacemaker  Comments:  followed by dr. her    5. Hyperlipidemia, unspecified hyperlipidemia type  Comments:  work on diet and exercise    6. Myalgia  Comments:  cant tolerate statin      Follow up in about 6 months (around 11/12/2025), or if symptoms worsen or fail to improve, for medication management.          Counseled on age and gender appropriate medical preventative services, including cancer screenings, immunizations, overall nutritional health, need for a consistent exercise regimen and an overall push towards maintaining a vigorous and active lifestyle.      This note was generated with the assistance of ambient listening technology. Verbal consent was obtained by the patient and accompanying visitor(s) for the recording of patient appointment to facilitate this note. I attest to having reviewed and edited the generated note for accuracy, though some syntax or spelling errors may persist. Please contact the author of this note for any clarification.       5/14/2025 Julienne Damon NP         [1]   Current Outpatient Medications:     ALPRAZolam (XANAX) 0.25 MG tablet, , Disp: , Rfl:     ascorbic acid, vitamin C, (VITAMIN C) 500 MG tablet, Take 500 mg by mouth every evening. , Disp: , Rfl:     KRILL OIL ORAL, Take 500 mg by mouth once daily., Disp: , Rfl:     meclizine (ANTIVERT) 25 mg tablet, Take 1 tablet (25 mg total) by mouth 3 (three) times daily as needed., Disp: 30 tablet, Rfl: 3    metoprolol succinate (TOPROL-XL) 25 MG 24 hr tablet, Take 25 mg by mouth once daily., Disp: , Rfl:     metoprolol succinate (TOPROL-XL) 50 MG 24 hr tablet, Take 50 mg by mouth., Disp: , Rfl:     multivitamin (THERAGRAN) per tablet, Take 0.5 capsules by mouth every evening. , Disp: , Rfl:     potassium gluconate 595 mg (99 mg) Tab, Take 1 tablet by  mouth every evening., Disp: , Rfl:     vitamin D (VITAMIN D3) 1000 units Tab, Take 1,000 Units by mouth every evening. , Disp: , Rfl:     estradioL (ESTRACE) 2 MG tablet, Take 1 tablet (2 mg total) by mouth every evening., Disp: 90 tablet, Rfl: 1    thyroid, pork, (ARMOUR THYROID) 90 mg Tab, Take 1 tablet (90 mg total) by mouth before breakfast., Disp: 90 tablet, Rfl: 1

## 2025-07-08 ENCOUNTER — TELEPHONE (OUTPATIENT)
Dept: FAMILY MEDICINE | Facility: CLINIC | Age: 60
End: 2025-07-08

## 2025-07-08 ENCOUNTER — HOSPITAL ENCOUNTER (OUTPATIENT)
Dept: RADIOLOGY | Facility: HOSPITAL | Age: 60
Discharge: HOME | End: 2025-07-08
Attending: NURSE PRACTITIONER
Payer: COMMERCIAL

## 2025-07-08 ENCOUNTER — OFFICE VISIT (OUTPATIENT)
Dept: FAMILY MEDICINE | Facility: CLINIC | Age: 60
End: 2025-07-08
Payer: COMMERCIAL

## 2025-07-08 VITALS
HEIGHT: 69 IN | SYSTOLIC BLOOD PRESSURE: 124 MMHG | HEART RATE: 59 BPM | BODY MASS INDEX: 28.58 KG/M2 | WEIGHT: 193 LBS | DIASTOLIC BLOOD PRESSURE: 70 MMHG | OXYGEN SATURATION: 99 %

## 2025-07-08 DIAGNOSIS — M25.562 ACUTE PAIN OF LEFT KNEE: Primary | ICD-10-CM

## 2025-07-08 DIAGNOSIS — M25.562 ACUTE PAIN OF LEFT KNEE: ICD-10-CM

## 2025-07-08 PROCEDURE — 73562 X-RAY EXAM OF KNEE 3: CPT | Mod: TC,PO,LT

## 2025-07-08 PROCEDURE — 99213 OFFICE O/P EST LOW 20 MIN: CPT | Mod: S$GLB,,, | Performed by: NURSE PRACTITIONER

## 2025-07-08 PROCEDURE — 73562 X-RAY EXAM OF KNEE 3: CPT | Mod: 26,LT,, | Performed by: RADIOLOGY

## 2025-07-08 RX ORDER — CELECOXIB 200 MG/1
200 CAPSULE ORAL 2 TIMES DAILY
Qty: 60 CAPSULE | Refills: 3 | Status: SHIPPED | OUTPATIENT
Start: 2025-07-08

## 2025-07-08 NOTE — LETTER
July 8, 2025      [facility] [address] [place] [address]  Phone: [phone]  Fax: [phone]       Patient: Mariah Ashton   YOB: 1965  Date of Visit: 07/08/2025    To Whom It May Concern:    JAIRO Ashton  was at [facility] on 07/08/2025. The patient may return to work/school on July 14, 2025 with no restrictions. If you have any questions or concerns, or if I can be of further assistance, please do not hesitate to contact me.    Sincerely,     Lexy Pedroza, NP

## 2025-07-08 NOTE — TELEPHONE ENCOUNTER
"Spoke with patient who states she feels like she did something to her knee yesterday, she moved a certain way and it "popped" she states she is having to use her husbands crutches because she can barely stand on it. Scheduled today   "

## 2025-07-08 NOTE — PROGRESS NOTES
"  SUBJECTIVE:    Patient ID: Mariah Ashton is a 60 y.o. female.    Chief Complaint: Knee Pain (No bottles//Pt c/o left knee pain, since yesterday. Pt stated that pain with putting weight on the knee.  Pt denies falls or injuries.)      History of Present Illness    Patient presents today with acute onset of left knee pain.    Pt reports she was at work yesterday sitting at her desk when she pushed herself back to stand up and had immediate pain to left knee with popping sensation. Localizes pain more to upper lateral left knee. Reports knee is not painful unless she tried to bear weight and then she has rather severe pain. Has been using crutches since yesterday. Has not taken anything for pain. Has minimal swelling to lateral knee today but pain has not improved.    Pt reports history of knee arthritis,"bone-on-bone initially diagnosed over 10 years ago by Dr. Cifuentes.  She has never had any intervention to the knee or injections and declines steroid injection.  She reports over the last few years if she does certain movements she will have knee pain though cause of her limp for a few days      ROS:  General: no fever, no chills, no fatigue, no weight gain, no weight loss  Cardiovascular: no chest pain, no palpitations, no lower extremity edema  Respiratory: no cough, no shortness of breath  Gastrointestinal: no abdominal pain, no nausea, no vomiting, no diarrhea, no constipation, no blood in stool  Genitourinary: no dysuria, no hematuria, no frequency  Musculoskeletal: +left knee joint pain with weight bearing, no muscle pain,  Skin: no rash, no lesion  Neurological: no headache, no dizziness, no numbness, no tingling               Telephone on 04/28/2025   Component Date Value Ref Range Status    WBC 05/12/2025 7.5  3.8 - 10.8 Thousand/uL Final    RBC 05/12/2025 4.76  3.80 - 5.10 Million/uL Final    Hemoglobin 05/12/2025 14.1  11.7 - 15.5 g/dL Final    Hematocrit 05/12/2025 43.2  35.0 - 45.0 % Final    MCV " 05/12/2025 90.8  80.0 - 100.0 fL Final    MCH 05/12/2025 29.6  27.0 - 33.0 pg Final    MCHC 05/12/2025 32.6  32.0 - 36.0 g/dL Final    RDW 05/12/2025 13.3  11.0 - 15.0 % Final    Platelets 05/12/2025 307  140 - 400 Thousand/uL Final    MPV 05/12/2025 9.1  7.5 - 12.5 fL Final    Neutrophils, Abs 05/12/2025 4,425  1,500 - 7,800 cells/uL Final    Lymph # 05/12/2025 2,333  850 - 3,900 cells/uL Final    Mono # 05/12/2025 510  200 - 950 cells/uL Final    Eos # 05/12/2025 180  15 - 500 cells/uL Final    Baso # 05/12/2025 53  0 - 200 cells/uL Final    Neutrophils Relative 05/12/2025 59  % Final    Lymph % 05/12/2025 31.1  % Final    Mono % 05/12/2025 6.8  % Final    Eosinophil % 05/12/2025 2.4  % Final    Basophil % 05/12/2025 0.7  % Final    Glucose 05/12/2025 79  65 - 99 mg/dL Final    BUN 05/12/2025 15  7 - 25 mg/dL Final    Creatinine 05/12/2025 0.59  0.50 - 1.05 mg/dL Final    eGFR 05/12/2025 103  > OR = 60 mL/min/1.73m2 Final    BUN/Creatinine Ratio 05/12/2025 SEE NOTE:  6 - 22 (calc) Final    Sodium 05/12/2025 136  135 - 146 mmol/L Final    Potassium 05/12/2025 3.8  3.5 - 5.3 mmol/L Final    Chloride 05/12/2025 99  98 - 110 mmol/L Final    CO2 05/12/2025 30  20 - 32 mmol/L Final    Calcium 05/12/2025 9.2  8.6 - 10.4 mg/dL Final    Total Protein 05/12/2025 7.2  6.1 - 8.1 g/dL Final    Albumin 05/12/2025 4.3  3.6 - 5.1 g/dL Final    Globulin, Total 05/12/2025 2.9  1.9 - 3.7 g/dL (calc) Final    Albumin/Globulin Ratio 05/12/2025 1.5  1.0 - 2.5 (calc) Final    Total Bilirubin 05/12/2025 0.5  0.2 - 1.2 mg/dL Final    Alkaline Phosphatase 05/12/2025 81  37 - 153 U/L Final    AST 05/12/2025 17  10 - 35 U/L Final    ALT 05/12/2025 13  6 - 29 U/L Final    Cholesterol 05/12/2025 220 (H)  <200 mg/dL Final    HDL 05/12/2025 64  > OR = 50 mg/dL Final    Triglycerides 05/12/2025 335 (H)  <150 mg/dL Final    LDL Cholesterol 05/12/2025 111 (H)  mg/dL (calc) Final    HDL/Cholesterol Ratio 05/12/2025 3.4  <5.0 (calc) Final    Non HDL  Chol. (LDL+VLDL) 05/12/2025 156 (H)  <130 mg/dL (calc) Final    Creatinine, Urine 05/12/2025 141  20 - 275 mg/dL Final    Microalb, Ur 05/12/2025 0.8  See Note: mg/dL Final    Microalb/Creat Ratio 05/12/2025 6  <30 mg/g creat Final    TSH w/reflex to FT4 05/12/2025 2.02  0.40 - 4.50 mIU/L Final    Color, UA 05/12/2025 YELLOW  YELLOW Final    Appearance, UA 05/12/2025 CLEAR  CLEAR Final    Specific Gravity, UA 05/12/2025 1.019  1.001 - 1.035 Final    pH, UA 05/12/2025 7.5  5.0 - 8.0 Final    Glucose, UA 05/12/2025 NEGATIVE  NEGATIVE Final    Bilirubin, UA 05/12/2025 NEGATIVE  NEGATIVE Final    Ketones, UA 05/12/2025 NEGATIVE  NEGATIVE Final    Occult Blood UA 05/12/2025 NEGATIVE  NEGATIVE Final    Protein, UA 05/12/2025 NEGATIVE  NEGATIVE Final    Nitrite, UA 05/12/2025 NEGATIVE  NEGATIVE Final    Leukocytes, UA 05/12/2025 TRACE (A)  NEGATIVE Final    WBC Casts, UA 05/12/2025 0-5  < OR = 5 /HPF Final    RBC Casts, UA 05/12/2025 3-10 (A)  < OR = 2 /HPF Final    Squam Epithel, UA 05/12/2025 0-5  < OR = 5 /HPF Final    Bacteria, UA 05/12/2025 FEW (A)  NONE SEEN /HPF Final    Hyaline Casts, UA 05/12/2025 NONE SEEN  NONE SEEN /LPF Final    Service Cmt: 05/12/2025 SEE COMMENT   Final    Reflexive Urine Culture 05/12/2025 SEE COMMENT   Final    Urine Culture, Routine 05/12/2025 SEE COMMENT   Final       Past Medical History:   Diagnosis Date    Bradycardia     Hypertension     Thyroid disease      Past Surgical History:   Procedure Laterality Date    ARTHROSCOPY OF KNEE Left 8/21/2019    Procedure: ARTHROSCOPY, KNEE;  Surgeon: Porter Cifuentes MD;  Location: [facility];  Service: Orthopedics;  Laterality: Left;    ATRIAL CARDIAC PACEMAKER INSERTION      EXCISION OF MEDIAL MENISCUS OF KNEE Left 8/21/2019    Procedure: MENISCECTOMY, KNEE,  partial,MEDIAL,lateral;  Surgeon: Porter Cifuentes MD;  Location: [facility];  Service: Orthopedics;  Laterality: Left;    HYSTERECTOMY  1999    INSERTION OF PACEMAKER Left 12/2014    KNEE ARTHROSCOPY   "    left shoulder      SURGICAL REMOVAL OF BONE SPUR Left 2016    Removed from leftr shoulder     Family History   Problem Relation Name Age of Onset    Diabetes Mother      No Known Problems Father      Heart disease Maternal Aunt      Diabetes Maternal Grandmother      Breast cancer Maternal Grandmother  57    Diabetes Maternal Grandfather         Tests to Keep You Healthy    Mammogram: Met on 12/10/2024  Colon Cancer Screening: ORDERED      The 10-year CVD risk score (Mahamed, et al., 2008) is: 6.5%    Values used to calculate the score:      Age: 60 years      Sex: Female      Diabetic: No      Tobacco smoker: No      Systolic Blood Pressure: 124 mmHg      Is BP treated: No      HDL Cholesterol: 64 mg/dL      Total Cholesterol: 220 mg/dL     Marital Status:   Alcohol History:  reports current alcohol use.  Tobacco History:  reports that she quit smoking about 30 years ago. Her smoking use included cigarettes. She started smoking about 40 years ago. She has a 5 pack-year smoking history. She has been exposed to tobacco smoke. She has never used smokeless tobacco.  Drug History:  reports that she does not currently use drugs.    Health Maintenance Topics with due status: Not Due       Topic Last Completion Date    Influenza Vaccine 11/11/2024    Mammogram 12/10/2024    Lipid Panel 05/12/2025       There is no immunization history on file for this patient.    Review of patient's allergies indicates:   Allergen Reactions    Comtrex Other (See Comments)     Current Medications[1]        Objective:      Vitals:    07/08/25 1154   BP: 124/70   Pulse: (!) 59   SpO2: 99%   Weight: 87.5 kg (193 lb)   Height: 5' 9" (1.753 m)       Physical Exam  Constitutional:       General: She is not in acute distress.     Appearance: Normal appearance. She is not toxic-appearing.   HENT:      Head: Normocephalic and atraumatic.   Cardiovascular:      Rate and Rhythm: Normal rate and regular rhythm.      Heart sounds: No " murmur heard.  Pulmonary:      Effort: Pulmonary effort is normal. No respiratory distress.      Breath sounds: Normal breath sounds.   Musculoskeletal:      Left knee: Effusion (mild latera/suprapatella effusion) present. No deformity, erythema, ecchymosis or bony tenderness. Normal range of motion. No tenderness. Normal meniscus and normal patellar mobility.      Right lower leg: No edema.      Left lower leg: No edema.   Neurological:      General: No focal deficit present.      Mental Status: She is alert and oriented to person, place, and time.      Gait: Gait abnormal (ambulating with crutches and toe touching with left leg).          Assessment:       1. Acute pain of left knee           Assessment & Plan    ACUTE PAIN OF LEFT KNEE:  - Evaluated the knee pain and suspect meniscus injury or significant arthritis based on bone-on-bone condition and sudden onset of pain with minimal trauma.  -no significant effusion or instability on exam  - Ordered XR Knee as initial diagnostic step  -discussed ortho referral and possible joint injection- she would like to hold off on ortho referral unless pain is not improving  -recommend starting celebrex as anti-inflammatory, elevate and ice knee and weight bear as tolerated. If no improvement call the office and will refer to Dr. Cifuentes      FOLLOW-UP:    - Instructed the patient to report if symptoms worsen.        Plan:       1. Acute pain of left knee  -     celecoxib (CELEBREX) 200 MG capsule; Take 1 capsule (200 mg total) by mouth 2 (two) times daily. Take with food  Dispense: 60 capsule; Refill: 3  -     X-Ray Knee 3 View Left; Future; Expected date: 07/08/2025      Follow up if symptoms worsen or fail to improve.        This note was generated with the assistance of ambient listening technology. Verbal consent was obtained by the patient and accompanying visitor(s) for the recording of patient appointment to facilitate this note. I attest to having reviewed and  edited the generated note for accuracy, though some syntax or spelling errors may persist. Please contact the author of this note for any clarification.       7/8/2025 Lexy Pedroza NP           [1]   Current Outpatient Medications:     ALPRAZolam (XANAX) 0.25 MG tablet, , Disp: , Rfl:     ascorbic acid, vitamin C, (VITAMIN C) 500 MG tablet, Take 500 mg by mouth every evening. , Disp: , Rfl:     celecoxib (CELEBREX) 200 MG capsule, Take 1 capsule (200 mg total) by mouth 2 (two) times daily. Take with food, Disp: 60 capsule, Rfl: 3    estradioL (ESTRACE) 2 MG tablet, Take 1 tablet (2 mg total) by mouth every evening., Disp: 90 tablet, Rfl: 1    KRILL OIL ORAL, Take 500 mg by mouth once daily., Disp: , Rfl:     meclizine (ANTIVERT) 25 mg tablet, Take 1 tablet (25 mg total) by mouth 3 (three) times daily as needed., Disp: 30 tablet, Rfl: 3    metoprolol succinate (TOPROL-XL) 25 MG 24 hr tablet, Take 25 mg by mouth once daily., Disp: , Rfl:     metoprolol succinate (TOPROL-XL) 50 MG 24 hr tablet, Take 50 mg by mouth., Disp: , Rfl:     multivitamin (THERAGRAN) per tablet, Take 0.5 capsules by mouth every evening. , Disp: , Rfl:     potassium gluconate 595 mg (99 mg) Tab, Take 1 tablet by mouth every evening., Disp: , Rfl:     thyroid, pork, (ARMOUR THYROID) 90 mg Tab, Take 1 tablet (90 mg total) by mouth before breakfast., Disp: 90 tablet, Rfl: 1    vitamin D (VITAMIN D3) 1000 units Tab, Take 1,000 Units by mouth every evening. , Disp: , Rfl:

## 2025-07-08 NOTE — TELEPHONE ENCOUNTER
----- Message from Harmony sent at 7/8/2025  9:09 AM CDT -----  Pt would like a call back about her knee bothering her.    [phone]

## 2025-07-08 NOTE — PATIENT INSTRUCTIONS
Go have knee xray taken today at [facility]    Start celebrex 200mg one tablet twice a day with food for at least 3-5 days

## 2025-08-08 LAB — CRC RECOMMENDATION EXT: NORMAL

## 2025-08-22 ENCOUNTER — PATIENT OUTREACH (OUTPATIENT)
Dept: ADMINISTRATIVE | Facility: HOSPITAL | Age: 60
End: 2025-08-22
Payer: COMMERCIAL

## (undated) DEVICE — TUBING CYSTO DOUBLE 654301

## (undated) DEVICE — SOLUTION NACL 0.9% 3000ML

## (undated) DEVICE — DRESSING ADAPTIC 3X8 2015

## (undated) DEVICE — GLOVE BIOGEL PI GOLD SZ  8.5

## (undated) DEVICE — CUFF TOURNIQUET 34DUAL PRT 5921-034-235

## (undated) DEVICE — GOWN SMART LRG 044673

## (undated) DEVICE — BANDAGE ACE STERILE 4 REB3114

## (undated) DEVICE — SOLUTION IRRI NS BOTTLE 1000ML R5200-01

## (undated) DEVICE — PREP CHLORA 26ML

## (undated) DEVICE — PAD BOVIE ADULT

## (undated) DEVICE — COVER LIGHT HANDLE LB53

## (undated) DEVICE — PACK ARTHROSCOPY SMHS009-07

## (undated) DEVICE — BLADE AGRESSIVE PLUS 4.0 375-544-000

## (undated) DEVICE — SPONGE GAUZE 10S 4X4  442214

## (undated) DEVICE — Device

## (undated) DEVICE — UNDERGLOVE BIOGEL MICRO BLUE SZ 8.5